# Patient Record
Sex: MALE | Race: WHITE | NOT HISPANIC OR LATINO | Employment: OTHER | URBAN - METROPOLITAN AREA
[De-identification: names, ages, dates, MRNs, and addresses within clinical notes are randomized per-mention and may not be internally consistent; named-entity substitution may affect disease eponyms.]

---

## 2020-12-16 ENCOUNTER — TELEMEDICINE (OUTPATIENT)
Dept: FAMILY MEDICINE CLINIC | Facility: CLINIC | Age: 75
End: 2020-12-16

## 2020-12-16 DIAGNOSIS — I25.708 CORONARY ARTERY DISEASE OF BYPASS GRAFT OF NATIVE HEART WITH STABLE ANGINA PECTORIS (HCC): ICD-10-CM

## 2020-12-16 DIAGNOSIS — E11.9 TYPE 2 DIABETES MELLITUS WITHOUT COMPLICATION, WITHOUT LONG-TERM CURRENT USE OF INSULIN (HCC): Primary | ICD-10-CM

## 2020-12-16 DIAGNOSIS — E78.2 MIXED HYPERLIPIDEMIA: ICD-10-CM

## 2020-12-16 DIAGNOSIS — Z23 NEED FOR VACCINATION: ICD-10-CM

## 2020-12-16 DIAGNOSIS — I10 ESSENTIAL HYPERTENSION: ICD-10-CM

## 2020-12-16 PROBLEM — Z95.1 S/P CABG X 3: Status: ACTIVE | Noted: 2020-12-16

## 2020-12-16 PROCEDURE — 99204 OFFICE O/P NEW MOD 45 MIN: CPT | Performed by: FAMILY MEDICINE

## 2020-12-16 RX ORDER — TRANDOLAPRIL TABLETS 4 MG/1
4 TABLET ORAL 2 TIMES DAILY
Qty: 180 TABLET | Refills: 1 | Status: SHIPPED | OUTPATIENT
Start: 2020-12-16 | End: 2021-06-07

## 2020-12-16 RX ORDER — INFLUENZA A VIRUS A/MICHIGAN/45/2015 X-275 (H1N1) ANTIGEN (FORMALDEHYDE INACTIVATED), INFLUENZA A VIRUS A/SINGAPORE/INFIMH-16-0019/2016 IVR-186 (H3N2) ANTIGEN (FORMALDEHYDE INACTIVATED), INFLUENZA B VIRUS B/PHUKET/3073/2013 ANTIGEN (FORMALDEHYDE INACTIVATED), AND INFLUENZA B VIRUS B/MARYLAND/15/2016 BX-69A ANTIGEN (FORMALDEHYDE INACTIVATED) 60; 60; 60; 60 UG/.7ML; UG/.7ML; UG/.7ML; UG/.7ML
1 INJECTION, SUSPENSION INTRAMUSCULAR ONCE
COMMUNITY
Start: 2020-12-01 | End: 2022-02-28

## 2020-12-16 RX ORDER — ROSUVASTATIN CALCIUM 20 MG/1
20 TABLET, COATED ORAL DAILY
Qty: 90 TABLET | Refills: 1 | Status: SHIPPED | OUTPATIENT
Start: 2020-12-16 | End: 2021-06-07

## 2020-12-16 RX ORDER — ZOSTER VACCINE RECOMBINANT, ADJUVANTED 50 MCG/0.5
0.5 KIT INTRAMUSCULAR ONCE
Qty: 0.5 ML | Refills: 0 | Status: SHIPPED | OUTPATIENT
Start: 2020-12-16 | End: 2020-12-16

## 2020-12-16 RX ORDER — METFORMIN HYDROCHLORIDE 500 MG/1
500 TABLET, EXTENDED RELEASE ORAL 2 TIMES DAILY WITH MEALS
Qty: 180 TABLET | Refills: 1 | Status: SHIPPED | OUTPATIENT
Start: 2020-12-16 | End: 2021-06-07

## 2020-12-16 NOTE — PROGRESS NOTES
VIDEO VISIT  Kerrie Tyler is 75 y.o. and is seen via video today for:     Chief Complaint   Patient presents with   • Diabetes   • Hypertension       HPI     Wanting to establish care today. Recently moved to [place] from [place] to live with his youngest son. He and his wife are from [place] originally but have been living in around the [place] for some time now.    Has established diagnoses of well-controlled type 2 diabetes, mixed hyperlipidemia, essential hypertension, and coronary artery disease status post a three-vessel CABG years ago. Overall is doing well. Reports good adherence and tolerance of his current medication regimen. Needs refills today of Metformin, metoprolol, rosuvastatin, and trandolapril.    Would also like to get a zoster vaccine in the near future. Wondering how best to go about doing this.    Is a retired ENT surgeon. Working hard to eat a balanced diet and exercise regularly. Enjoys taking care of his grandchild with his wife.    The following portions of the patient's history were reviewed and updated as appropriate: allergies, current medications, past family history, past medical history, past social history, past surgical history and problem list.    Review of Systems   Constitutional: Negative for activity change, chills, fatigue, fever and unexpected weight change.   HENT: Negative for sore throat.    Respiratory: Negative for cough, shortness of breath and wheezing.    Cardiovascular: Negative for chest pain, palpitations and leg swelling.   Gastrointestinal: Negative for abdominal distention, abdominal pain, constipation, diarrhea, nausea and vomiting.   Genitourinary: Negative for dysuria.   Musculoskeletal: Negative for arthralgias and myalgias.   Skin: Negative for rash.   Neurological: Negative for dizziness.   Psychiatric/Behavioral: Negative for dysphoric mood. The patient is not nervous/anxious.          Objective  There were no vitals filed for this visit.  There is no  height or weight on file to calculate BMI.    Physical Exam  Constitutional:       General: He is not in acute distress.     Appearance: Normal appearance. He is not ill-appearing.   HENT:      Head: Normocephalic.      Nose: Nose normal.   Eyes:      Extraocular Movements: Extraocular movements intact.      Conjunctiva/sclera: Conjunctivae normal.   Neck:      Musculoskeletal: Normal range of motion and neck supple.   Pulmonary:      Effort: Pulmonary effort is normal. No respiratory distress.   Skin:     Coloration: Skin is not jaundiced or pale.   Neurological:      Mental Status: He is alert and oriented to person, place, and time.   Psychiatric:         Mood and Affect: Mood normal.         Behavior: Behavior normal.           Current Outpatient Medications:   •  Fluzone High-Dose Quadrivalent 0.7 ML suspension prefilled syringe, Inject 1 dose into the appropriate muscle as directed by prescriber 1 (One) Time., Disp: , Rfl:   •  metFORMIN ER (GLUCOPHAGE-XR) 500 MG 24 hr tablet, Take 1 tablet by mouth 2 (Two) Times a Day With Meals for 180 days., Disp: 180 tablet, Rfl: 1  •  metoprolol tartrate (LOPRESSOR) 25 MG tablet, Take 1 tablet by mouth 2 (Two) Times a Day., Disp: 180 tablet, Rfl: 1  •  rosuvastatin (Crestor) 20 MG tablet, Take 1 tablet by mouth Daily., Disp: 90 tablet, Rfl: 1  •  trandolapril (MAVIK) 4 MG tablet, Take 1 tablet by mouth 2 (Two) Times a Day., Disp: 180 tablet, Rfl: 1  •  Zoster Vac Recomb Adjuvanted (Shingrix) 50 MCG/0.5ML reconstituted suspension, Inject 0.5 mL into the appropriate muscle as directed by prescriber 1 (One) Time for 1 dose., Disp: 0.5 mL, Rfl: 0  Current outpatient and discharge medications have been reconciled for the patient.  Reviewed by: Mayito Sepulveda MD      Procedures    Lab Results (most recent)     None                  Diagnoses and all orders for this visit:    1. Type 2 diabetes mellitus without complication, without long-term current use of insulin  (CMS/[facility]) (Primary)  -     metFORMIN ER (GLUCOPHAGE-XR) 500 MG 24 hr tablet; Take 1 tablet by mouth 2 (Two) Times a Day With Meals for 180 days.  Dispense: 180 tablet; Refill: 1    2. Mixed hyperlipidemia  -     rosuvastatin (Crestor) 20 MG tablet; Take 1 tablet by mouth Daily.  Dispense: 90 tablet; Refill: 1    3. Essential hypertension  -     metoprolol tartrate (LOPRESSOR) 25 MG tablet; Take 1 tablet by mouth 2 (Two) Times a Day.  Dispense: 180 tablet; Refill: 1  -     trandolapril (MAVIK) 4 MG tablet; Take 1 tablet by mouth 2 (Two) Times a Day.  Dispense: 180 tablet; Refill: 1    4. Coronary artery disease of bypass graft of native heart with stable angina pectoris (CMS/[facility])  -     metoprolol tartrate (LOPRESSOR) 25 MG tablet; Take 1 tablet by mouth 2 (Two) Times a Day.  Dispense: 180 tablet; Refill: 1    5. Need for vaccination  -     Zoster Vac Recomb Adjuvanted (Shingrix) 50 MCG/0.5ML reconstituted suspension; Inject 0.5 mL into the appropriate muscle as directed by prescriber 1 (One) Time for 1 dose.  Dispense: 0.5 mL; Refill: 0    Orders as above. Will send prescription for zoster vaccine to his pharmacy. Continue regimen as prescribed. Recommended follow-up as below for an in person visit. Encouraged communication via [employer] in the meantime.    Any information loaded into the AVS was placed there by NYA Sepulveda MD, and patient was counseled on the same.   Return in about 6 months (around 6/16/2021), or if symptoms worsen or fail to improve.      NYA Sepulveda MD

## 2021-01-25 ENCOUNTER — IMMUNIZATION (OUTPATIENT)
Dept: VACCINE CLINIC | Facility: HOSPITAL | Age: 76
End: 2021-01-25

## 2021-01-25 PROCEDURE — 0001A: CPT | Performed by: THORACIC SURGERY (CARDIOTHORACIC VASCULAR SURGERY)

## 2021-01-25 PROCEDURE — 91300 HC SARSCOV02 VAC 30MCG/0.3ML IM: CPT | Performed by: THORACIC SURGERY (CARDIOTHORACIC VASCULAR SURGERY)

## 2021-02-15 ENCOUNTER — IMMUNIZATION (OUTPATIENT)
Dept: VACCINE CLINIC | Facility: HOSPITAL | Age: 76
End: 2021-02-15

## 2021-02-15 PROCEDURE — 91300 HC SARSCOV02 VAC 30MCG/0.3ML IM: CPT | Performed by: THORACIC SURGERY (CARDIOTHORACIC VASCULAR SURGERY)

## 2021-02-15 PROCEDURE — 0002A: CPT | Performed by: THORACIC SURGERY (CARDIOTHORACIC VASCULAR SURGERY)

## 2021-06-05 DIAGNOSIS — E78.2 MIXED HYPERLIPIDEMIA: ICD-10-CM

## 2021-06-05 DIAGNOSIS — I10 ESSENTIAL HYPERTENSION: ICD-10-CM

## 2021-06-05 DIAGNOSIS — E11.9 TYPE 2 DIABETES MELLITUS WITHOUT COMPLICATION, WITHOUT LONG-TERM CURRENT USE OF INSULIN (HCC): ICD-10-CM

## 2021-06-05 DIAGNOSIS — I25.708 CORONARY ARTERY DISEASE OF BYPASS GRAFT OF NATIVE HEART WITH STABLE ANGINA PECTORIS (HCC): ICD-10-CM

## 2021-06-07 RX ORDER — TRANDOLAPRIL TABLETS 4 MG/1
TABLET ORAL
Qty: 180 TABLET | Refills: 1 | Status: SHIPPED | OUTPATIENT
Start: 2021-06-07 | End: 2021-11-30

## 2021-06-07 RX ORDER — METFORMIN HYDROCHLORIDE 500 MG/1
TABLET, EXTENDED RELEASE ORAL
Qty: 180 TABLET | Refills: 1 | Status: SHIPPED | OUTPATIENT
Start: 2021-06-07 | End: 2021-11-30

## 2021-06-07 RX ORDER — ROSUVASTATIN CALCIUM 20 MG/1
20 TABLET, COATED ORAL DAILY
Qty: 90 TABLET | Refills: 1 | Status: SHIPPED | OUTPATIENT
Start: 2021-06-07 | End: 2021-11-30

## 2021-09-14 ENCOUNTER — OFFICE VISIT (OUTPATIENT)
Dept: FAMILY MEDICINE CLINIC | Facility: CLINIC | Age: 76
End: 2021-09-14

## 2021-09-14 VITALS
DIASTOLIC BLOOD PRESSURE: 68 MMHG | OXYGEN SATURATION: 99 % | HEART RATE: 79 BPM | WEIGHT: 161 LBS | SYSTOLIC BLOOD PRESSURE: 130 MMHG | RESPIRATION RATE: 16 BRPM

## 2021-09-14 DIAGNOSIS — R39.11 BENIGN PROSTATIC HYPERPLASIA WITH URINARY HESITANCY: ICD-10-CM

## 2021-09-14 DIAGNOSIS — E11.9 TYPE 2 DIABETES MELLITUS WITHOUT COMPLICATION, WITHOUT LONG-TERM CURRENT USE OF INSULIN (HCC): ICD-10-CM

## 2021-09-14 DIAGNOSIS — E55.9 VITAMIN D DEFICIENCY: ICD-10-CM

## 2021-09-14 DIAGNOSIS — Z00.00 ROUTINE HEALTH MAINTENANCE: Primary | ICD-10-CM

## 2021-09-14 DIAGNOSIS — E11.319 DIABETIC RETINOPATHY OF LEFT EYE WITHOUT MACULAR EDEMA ASSOCIATED WITH TYPE 2 DIABETES MELLITUS, UNSPECIFIED RETINOPATHY SEVERITY (HCC): ICD-10-CM

## 2021-09-14 DIAGNOSIS — E78.2 MIXED HYPERLIPIDEMIA: ICD-10-CM

## 2021-09-14 DIAGNOSIS — L21.9 SEBORRHEIC DERMATITIS: ICD-10-CM

## 2021-09-14 DIAGNOSIS — H91.13 PRESBYCUSIS OF BOTH EARS: ICD-10-CM

## 2021-09-14 DIAGNOSIS — Z23 NEED FOR VACCINATION: ICD-10-CM

## 2021-09-14 DIAGNOSIS — Z11.59 ENCOUNTER FOR HEPATITIS C SCREENING TEST FOR LOW RISK PATIENT: ICD-10-CM

## 2021-09-14 DIAGNOSIS — N40.1 BENIGN PROSTATIC HYPERPLASIA WITH URINARY HESITANCY: ICD-10-CM

## 2021-09-14 DIAGNOSIS — I10 ESSENTIAL HYPERTENSION: ICD-10-CM

## 2021-09-14 PROCEDURE — G0439 PPPS, SUBSEQ VISIT: HCPCS | Performed by: FAMILY MEDICINE

## 2021-09-14 PROCEDURE — 99214 OFFICE O/P EST MOD 30 MIN: CPT | Performed by: FAMILY MEDICINE

## 2021-09-14 RX ORDER — MULTIVIT-MIN/IRON/FOLIC ACID/K 18-600-40
2000 CAPSULE ORAL DAILY
Qty: 90 CAPSULE | Refills: 3 | Status: SHIPPED | OUTPATIENT
Start: 2021-09-14

## 2021-09-14 RX ORDER — LANCETS
1 EACH MISCELLANEOUS DAILY
Qty: 90 EACH | Refills: 3 | Status: SHIPPED | OUTPATIENT
Start: 2021-09-14 | End: 2022-09-09

## 2021-09-14 RX ORDER — BLOOD-GLUCOSE METER
1 KIT MISCELLANEOUS DAILY
Qty: 1 EACH | Refills: 1 | Status: SHIPPED | OUTPATIENT
Start: 2021-09-14 | End: 2022-09-21 | Stop reason: SDUPTHER

## 2021-09-14 RX ORDER — TAMSULOSIN HYDROCHLORIDE 0.4 MG/1
1 CAPSULE ORAL DAILY
Qty: 90 CAPSULE | Refills: 1 | Status: SHIPPED | OUTPATIENT
Start: 2021-09-14 | End: 2022-02-28 | Stop reason: SDUPTHER

## 2021-09-14 RX ORDER — CHLORAL HYDRATE 500 MG
1000 CAPSULE ORAL
Qty: 90 CAPSULE | Refills: 3 | Status: SHIPPED | OUTPATIENT
Start: 2021-09-14

## 2021-09-14 NOTE — PROGRESS NOTES
Chief Complaint  Annual Exam    Subjective    History of Present Illness {CC  Problem List  Visit  Diagnosis   Encounters  Notes  Medications  Labs  Result Review Imaging  Media :23}     Kerrie Tyler presents to [facility] for Annual Exam.  History of Present Illness         Objective     Vital Signs:   /68   Pulse 79   Resp 16   Wt 73 kg (161 lb)   SpO2 99%   Physical Exam     Result Review  Data Reviewed:{ Labs  Result Review  Imaging  Med Tab  Media :23}   {The following data was reviewed by (Optional):50119}  {AMBULATORY LABS (Optional):84328}  {Data reviewed (Optional):00783}            Assessment and Plan {CC Problem List  Visit Diagnosis  ROS  Review (Popup)  Health Maintenance  Quality  BestPractice  Medications  SmartSets  SnapShot Encounters  Media :23}   There are no diagnoses linked to this encounter.    {Time Spent (Optional):62580}  Follow Up {Instructions Charge Capture  Follow-up Communications :23}     Patient was given instructions and counseling regarding his condition or for health maintenance advice. Please see specific information pulled into the AVS (placed there by myself) if appropriate.    No follow-ups on file.      NYA Sepulveda MD

## 2021-09-14 NOTE — PROGRESS NOTES
The ABCs of the Annual Wellness Visit  Subsequent Medicare Wellness Visit    Chief Complaint   Patient presents with   • Annual Exam      Subjective    History of Present Illness:  Kerrie Tyler is a 76 y.o. male who presents for a Subsequent Medicare Wellness Visit.    Here today for annual wellness visit as well as follow-up on a number of issues. Has not been seen for about a year. Doing well overall though requesting some refills and guidance.    Has chronic diabetes for which she is on Metformin. Is due for an annual eye check. Would like a referral to an ophthalmologist. Also needs new prescriptions for glucometer and supplies. Due for check of labs today as well.    Has worsening symptoms of BPH. Has urinary frequency, hesitancy, and weak stream. Tried alfuzosin in the past but did not tolerate it. Has never tried tamsulosin. Not interested in seeing urology at this time.    Hoping for refills of vitamin D supplementation and fish oil. Reports good adherence and tolerance overall.    Lastly is requesting a referral to ENT for evaluation of slow and progressive hearing loss.    The following portions of the patient's history were reviewed and   updated as appropriate: allergies, current medications, past family history, past medical history, past social history, past surgical history and problem list.    Compared to one year ago, the patient feels his physical   health is the same.    Compared to one year ago, the patient feels his mental   health is the same.    Recent Hospitalizations:  He was not admitted to the hospital during the last year.       Current Medical Providers:  Patient Care Team:  Mayito Sepulveda MD as PCP - General (Family Medicine)    Outpatient Medications Prior to Visit   Medication Sig Dispense Refill   • Fluzone High-Dose Quadrivalent 0.7 ML suspension prefilled syringe Inject 1 dose into the appropriate muscle as directed by prescriber 1 (One) Time.     • metFORMIN ER  (GLUCOPHAGE-XR) 500 MG 24 hr tablet TAKE 1 TABLET BY MOUTH TWICE DAILY WITH MEALS 180 tablet 1   • metoprolol tartrate (LOPRESSOR) 25 MG tablet TAKE 1 TABLET BY MOUTH TWICE DAILY 180 tablet 1   • rosuvastatin (CRESTOR) 20 MG tablet TAKE 1 TABLET BY MOUTH DAILY 90 tablet 1   • trandolapril (MAVIK) 4 MG tablet TAKE 1 TABLET BY MOUTH TWICE DAILY 180 tablet 1     No facility-administered medications prior to visit.       No opioid medication identified on active medication list. I have reviewed chart for other potential  high risk medication/s and harmful drug interactions in the elderly.          Aspirin is not on active medication list.  Aspirin use is not indicated based on review of current medical condition/s. Risk of harm outweighs potential benefits.  .    Patient Active Problem List   Diagnosis   • Type 2 diabetes mellitus without complication, without long-term current use of insulin (CMS/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)   • S/P CABG x 3   • Benign prostatic hyperplasia with urinary hesitancy   • Vitamin D deficiency   • Presbycusis of both ears   • Diabetic retinopathy associated with type 2 diabetes mellitus (CMS/[facility])     Advance Care Planning  Advance Directive is not on file.  ACP discussion was held with the patient during this visit. Patient does not have an advance directive, information provided.    Review of Systems   Constitutional: Negative for activity change, chills, fatigue and fever.   HENT: Positive for hearing loss. Negative for rhinorrhea and sore throat.    Eyes: Negative for pain and visual disturbance.   Respiratory: Negative for chest tightness, shortness of breath and wheezing.    Cardiovascular: Negative for chest pain, palpitations and leg swelling.   Gastrointestinal: Negative for abdominal pain, constipation, diarrhea, nausea and vomiting.   Genitourinary: Negative  for difficulty urinating.   Musculoskeletal: Negative for arthralgias and myalgias.   Skin: Negative for rash.   Neurological: Negative for seizures, weakness and numbness.   Psychiatric/Behavioral: Negative for behavioral problems, dysphoric mood and sleep disturbance. The patient is not nervous/anxious.         Objective    Vitals:    21 1130   BP: 130/68   Pulse: 79   Resp: 16   SpO2: 99%   Weight: 73 kg (161 lb)     BMI Readings from Last 1 Encounters:   No data found for BMI   BMI is below normal parameters. Recommendations include: none  There is no height or weight on file to calculate BMI.  BMI has not been calculated during today's encounter.     Does the patient have evidence of cognitive impairment? No    Physical Exam  Vitals and nursing note reviewed.   Constitutional:       General: He is not in acute distress.     Appearance: Normal appearance. He is not ill-appearing.   Cardiovascular:      Rate and Rhythm: Normal rate and regular rhythm.      Pulses: Normal pulses.      Heart sounds: Normal heart sounds. No murmur heard.     Pulmonary:      Effort: Pulmonary effort is normal. No respiratory distress.      Breath sounds: Normal breath sounds. No rales.   Neurological:      Mental Status: He is alert and oriented to person, place, and time. Mental status is at baseline.   Psychiatric:         Mood and Affect: Mood normal.         Behavior: Behavior normal.                 HEALTH RISK ASSESSMENT    Smoking Status:  Social History     Tobacco Use   Smoking Status Former Smoker   • Packs/day: 0.50   • Years: 12.00   • Pack years: 6.00   • Quit date:    • Years since quittin.7   Smokeless Tobacco Never Used     Alcohol Consumption:  Social History     Substance and Sexual Activity   Alcohol Use Never     Fall Risk Screen:    EVINADI Fall Risk Assessment has not been completed.    Depression Screening:  No flowsheet data found.    Health Habits and Functional and Cognitive Screening:  No  flowsheet data found.    Age-appropriate Screening Schedule:  Refer to the list below for future screening recommendations based on patient's age, sex and/or medical conditions. Orders for these recommended tests are listed in the plan section. The patient has been provided with a written plan.    Health Maintenance   Topic Date Due   • TDAP/TD VACCINES (1 - Tdap) Never done   • LIPID PANEL  Never done   • HEMOGLOBIN A1C  Never done   • DIABETIC EYE EXAM  Never done   • INFLUENZA VACCINE  10/01/2021   • ZOSTER VACCINE  Completed   • URINE MICROALBUMIN  Discontinued              Assessment/Plan   CMS Preventative Services Quick Reference  Risk Factors Identified During Encounter  advanced directive planning  The above risks/problems have been discussed with the patient.  Follow up actions/plans if indicated are seen below in the Assessment/Plan Section.  Pertinent information has been shared with the patient in the After Visit Summary.    Diagnoses and all orders for this visit:    1. Routine health maintenance (Primary)    2. Type 2 diabetes mellitus without complication, without long-term current use of insulin (CMS/[facility])  -     glucose monitor monitoring kit; 1 each Daily.  Dispense: 1 each; Refill: 1  -     Blood Gluc Meter Disp-Strips device; 1 Units Daily for 360 days.  Dispense: 90 each; Refill: 3  -     Lancets Thin misc; 1 Units Daily for 360 days.  Dispense: 90 each; Refill: 3  -     Comprehensive Metabolic Panel  -     Hemoglobin A1c    3. Diabetic retinopathy of left eye without macular edema associated with type 2 diabetes mellitus, unspecified retinopathy severity (CMS/[facility])  -     Hemoglobin A1c  -     Ambulatory Referral to Ophthalmology    4. Presbycusis of both ears  -     Ambulatory Referral to ENT (Otolaryngology)    5. Benign prostatic hyperplasia with urinary hesitancy  -     tamsulosin (FLOMAX) 0.4 MG capsule 24 hr capsule; Take 1 capsule by mouth Daily for 180 days.  Dispense: 90 capsule;  Refill: 1    6. Essential hypertension  -     Comprehensive Metabolic Panel    7. Mixed hyperlipidemia  -     Omega-3 Fatty Acids (fish oil) 1000 MG capsule capsule; Take 1 capsule by mouth Daily With Breakfast.  Dispense: 90 capsule; Refill: 3  -     Comprehensive Metabolic Panel  -     Lipid Panel    8. Seborrheic dermatitis    9. Vitamin D deficiency  -     Cholecalciferol (Vitamin D) 50 MCG (2000 UT) capsule; Take 1 capsule by mouth Daily.  Dispense: 90 capsule; Refill: 3    10. Encounter for hepatitis C screening test for low risk patient  -     Hepatitis C Antibody    11. Need for vaccination    Orders as above. Referrals as requested. Continue regimen as prescribed. I will contact him with lab results as available.    Recommended follow-up as below. Encouraged communication via [email or web address] in the meantime.    Follow Up:   Return in about 6 months (around 3/14/2022), or if symptoms worsen or fail to improve.     An After Visit Summary and PPPS were made available to the patient.               Prevention counseling performed as below: advanced directive planning.

## 2021-09-14 NOTE — PATIENT INSTRUCTIONS
Selenium sulfide shampoo      Advance Directive    Advance directives are legal documents that let you make choices ahead of time about your health care and medical treatment in case you become unable to communicate for yourself. Advance directives are a way for you to make known your wishes to family, friends, and health care providers. This can let others know about your end-of-life care if you become unable to communicate.  Discussing and writing advance directives should happen over time rather than all at once. Advance directives can be changed depending on your situation and what you want, even after you have signed the advance directives.  There are different types of advance directives, such as:  · Medical power of .  · Living will.  · Do not resuscitate (DNR) or do not attempt resuscitation (DNAR) order.  Health care proxy and medical power of   A health care proxy is also called a health care agent. This is a person who is appointed to make medical decisions for you in cases where you are unable to make the decisions yourself. Generally, people choose someone they know well and trust to represent their preferences. Make sure to ask this person for an agreement to act as your proxy. A proxy may have to exercise judgment in the event of a medical decision for which your wishes are not known.  A medical power of  is a legal document that names your health care proxy. Depending on the laws in your state, after the document is written, it may also need to be:  · Signed.  · Notarized.  · Dated.  · Copied.  · Witnessed.  · Incorporated into your medical record.  You may also want to appoint someone to manage your money in a situation in which you are unable to do so. This is called a durable power of  for finances. It is a separate legal document from the durable power of  for health care. You may choose the same person or someone different from your health care proxy to  act as your agent in money matters.  If you do not appoint a proxy, or if there is a concern that the proxy is not acting in your best interests, a court may appoint a guardian to act on your behalf.  Living will  A living will is a set of instructions that state your wishes about medical care when you cannot express them yourself. Health care providers should keep a copy of your living will in your medical record. You may want to give a copy to family members or friends. To alert caregivers in case of an emergency, you can place a card in your wallet to let them know that you have a living will and where they can find it. A living will is used if you become:  · Terminally ill.  · Disabled.  · Unable to communicate or make decisions.  Items to consider in your living will include:  · To use or not to use life-support equipment, such as dialysis machines and breathing machines (ventilators).  · A DNR or DNAR order. This tells health care providers not to use cardiopulmonary resuscitation (CPR) if breathing or heartbeat stops.  · To use or not to use tube feeding.  · To be given or not to be given food and fluids.  · Comfort (palliative) care when the goal becomes comfort rather than a cure.  · Donation of organs and tissues.  A living will does not give instructions for distributing your money and property if you should pass away.  DNR or DNAR  A DNR or DNAR order is a request not to have CPR in the event that your heart stops beating or you stop breathing. If a DNR or DNAR order has not been made and shared, a health care provider will try to help any patient whose heart has stopped or who has stopped breathing. If you plan to have surgery, talk with your health care provider about how your DNR or DNAR order will be followed if problems occur.  What if I do not have an advance directive?  If you do not have an advance directive, some states assign family decision makers to act on your behalf based on how closely you  are related to them. Each state has its own laws about advance directives. You may want to check with your health care provider, , or state representative about the laws in your state.  Summary  · Advance directives are the legal documents that allow you to make choices ahead of time about your health care and medical treatment in case you become unable to tell others about your care.  · The process of discussing and writing advance directives should happen over time. You can change the advance directives, even after you have signed them.  · Advance directives include DNR or DNAR orders, living taylor, and designating an agent as your medical power of .  This information is not intended to replace advice given to you by your health care provider. Make sure you discuss any questions you have with your health care provider.  Document Revised: 01/28/2021 Document Reviewed: 07/16/2020  [employer] Patient Education © 2021 [employer]

## 2021-09-15 LAB
ALBUMIN SERPL-MCNC: 4.7 G/DL (ref 3.7–4.7)
ALBUMIN/GLOB SERPL: 1.7 {RATIO} (ref 1.2–2.2)
ALP SERPL-CCNC: 67 IU/L (ref 44–121)
ALT SERPL-CCNC: 18 IU/L (ref 0–44)
AST SERPL-CCNC: 20 IU/L (ref 0–40)
BILIRUB SERPL-MCNC: 0.3 MG/DL (ref 0–1.2)
BUN SERPL-MCNC: 15 MG/DL (ref 8–27)
BUN/CREAT SERPL: 17 (ref 10–24)
CALCIUM SERPL-MCNC: 10.2 MG/DL (ref 8.6–10.2)
CHLORIDE SERPL-SCNC: 102 MMOL/L (ref 96–106)
CHOLEST SERPL-MCNC: 161 MG/DL (ref 100–199)
CO2 SERPL-SCNC: 22 MMOL/L (ref 20–29)
CREAT SERPL-MCNC: 0.88 MG/DL (ref 0.76–1.27)
GLOBULIN SER CALC-MCNC: 2.8 G/DL (ref 1.5–4.5)
GLUCOSE SERPL-MCNC: 104 MG/DL (ref 65–99)
HBA1C MFR BLD: 6.2 % (ref 4.8–5.6)
HCV AB S/CO SERPL IA: <0.1 S/CO RATIO (ref 0–0.9)
HDLC SERPL-MCNC: 49 MG/DL
LDLC SERPL CALC-MCNC: 95 MG/DL (ref 0–99)
POTASSIUM SERPL-SCNC: 4.6 MMOL/L (ref 3.5–5.2)
PROT SERPL-MCNC: 7.5 G/DL (ref 6–8.5)
SODIUM SERPL-SCNC: 140 MMOL/L (ref 134–144)
TRIGL SERPL-MCNC: 92 MG/DL (ref 0–149)
VLDLC SERPL CALC-MCNC: 17 MG/DL (ref 5–40)

## 2021-09-29 ENCOUNTER — IMMUNIZATION (OUTPATIENT)
Dept: VACCINE CLINIC | Facility: HOSPITAL | Age: 76
End: 2021-09-29

## 2021-09-29 PROCEDURE — 0003A: CPT | Performed by: INTERNAL MEDICINE

## 2021-09-29 PROCEDURE — 91300 HC SARSCOV02 VAC 30MCG/0.3ML IM: CPT | Performed by: INTERNAL MEDICINE

## 2021-11-30 DIAGNOSIS — E78.2 MIXED HYPERLIPIDEMIA: ICD-10-CM

## 2021-11-30 DIAGNOSIS — I10 ESSENTIAL HYPERTENSION: ICD-10-CM

## 2021-11-30 DIAGNOSIS — E11.9 TYPE 2 DIABETES MELLITUS WITHOUT COMPLICATION, WITHOUT LONG-TERM CURRENT USE OF INSULIN (HCC): ICD-10-CM

## 2021-11-30 DIAGNOSIS — I25.708 CORONARY ARTERY DISEASE OF BYPASS GRAFT OF NATIVE HEART WITH STABLE ANGINA PECTORIS (HCC): ICD-10-CM

## 2021-11-30 RX ORDER — METFORMIN HYDROCHLORIDE 500 MG/1
TABLET, EXTENDED RELEASE ORAL
Qty: 180 TABLET | Refills: 1 | Status: SHIPPED | OUTPATIENT
Start: 2021-11-30 | End: 2022-03-07

## 2021-11-30 RX ORDER — ROSUVASTATIN CALCIUM 20 MG/1
20 TABLET, COATED ORAL DAILY
Qty: 90 TABLET | Refills: 1 | Status: SHIPPED | OUTPATIENT
Start: 2021-11-30 | End: 2022-03-07

## 2021-11-30 RX ORDER — TRANDOLAPRIL TABLETS 4 MG/1
TABLET ORAL
Qty: 180 TABLET | Refills: 1 | Status: SHIPPED | OUTPATIENT
Start: 2021-11-30 | End: 2022-03-07

## 2022-02-28 ENCOUNTER — OFFICE VISIT (OUTPATIENT)
Dept: FAMILY MEDICINE CLINIC | Facility: CLINIC | Age: 77
End: 2022-02-28

## 2022-02-28 VITALS
OXYGEN SATURATION: 99 % | SYSTOLIC BLOOD PRESSURE: 140 MMHG | WEIGHT: 165 LBS | DIASTOLIC BLOOD PRESSURE: 80 MMHG | HEART RATE: 81 BPM | RESPIRATION RATE: 18 BRPM

## 2022-02-28 DIAGNOSIS — G89.29 CHRONIC MID BACK PAIN: Primary | ICD-10-CM

## 2022-02-28 DIAGNOSIS — R39.11 BENIGN PROSTATIC HYPERPLASIA WITH URINARY HESITANCY: ICD-10-CM

## 2022-02-28 DIAGNOSIS — E11.9 TYPE 2 DIABETES MELLITUS WITHOUT COMPLICATION, WITHOUT LONG-TERM CURRENT USE OF INSULIN: ICD-10-CM

## 2022-02-28 DIAGNOSIS — M79.674 TOE PAIN, RIGHT: ICD-10-CM

## 2022-02-28 DIAGNOSIS — M54.9 CHRONIC MID BACK PAIN: Primary | ICD-10-CM

## 2022-02-28 DIAGNOSIS — N40.1 BENIGN PROSTATIC HYPERPLASIA WITH URINARY HESITANCY: ICD-10-CM

## 2022-02-28 PROCEDURE — 99214 OFFICE O/P EST MOD 30 MIN: CPT | Performed by: FAMILY MEDICINE

## 2022-02-28 RX ORDER — TAMSULOSIN HYDROCHLORIDE 0.4 MG/1
1 CAPSULE ORAL DAILY
Qty: 90 CAPSULE | Refills: 3 | Status: SHIPPED | OUTPATIENT
Start: 2022-02-28 | End: 2023-02-16

## 2022-02-28 NOTE — PROGRESS NOTES
Chief Complaint  Pain (right foot)    Subjective    History of Present Illness {CC  Problem List  Visit  Diagnosis   Encounters  Notes  Medications  Labs  Result Review Imaging  Media :23}     Kerrie Tyler presents to [facility] for Pain (right foot).  History of Present Illness     Here with complaint of about two weeks of pain and swelling in the distal toes of his R foot. Had what appeared to be erythematous vesicles at the ends of his first through third toes on the right foot. Has since resolved completely but he has pictures on his phone to show me. Were quite tender. Has noticed some recent coldness in his feet as well, typically worse at night. Cannot recall any specific exposure to extreme cold though he does wonder if it was potentially frostbite.    Has continued chronic mid back pain. Has a history of some disc degeneration in the region. Has been interrupting his sleep somewhat. Is hoping for a referral to physical therapy for further evaluation and management.    Has longstanding diabetes without use of insulin. Reports good adherence and tolerance to Metformin. Would like to get an A1c checked today.    Needs a refill of tamsulosin. Provides symptomatic relief for his BPH.    Objective     Vital Signs:   /80   Pulse 81   Resp 18   Wt 74.8 kg (165 lb)   SpO2 99%   Physical Exam  Vitals and nursing note reviewed.   Constitutional:       General: He is not in acute distress.     Appearance: Normal appearance. He is not ill-appearing.   Cardiovascular:      Rate and Rhythm: Normal rate and regular rhythm.      Pulses: Normal pulses.      Heart sounds: Normal heart sounds. No murmur heard.      Pulmonary:      Effort: Pulmonary effort is normal. No respiratory distress.      Breath sounds: Normal breath sounds. No rales.   Musculoskeletal:      Thoracic back: Spasms and tenderness present. No bony tenderness. Normal range of motion.      Right foot:  Normal. Normal range of motion and normal capillary refill. No tenderness. Normal pulse.      Left foot: Normal.   Neurological:      Mental Status: He is alert and oriented to person, place, and time. Mental status is at baseline.   Psychiatric:         Mood and Affect: Mood normal.         Behavior: Behavior normal.          Result Review  Data Reviewed:{ Labs  Result Review  Imaging  Med Tab  Media :23}                   Assessment and Plan {CC Problem List  Visit Diagnosis  ROS  Review (Popup)  Health Maintenance  Quality  BestPractice  Medications  SmartSets  SnapShot Encounters  Media :23}   Diagnoses and all orders for this visit:    1. Chronic mid back pain (Primary)  -     Ambulatory Referral to Physical Therapy Evaluate and treat    2. Type 2 diabetes mellitus without complication, without long-term current use of insulin (HCC)  -     Hemoglobin A1c    3. Benign prostatic hyperplasia with urinary hesitancy  -     tamsulosin (FLOMAX) 0.4 MG capsule 24 hr capsule; Take 1 capsule by mouth Daily for 180 days.  Dispense: 90 capsule; Refill: 3    4. Toe pain, right    Orders as above. I will contact him with lab results as available.    I do wonder if his toe pain was related to chilblains. Story and picture certainly are consistent. Advised him against any further cold exposure. Recommended socks in bed. Will let me know if his symptoms return.    Recommended follow-up as below. Encouraged communication via Screenzhart the meantime.      Follow Up {Instructions Charge Capture  Follow-up Communications :23}     Patient was given instructions and counseling regarding his condition or for health maintenance advice. Please see specific information pulled into the AVS (placed there by myself) if appropriate.    Return in about 2 months (around 4/28/2022), or if symptoms worsen or fail to improve.      NYA Sepulveda MD

## 2022-03-01 LAB — HBA1C MFR BLD: 6 % (ref 4.8–5.6)

## 2022-03-04 ENCOUNTER — TELEPHONE (OUTPATIENT)
Dept: FAMILY MEDICINE CLINIC | Facility: CLINIC | Age: 77
End: 2022-03-04

## 2022-03-04 NOTE — TELEPHONE ENCOUNTER
THE PATIENT'S SON STATES THAT THE PATIENT WILL NEED TO BE REFERRED TO [facility]. THIS IS BECAUSE THEY ACCEPT MEDICAID. THE FAX NUMBER [phone].

## 2022-03-05 DIAGNOSIS — I10 ESSENTIAL HYPERTENSION: ICD-10-CM

## 2022-03-05 DIAGNOSIS — I25.708 CORONARY ARTERY DISEASE OF BYPASS GRAFT OF NATIVE HEART WITH STABLE ANGINA PECTORIS: ICD-10-CM

## 2022-03-05 DIAGNOSIS — E11.9 TYPE 2 DIABETES MELLITUS WITHOUT COMPLICATION, WITHOUT LONG-TERM CURRENT USE OF INSULIN: ICD-10-CM

## 2022-03-05 DIAGNOSIS — E78.2 MIXED HYPERLIPIDEMIA: ICD-10-CM

## 2022-03-07 RX ORDER — TRANDOLAPRIL TABLETS 4 MG/1
TABLET ORAL
Qty: 180 TABLET | Refills: 1 | Status: SHIPPED | OUTPATIENT
Start: 2022-03-07

## 2022-03-07 RX ORDER — METFORMIN HYDROCHLORIDE 500 MG/1
TABLET, EXTENDED RELEASE ORAL
Qty: 180 TABLET | Refills: 1 | Status: SHIPPED | OUTPATIENT
Start: 2022-03-07 | End: 2023-03-23 | Stop reason: SDUPTHER

## 2022-03-07 RX ORDER — ROSUVASTATIN CALCIUM 20 MG/1
20 TABLET, COATED ORAL DAILY
Qty: 90 TABLET | Refills: 1 | Status: SHIPPED | OUTPATIENT
Start: 2022-03-07 | End: 2022-08-25 | Stop reason: HOSPADM

## 2022-03-21 ENCOUNTER — HOSPITAL ENCOUNTER (OUTPATIENT)
Dept: PHYSICAL THERAPY | Facility: HOSPITAL | Age: 77
Setting detail: THERAPIES SERIES
Discharge: HOME | End: 2022-03-21

## 2022-03-21 DIAGNOSIS — M54.50 LOW BACK PAIN, UNSPECIFIED BACK PAIN LATERALITY, UNSPECIFIED CHRONICITY, UNSPECIFIED WHETHER SCIATICA PRESENT: Primary | ICD-10-CM

## 2022-03-21 DIAGNOSIS — M54.9 MID BACK PAIN: ICD-10-CM

## 2022-03-21 DIAGNOSIS — Z74.09 IMPAIRED MOBILITY: ICD-10-CM

## 2022-03-21 PROCEDURE — 97110 THERAPEUTIC EXERCISES: CPT | Performed by: PHYSICAL THERAPIST

## 2022-03-21 PROCEDURE — 97161 PT EVAL LOW COMPLEX 20 MIN: CPT | Performed by: PHYSICAL THERAPIST

## 2022-03-21 NOTE — THERAPY EVALUATION
Outpatient Physical Therapy Ortho Initial Evaluation  [facility]     Patient Name: Kerrie Tyler  : 1945  MRN: [id number]  Today's Date: 3/21/2022      Visit Date: 2022    Patient Active Problem List   Diagnosis   • Type 2 diabetes mellitus without complication, without long-term current use of insulin (HCC)   • Mixed hyperlipidemia   • Essential hypertension   • Coronary artery disease of bypass graft of native heart with stable angina pectoris (HCC)   • S/P CABG x 3   • Benign prostatic hyperplasia with urinary hesitancy   • Vitamin D deficiency   • Presbycusis of both ears   • Diabetic retinopathy associated with type 2 diabetes mellitus (HCC)        Past Medical History:   Diagnosis Date   • Kidney stone    • Myocardial infarction (HCC)         Past Surgical History:   Procedure Laterality Date   • CATARACT EXTRACTION, BILATERAL Bilateral    • CORONARY ARTERY BYPASS GRAFT      3 vessel   • EXTRACORPOREAL SHOCK WAVE LITHOTRIPSY (ESWL) Bilateral    • TONSILLECTOMY         Visit Dx:     ICD-10-CM ICD-9-CM   1. Low back pain, unspecified back pain laterality, unspecified chronicity, unspecified whether sciatica present  M54.50 724.2   2. Mid back pain  M54.9 724.5   3. Impaired mobility  Z74.09 799.89          Patient History     Row Name 22 1000             History    Chief Complaint Difficulty Walking;Difficulty with daily activities;Pain  -GJ      Type of Pain Back pain  -GJ      Date Current Problem(s) Began --  3 years  -GJ      Brief Description of Current Complaint Mr. Tyler is a 6 y/ o R handed male. He presents to the clinic with 3+ year hx of lower thoracic/upper lumbar pain. Not improving, maybe worsening somewhat. Aggravating activities include standing > 10-15 min, walking >/= ½ mile, and sit to/from stand transition.  He denies sleep disturbance, denies N/T BLE, denies changes in bowel/bladder, denies treatments or imaging for this condition.  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  Denies traumas/falls/knowledge of BOLA.  -GJ      Previous treatment for THIS PROBLEM --  nothing currently  -GJ      Patient/Caregiver Goals Relieve pain;Improve mobility;Know what to do to help the symptoms  -GJ      Hand Dominance right-handed  -GJ      Occupation/sports/leisure activities retired ENT  -GJ      What clinical tests have you had for this problem? --  nothing to date  -GJ              Pain     Pain Location Back  -GJ      What Performance Factors Make the Current Problem(s) WORSE? standiing, walking, sit to stand transition  -GJ              Fall Risk Assessment    Any falls in the past year: No  -GJ              Daily Activities    Primary Language --  Slovenian  -GJ      Teaching needs identified Home Exercise Program  -GJ      Patient is concerned about/has problems with Performing home management (household chores, shopping, care of dependents);Performing job responsibilities/community activities (work, school,;Performing sports, recreation, and play activities;Walking;Transfers (getting out of a chair, bed)  -GJ      Barriers to learning Communication  -GJ      Pt Participated in POC and Goals Yes  -GJ            User Key  (r) = Recorded By, (t) = Taken By, (c) = Cosigned By    Initials Name Provider Type    GJ Hever Dong, PT Physical Therapist                 PT Ortho     Row Name 03/21/22 1000       Posture/Observations    Alignment Options Forward head;Thoracic kyphosis;Rounded shoulders;Scoliosis  -GJ    Forward Head Mild;Moderate  -GJ    Thoracic Kyphosis Moderate  -GJ    Rounded Shoulders Moderate  -GJ    Scoliosis Mild  -GJ       Quarter Clearing    Quarter Clearing Upper Quarter Clearing;Lower Quarter Clearing  -GJ       Myotomal Screen- Upper Quarter Clearing    Shoulder flexion (C5) Bilateral:;4+ (Good +)  -GJ    Elbow flexion/wrist extension (C6) Bilateral:;5 (Normal)  -GJ    Elbow  extension/wrist flexion (C7) Bilateral:;5 (Normal)  -GJ    Finger flexion/ (C8) Bilateral:;5 (Normal)  -GJ    Finger abduction (T1) Bilateral:;5 (Normal)  -GJ       Cervical/Shoulder ROM Screen    Cervical flexion Normal  -GJ    Cervical extension Normal  -GJ    Cervical rotation Normal  -GJ       DTR- Lower Quarter Clearing    Patellar tendon (L2-4) Bilateral:;2- Normal response  -GJ    Achilles tendon (S1-2) Bilateral:;2- Normal response  -GJ       Neural Tension Signs- Lower Quarter Clearing    Slump Bilateral:;Negative  -GJ    SLR Bilateral:;Negative  -GJ    Prone knee flexion Bilateral:;Negative  -GJ       Pathological Reflexes- Lower Quarter Clearing    Clonus Bilateral:;Negative  -GJ       Myotomal Screen- Lower Quarter Clearing    Hip flexion (L2) Bilateral:;4 (Good)  -GJ    Knee extension (L3) Bilateral:;5 (Normal)  in available range on L  -GJ    Ankle DF (L4) Bilateral:;5 (Normal)  -GJ    Great toe extension (L5) Bilateral:;5 (Normal)  -GJ    Knee flexion (S2) Bilateral:;5 (Normal)  -GJ       Lumbar ROM Screen- Lower Quarter Clearing    Lumbar Flexion Normal  -GJ    Lumbar Extension Normal  -GJ    Lumbar Rotation Normal  -GJ       SI/Hip Screen- Lower Quarter Clearing    Roma's/Robert's test --  moderate limitations bilaterally (hip range)  -GJ    Posterior thigh sheer Bilateral:;Negative  -GJ       Special Tests/Palpation    Special Tests/Palpation Cervical/Thoracic;Lumbar/SI  -GJ       Cervical Palpation    Cervical Palpation- Location? Upper traps;Middle traps;Rhomboids;Lower traps  -GJ    Upper Traps Bilateral:;Guarded/taut  -GJ    Middle Traps Bilateral:;Guarded/taut  -GJ    Rhomboids Bilateral:;Guarded/taut  -GJ    Lower Traps Bilateral:;Guarded/taut  -GJ       Thoracic Accessory Motions    Thoracic Accessory Motions Tested? Yes  -GJ    Pa glide- Upper thoracic Hypomobile  -GJ    Pa glide- Middle thoracic Hypomobile  -GJ    Pa glide- Lower thoracic Hypomobile  -GJ       Lumbosacral Palpation     Lumbosacral Palpation? Yes  -GJ    Quadratus Lumborum Bilateral:;Guarded/taut  -GJ    Erector Spinae (Paraspinals) Bilateral:;Guarded/taut  -GJ       General ROM    GENERAL ROM COMMENTS mild limitations in L shoulder flexion/abd, mild limitations in L knee ext. ROM  -GJ       MMT (Manual Muscle Testing)    Rt Lower Ext Rt Hip ABduction  -GJ    Lt Lower Ext Lt Hip ABduction  -GJ       MMT Right Lower Ext    Rt Hip ABduction MMT, Gross Movement (4+/5) good plus  -GJ       MMT Left Lower Ext    Lt Hip ABduction MMT, Gross Movement (4+/5) good plus  -GJ       Flexibility    Flexibility Tested? Upper Extremity;Lower Extremity  -GJ       Upper Extremity Flexibility    Scalenes Bilateral:;Mildly limited;Moderately limited  -GJ    Upper Trapezius Bilateral:;Moderately limited  -GJ    Pect Minor Bilateral:;Mildly limited;Moderately limited  -GJ    Pect Major Bilateral:;Severely limited  -GJ    Latissimus Dorsi Bilateral:;Moderately limited  -GJ       Lower Extremity Flexibility    Hamstrings Bilateral:;Mildly limited;Moderately limited  -GJ    Hip Flexors Bilateral:;Mildly limited;Moderately limited  -GJ    Hip Internal Rotators Bilateral:;Moderately limited  -GJ    Quadratus Lumborum Bilateral:;Moderately limited  -GJ          User Key  (r) = Recorded By, (t) = Taken By, (c) = Cosigned By    Initials Name Provider Type    Hever Perez, PT Physical Therapist                            Therapy Education  Education Details: discussed dx, px, poc, discussed anatomy of the spine and physiology of healing, discussed realistic expectations and time frames for therapy  Given: HEP, Symptoms/condition management, Pain management, Posture/body mechanics, Mobility training  Program: New  How Provided: Verbal, Demonstration, Written  Provided to: Patient  Level of Understanding: Teach back education performed, Verbalized, Demonstrated      PT OP Goals     Row Name 03/21/22 1000          PT Short Term Goals    STG Date to  Achieve 04/22/22  -GJ     STG 1 pt. to be I with initial HEP to facilitate self management of their condition  -GJ     STG 1 Progress New  -GJ     STG 2 pt. to be educated in/verbalize understanding of the importance of posture/ergonomics in association with their condition to facilitate self management of their condition  -GJ     STG 2 Progress New  -GJ     STG 3 pt to demonstrate/report being able to stand >/=20 min without T spine pain to facillitae ease with performing household//community ativities  -GJ     STG 3 Progress New  -GJ            Long Term Goals    LTG Date to Achieve 06/17/22  -GJ     LTG 1 pt. to be I with advanced HEP to facilitate self management of their condition  -GJ     LTG 1 Progress New  -GJ     LTG 2 pt. to report an Oswestry </= 20%   to demonstrate decreased level of perceived disability  -GJ     LTG 2 Progress New  -GJ     LTG 3 pt to demonstrate/report being able to stand >/=40 min without T spine pain to facillitae ease with performing household//community ativities  -GJ     LTG 3 Progress New  -GJ     LTG 4 pt to report/demonstrate ability to walk >/= 2 miles without T/L spine pain to facilitate ease with community/fitness activitis  -GJ     LTG 4 Progress New  -GJ            Time Calculation    PT Goal Re-Cert Due Date 06/17/22  -           User Key  (r) = Recorded By, (t) = Taken By, (c) = Cosigned By    Initials Name Provider Type     Hever Dong, PT Physical Therapist                 PT Assessment/Plan     Row Name 03/21/22 1122          PT Assessment    Functional Limitations Limitation in home management;Limitations in community activities;Impaired gait;Performance in leisure activities;Performance in sport activities  -     Impairments Gait;Impaired flexibility;Impaired muscle endurance;Impaired muscle length;Impaired muscle power;Impaired postural alignment;Joint mobility;Muscle strength;Pain;Poor body mechanics;Posture;Range of motion  -     Assessment Comments  Mr. Tyler is a 6 y/ o R handed male. He presents to the clinic with 3+ year hx of lower thoracic/upper lumbar pain. Not improving, maybe worsening somewhat. Aggravating activities include standing > 10-15 min, walking >/= ½ mile, and sit to/from stand transition.  He denies sleep disturbance, denies N/T BLE, denies changes in bowel/bladder, denies treatments or imaging for this condition.  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  Denies traumas/falls/knowledge of BOLA. Mr. Tyler presents to the clinic, demonstrating FHP, rounded shoulder and increased T spine kyphosis posture. He demonstrates what appears to be mild scoliosis. He demonstrates hypomobile T spine tissues, decreased flexibility of hip girdle/lattisimus dorsi tissues.  He demonstrates increased tautness bilateral T spine tissue, and requires frequent cuing to avoid slouched sitting posture.  He reports an Oswestry score of 34% scored 0-100, 0% represents no perceived disability.  Mr. Tyler demonstrates evolving s/s consistent with degenerative changes of his spine/postural syndrome which limits his participation in household, community, leisure, fitness activities.    Aggravating/Personal factors affecting recovery include,  but are not limited to, chronicity of condition.  Mr. Tyler may benefit from skilled physical therapy to address the above impairments  -GJ     Please refer to paper survey for additional self-reported information Yes  -GJ     Rehab Potential Good  -GJ     Patient/caregiver participated in establishment of treatment plan and goals Yes  -GJ     Patient would benefit from skilled therapy intervention Yes  -GJ            PT Plan    PT Frequency 1x/week;2x/week  -GJ     Predicted Duration of Therapy Intervention (PT) 10 visits  -GJ     Planned CPT's? PT EVAL LOW COMPLEXITY: 41620;PT RE-EVAL: 76666;PT THER PROC EA 15 MIN: 76334;PT THER ACT EA 15 MIN: 06698;PT MANUAL  THERAPY EA 15 MIN: 90218;PT HOT OR COLD PACK TREAT MCARE;PT ELECTRICAL STIM UNATTEND: ;PT ULTRASOUND EA 15 MIN: 50034  -GJ     PT Plan Comments warm up on UBE reverese, scap retraction/shoulder ext with T band, chin tucks, lateral trunk stretch (at doorway), supine alt arms? over towel roll, ? seated T spine ext. PA mobs to T spine, ? STM to T spine paraspinal  -GJ           User Key  (r) = Recorded By, (t) = Taken By, (c) = Cosigned By    Initials Name Provider Type    Hever Perez, PT Physical Therapist                   OP Exercises     Row Name 03/21/22 1100 03/21/22 1000          Total Minutes    85970 - PT Therapeutic Exercise Minutes -- 15  -GJ            Exercise 1    Exercise Name 1 reverse shoulder rolls  -GJ --     Cueing 1 Verbal;Demo  -GJ --     Reps 1 15  -GJ --            Exercise 2    Exercise Name 2 scap retraction  -GJ --     Cueing 2 Verbal;Demo  -GJ --     Reps 2 15  -GJ --     Time 2 5s  -GJ --            Exercise 3    Exercise Name 3 LTR  -GJ --     Cueing 3 Verbal;Demo  -GJ --     Reps 3 10  -GJ --     Time 3 5s  -GJ --            Exercise 4    Exercise Name 4 piriformis stretch, B  -GJ --     Cueing 4 Verbal;Demo  -GJ --     Reps 4 3  -GJ --     Time 4 20 s  -GJ --            Exercise 5    Exercise Name 5 SL trunk rotation  -GJ --     Cueing 5 Verbal;Demo  -GJ --     Reps 5 5  -GJ --     Time 5 10s  -GJ --            Exercise 6    Exercise Name 6 doorway stretch  -GJ --     Cueing 6 Verbal;Demo  -GJ --     Reps 6 3  -GJ --     Time 6 20 s  -GJ --           User Key  (r) = Recorded By, (t) = Taken By, (c) = Cosigned By    Initials Name Provider Type    Hever Perez, PT Physical Therapist                              Outcome Measure Options: Modifed Owestry  Modified Oswestry  Modified Oswestry Score/Comments: 34%      Time Calculation:     Start Time: 1045  Stop Time: 1130  Time Calculation (min): 45 min  Timed Charges  86233 - PT Therapeutic Exercise Minutes: 15  Total  Minutes  Timed Charges Total Minutes: 15   Total Minutes: 15     Therapy Charges for Today     Code Description Service Date Service Provider Modifiers Qty    28339520129 HC PT THER PROC EA 15 MIN 3/21/2022 Hever Dong, PT GP 1    90270177275 HC PT EVAL LOW COMPLEXITY 2 3/21/2022 Hever Dong, PT GP 1          PT G-Codes  Outcome Measure Options: Modifed Owestry  Modified Oswestry Score/Comments: 34%         Hever Dong, PT  3/21/2022

## 2022-03-23 ENCOUNTER — HOSPITAL ENCOUNTER (OUTPATIENT)
Dept: PHYSICAL THERAPY | Facility: HOSPITAL | Age: 77
Setting detail: THERAPIES SERIES
Discharge: HOME | End: 2022-03-23

## 2022-03-23 DIAGNOSIS — M54.50 LOW BACK PAIN, UNSPECIFIED BACK PAIN LATERALITY, UNSPECIFIED CHRONICITY, UNSPECIFIED WHETHER SCIATICA PRESENT: Primary | ICD-10-CM

## 2022-03-23 DIAGNOSIS — Z74.09 IMPAIRED MOBILITY: ICD-10-CM

## 2022-03-23 DIAGNOSIS — M54.9 MID BACK PAIN: ICD-10-CM

## 2022-03-23 PROCEDURE — 97140 MANUAL THERAPY 1/> REGIONS: CPT | Performed by: PHYSICAL THERAPIST

## 2022-03-23 PROCEDURE — 97110 THERAPEUTIC EXERCISES: CPT | Performed by: PHYSICAL THERAPIST

## 2022-03-23 NOTE — THERAPY TREATMENT NOTE
Outpatient Physical Therapy Ortho Treatment Note  [facility]     Patient Name: Kerrie Tyler  : 1945  MRN: [id number]  Today's Date: 3/23/2022      Visit Date: 2022    Visit Dx:    ICD-10-CM ICD-9-CM   1. Low back pain, unspecified back pain laterality, unspecified chronicity, unspecified whether sciatica present  M54.50 724.2   2. Mid back pain  M54.9 724.5   3. Impaired mobility  Z74.09 799.89       Patient Active Problem List   Diagnosis   • Type 2 diabetes mellitus without complication, without long-term current use of insulin ([facility])   • Mixed hyperlipidemia   • Essential hypertension   • Coronary artery disease of bypass graft of native heart with stable angina pectoris ([facility])   • S/P CABG x 3   • Benign prostatic hyperplasia with urinary hesitancy   • Vitamin D deficiency   • Presbycusis of both ears   • Diabetic retinopathy associated with type 2 diabetes mellitus ([facility])        Past Medical History:   Diagnosis Date   • Kidney stone    • Myocardial infarction ([facility])         Past Surgical History:   Procedure Laterality Date   • CATARACT EXTRACTION, BILATERAL Bilateral    • CORONARY ARTERY BYPASS GRAFT      3 vessel   • EXTRACORPOREAL SHOCK WAVE LITHOTRIPSY (ESWL) Bilateral    • TONSILLECTOMY                          PT Assessment/Plan     Row Name 22 1259          PT Assessment    Assessment Comments Mr. Tyler returns for his first follow up PT session for his T spine pain.  He reports adherence to his HEP and some improvement his back pain.  We introduced several new strengtheing activities today, did not issue for home yet, but plan to next session.  Introduced STM to bilateral T spine paraspainl tissues and hip flexor tissues.  Pt signed consent forms for LYFT, secondary to possibly needing to use this service in the next several weeks.  He continues to demontrate moderate increase in T spine kyphosis and hypomobility throughout T spine. Mr. Tyler continues to be a good  candidate for skilled physical therapy  -GJ            PT Plan    PT Plan Comments asses respone to STM/streching of hip flexors, continue to work on scapular girdl strength/postural awareness. ? T spine ext over chair, ? alternating UE over foam roll, HA in supine, ER in supine, manual prn  -GJ           User Key  (r) = Recorded By, (t) = Taken By, (c) = Cosigned By    Initials Name Provider Type    Hever Perez, PT Physical Therapist                   OP Exercises     Row Name 03/23/22 0921 03/23/22 0900          Subjective Comments    Subjective Comments -- I'm feeling better, I've been working on the exercises  -GJ            Total Minutes    19729 - PT Therapeutic Exercise Minutes 14  -GJ --     60726 - PT Manual Therapy Minutes 28  -GJ --            Exercise 1    Exercise Name 1 -- reverse shoulder rolls  -GJ     Cueing 1 -- Verbal;Demo  -GJ     Reps 1 -- 15  -GJ     Time 1 -- 3#  -GJ            Exercise 2    Exercise Name 2 -- shoulder ext/scap retractoin  -GJ     Cueing 2 -- Verbal;Demo  -GJ     Reps 2 -- 20  -GJ     Time 2 -- RTB  -GJ            Exercise 6    Exercise Name 6 -- doorway stretch  -GJ     Cueing 6 -- Verbal;Demo  -GJ     Reps 6 -- 3  -GJ     Time 6 -- 20 s  -GJ            Exercise 7    Exercise Name 7 -- UBE, reverse  -GJ     Cueing 7 -- Verbal;Demo  -GJ     Reps 7 -- 4 min  -GJ            Exercise 8    Exercise Name 8 -- seated chin tuck  -GJ     Cueing 8 -- Verbal  -GJ     Reps 8 -- 10  -GJ     Time 8 -- 5s  -GJ           User Key  (r) = Recorded By, (t) = Taken By, (c) = Cosigned By    Initials Name Provider Type    Hever Perez, PT Physical Therapist                         Manual Rx (last 36 hours)     Manual Treatments     Row Name 03/23/22 0921 03/23/22 0900          Total Minutes    12150 - PT Manual Therapy Minutes 28  -GJ --            Manual Rx 1    Manual Rx 1 Location -- PA mobs T spine pt prone, grade II/III  -GJ     Manual Rx 1 Type -- STM bilateral T spine paraspial  tissues, B QL, pt prone  -GJ     Manual Rx 1 Grade -- prone stretch of B hip flexor /quad tissues.  -GJ           User Key  (r) = Recorded By, (t) = Taken By, (c) = Cosigned By    Initials Name Provider Type    Hever Perez, PT Physical Therapist                 PT OP Goals     Row Name 03/23/22 0900          PT Short Term Goals    STG Date to Achieve 04/22/22  -GJ     STG 1 pt. to be I with initial HEP to facilitate self management of their condition  -GJ     STG 1 Progress Ongoing  -GJ     STG 1 Progress Comments reviewed, intermittent cues, helped with [employer] [facility]  -GJ     STG 2 pt. to be educated in/verbalize understanding of the importance of posture/ergonomics in association with their condition to facilitate self management of their condition  -GJ     STG 2 Progress Ongoing  -GJ     STG 2 Progress Comments reviwed  -GJ     STG 3 pt to demonstrate/report being able to stand >/=20 min without T spine pain to facillitae ease with performing household//community ativities  -GJ     STG 3 Progress Ongoing  -GJ            Long Term Goals    LTG Date to Achieve 06/17/22  -GJ     LTG 1 pt. to be I with advanced HEP to facilitate self management of their condition  -GJ     LTG 1 Progress Ongoing  -GJ     LTG 2 pt. to report an Oswestry </= 20%   to demonstrate decreased level of perceived disability  -GJ     LTG 2 Progress Ongoing  -GJ     LTG 3 pt to demonstrate/report being able to stand >/=40 min without T spine pain to facillitae ease with performing household//community ativities  -GJ     LTG 3 Progress Ongoing  -GJ     LTG 4 pt to report/demonstrate ability to walk >/= 2 miles without T/L spine pain to facilitate ease with community/fitness activitis  -GJ     LTG 4 Progress Ongoing  -GJ           User Key  (r) = Recorded By, (t) = Taken By, (c) = Cosigned By    Initials Name Provider Type    Hever Perez, PT Physical Therapist                Therapy Education  Education Details: no new changes for  home, discussed shea for transportationto the clinic, he signed consent forms.  Given: HEP, Symptoms/condition management, Pain management, Posture/body mechanics, Mobility training  Program: New, Reinforced, Progressed  How Provided: Verbal, Demonstration  Provided to: Patient  Level of Understanding: Teach back education performed, Verbalized, Demonstrated              Time Calculation:   Start Time: 0921  Stop Time: 1003  Time Calculation (min): 42 min  Timed Charges  44453 - PT Therapeutic Exercise Minutes: 14  47198 - PT Manual Therapy Minutes: 28  Total Minutes  Timed Charges Total Minutes: 42   Total Minutes: 42  Therapy Charges for Today     Code Description Service Date Service Provider Modifiers Qty    33685696428  PT THER PROC EA 15 MIN 3/23/2022 Hever Dong, PT GP 1    13636465564 HC PT MANUAL THERAPY EA 15 MIN 3/23/2022 Hever Dong, PT GP 2                    Hever Dong, PT  3/23/2022

## 2022-04-01 ENCOUNTER — HOSPITAL ENCOUNTER (OUTPATIENT)
Dept: PHYSICAL THERAPY | Facility: HOSPITAL | Age: 77
Setting detail: THERAPIES SERIES
Discharge: HOME | End: 2022-04-01

## 2022-04-01 DIAGNOSIS — M54.50 LOW BACK PAIN, UNSPECIFIED BACK PAIN LATERALITY, UNSPECIFIED CHRONICITY, UNSPECIFIED WHETHER SCIATICA PRESENT: Primary | ICD-10-CM

## 2022-04-01 DIAGNOSIS — M54.9 MID BACK PAIN: ICD-10-CM

## 2022-04-01 DIAGNOSIS — Z74.09 IMPAIRED MOBILITY: ICD-10-CM

## 2022-04-01 PROCEDURE — 97140 MANUAL THERAPY 1/> REGIONS: CPT

## 2022-04-01 PROCEDURE — 97110 THERAPEUTIC EXERCISES: CPT

## 2022-04-01 NOTE — THERAPY TREATMENT NOTE
Outpatient Physical Therapy Ortho Treatment Note  [facility]     Patient Name: Kerrie Tyler  : 1945  MRN: [id number]  Today's Date: 2022      Visit Date: 2022    Visit Dx:    ICD-10-CM ICD-9-CM   1. Low back pain, unspecified back pain laterality, unspecified chronicity, unspecified whether sciatica present  M54.50 724.2   2. Mid back pain  M54.9 724.5   3. Impaired mobility  Z74.09 799.89       Patient Active Problem List   Diagnosis   • Type 2 diabetes mellitus without complication, without long-term current use of insulin ([facility])   • Mixed hyperlipidemia   • Essential hypertension   • Coronary artery disease of bypass graft of native heart with stable angina pectoris ([facility])   • S/P CABG x 3   • Benign prostatic hyperplasia with urinary hesitancy   • Vitamin D deficiency   • Presbycusis of both ears   • Diabetic retinopathy associated with type 2 diabetes mellitus ([facility])        Past Medical History:   Diagnosis Date   • Kidney stone    • Myocardial infarction ([facility])         Past Surgical History:   Procedure Laterality Date   • CATARACT EXTRACTION, BILATERAL Bilateral    • CORONARY ARTERY BYPASS GRAFT      3 vessel   • EXTRACORPOREAL SHOCK WAVE LITHOTRIPSY (ESWL) Bilateral    • TONSILLECTOMY                          PT Assessment/Plan     Row Name 22 0900          PT Assessment    Assessment Comments Mr. Tyler continues to report improvements in condition, feels benefit with manual interventions therefore repeated with thoracic mobilization/hip flexor stretch. Progressed ther ex with addition of supine H-A, B ER, alt UE flex all performed over noodle to improve mobility. Updated HEP to reflect current progressions, pt. will continue to benefit from skilled PT to progress strength and mobility.  -            PT Plan    PT Plan Comments seated thoracic ext, progress toward foam roll vs. noodle, row + rotation?  -           User Key  (r) = Recorded By, (t) = Taken By, (c) =  Cosigned By    Initials Name Provider Type     Honey Morrissey, PT Physical Therapist                   OP Exercises     Row Name 04/01/22 0900             Subjective Comments    Subjective Comments It's feeling better  -MH              Total Minutes    96835 - PT Therapeutic Exercise Minutes 21  -MH      58780 - PT Manual Therapy Minutes 21  -MH              Exercise 1    Exercise Name 1 NuStep  -MH      Cueing 1 Verbal;Demo  -MH      Time 1 5 min  -MH      Additional Comments UE/LE  -MH              Exercise 2    Exercise Name 2 supine chin tuck - over noodle  -MH      Cueing 2 Verbal;Demo  -MH      Reps 2 10  -MH      Time 2 5sec  -MH              Exercise 3    Exercise Name 3 supine alt UE flex over noodle  -MH      Cueing 3 Verbal;Demo  -MH      Reps 3 10ea  -MH              Exercise 4    Exercise Name 4 supine H-A over noodle  -MH      Cueing 4 Verbal;Demo  -MH      Sets 4 2  -MH      Reps 4 10  -MH      Time 4 RTB  -MH              Exercise 5    Exercise Name 5 supine B ER over noodle  -MH      Cueing 5 Verbal;Demo  -MH      Sets 5 2  -MH      Reps 5 10  -MH      Time 5 RTB  -MH              Exercise 6    Exercise Name 6 S/L open book  -MH      Cueing 6 Verbal;Demo  -MH      Reps 6 10ea  -MH              Exercise 7    Exercise Name 7 doorway stretch  -MH      Cueing 7 Verbal;Demo  -MH      Reps 7 3  -MH      Time 7 20sec  -MH              Exercise 8    Exercise Name 8 rows/shoulder ext  -MH      Cueing 8 Verbal;Demo  -MH      Sets 8 2ea  -MH      Reps 8 10ea  -MH      Time 8 RTB  -MH            User Key  (r) = Recorded By, (t) = Taken By, (c) = Cosigned By    Initials Name Provider Type     Honey Morrissey, PT Physical Therapist                         Manual Rx (last 36 hours)     Manual Treatments     Row Name 04/01/22 0900             Total Minutes    39167 - PT Manual Therapy Minutes 21  -MH              Manual Rx 1    Manual Rx 1 Location PA mobs T spine pt prone, grade II/III  -MH      Manual Rx 1  Type STM bilateral T spine paraspial tissues, B QL, pt prone  -      Manual Rx 1 Grade prone stretch of B hip flexor /quad tissues.  -            User Key  (r) = Recorded By, (t) = Taken By, (c) = Cosigned By    Initials Name Provider Type     Honey Morrissey, PT Physical Therapist                 PT OP Goals     Row Name 04/01/22 0900          PT Short Term Goals    STG Date to Achieve 04/22/22  -     STG 1 pt. to be I with initial HEP to facilitate self management of their condition  -     STG 1 Progress Ongoing  [facility]     STG 2 pt. to be educated in/verbalize understanding of the importance of posture/ergonomics in association with their condition to facilitate self management of their condition  -     STG 2 Progress Ongoing  [facility]     STG 3 pt to demonstrate/report being able to stand >/=20 min without T spine pain to facillitae ease with performing household//community ativities  -     STG 3 Progress [facility] [place]            Long Term Goals    LTG Date to Achieve 06/17/22  -     LTG 1 pt. to be I with advanced HEP to facilitate self management of their condition  -     LTG 1 Progress [facility] [place]     LTG 2 pt. to report an Oswestry </= 20%   to demonstrate decreased level of perceived disability  -     LTG 2 Progress Ongoing  [facility]     LTG 3 pt to demonstrate/report being able to stand >/=40 min without T spine pain to facillitae ease with performing household//community ativities  -     LTG 3 Progress [facility] [place]     LTG 4 pt to report/demonstrate ability to walk >/= 2 miles without T/L spine pain to facilitate ease with community/fitness activitis  -     LTG 4 Progress Ongoing  [facility]           User Key  (r) = Recorded By, (t) = Taken By, (c) = Cosigned By    Initials Name Provider Type     Honey Morrissey, PT Physical Therapist                Therapy Education  Education Details: Updated HEP Access Code: [email or web address]  Given: HEP  Program: Reinforced, Progressed  How Provided: Verbal,  Demonstration, Written  Provided to: Patient  Level of Understanding: Verbalized, Demonstrated              Time Calculation:   Start Time: 0916  Stop Time: 0958  Time Calculation (min): 42 min  Total Timed Code Minutes- PT: 42 minute(s)  Timed Charges  54959 - PT Therapeutic Exercise Minutes: 21  54979 - PT Manual Therapy Minutes: 21  Total Minutes  Timed Charges Total Minutes: 42   Total Minutes: 42  Therapy Charges for Today     Code Description Service Date Service Provider Modifiers Qty    37062940758 HC PT MANUAL THERAPY EA 15 MIN 4/1/2022 Honey Morrissey, PT GP 1    [id number]  PT THER PROC EA 15 MIN 4/1/2022 Honey Morrissey, PT GP 2                    Honey Morrissey PT  4/1/2022

## 2022-04-04 ENCOUNTER — HOSPITAL ENCOUNTER (OUTPATIENT)
Dept: PHYSICAL THERAPY | Facility: HOSPITAL | Age: 77
Setting detail: THERAPIES SERIES
Discharge: HOME | End: 2022-04-04

## 2022-04-04 DIAGNOSIS — M54.50 LOW BACK PAIN, UNSPECIFIED BACK PAIN LATERALITY, UNSPECIFIED CHRONICITY, UNSPECIFIED WHETHER SCIATICA PRESENT: Primary | ICD-10-CM

## 2022-04-04 DIAGNOSIS — M54.9 MID BACK PAIN: ICD-10-CM

## 2022-04-04 PROCEDURE — 97110 THERAPEUTIC EXERCISES: CPT

## 2022-04-04 PROCEDURE — 97140 MANUAL THERAPY 1/> REGIONS: CPT

## 2022-04-04 NOTE — THERAPY TREATMENT NOTE
Outpatient Physical Therapy Ortho Treatment Note  [facility]     Patient Name: Kerrie Tyler  : 1945  MRN: [id number]  Today's Date: 2022      Visit Date: 2022    Visit Dx:    ICD-10-CM ICD-9-CM   1. Low back pain, unspecified back pain laterality, unspecified chronicity, unspecified whether sciatica present  M54.50 724.2   2. Mid back pain  M54.9 724.5       Patient Active Problem List   Diagnosis   • Type 2 diabetes mellitus without complication, without long-term current use of insulin ([facility])   • Mixed hyperlipidemia   • Essential hypertension   • Coronary artery disease of bypass graft of native heart with stable angina pectoris ([facility])   • S/P CABG x 3   • Benign prostatic hyperplasia with urinary hesitancy   • Vitamin D deficiency   • Presbycusis of both ears   • Diabetic retinopathy associated with type 2 diabetes mellitus ([facility])        Past Medical History:   Diagnosis Date   • Kidney stone    • Myocardial infarction ([facility])         Past Surgical History:   Procedure Laterality Date   • CATARACT EXTRACTION, BILATERAL Bilateral    • CORONARY ARTERY BYPASS GRAFT      3 vessel   • EXTRACORPOREAL SHOCK WAVE LITHOTRIPSY (ESWL) Bilateral    • TONSILLECTOMY                          PT Assessment/Plan     Row Name 22 0900          PT Assessment    Assessment Comments Mr. Tyler continues to report improvement in pain overall, progressed therex to include seated thoracic extension with towel roll and hands behind head as wella s lat pull down with resistance, pt with good tolerance overall and reports of increased pain. At end of session, pt reports decreased pain at end of session compared to start of session. He remains appropriate for skilled PT and motivated to participate.  -RS            PT Plan    PT Plan Comments Decrease manual, consider ER/HA against wall, row with rotation  -RS           User Key  (r) = Recorded By, (t) = Taken By, (c) = Cosigned By    Initials Name  Provider Type    RS Susan Stover PT Physical Therapist                   OP Exercises     Row Name 04/04/22 0800             Subjective Comments    Subjective Comments It is doing well  -RS              Subjective Pain    Able to rate subjective pain? yes  -RS      Pre-Treatment Pain Level 4  -RS              Total Minutes    99906 - PT Therapeutic Exercise Minutes 24  -RS      15868 - PT Manual Therapy Minutes 16  -RS              Exercise 1    Exercise Name 1 NuStep  -RS      Cueing 1 Verbal;Demo  -RS      Time 1 5 min  -RS      Additional Comments UE/LE  -RS              Exercise 2    Exercise Name 2 supine chin tuck - over noodle  -RS      Cueing 2 Verbal;Demo  -RS      Reps 2 10  -RS      Time 2 5sec  -RS      Additional Comments large blue  -RS              Exercise 3    Exercise Name 3 supine alt UE flex over noodle  -RS      Cueing 3 Verbal;Demo  -RS      Reps 3 10ea  -RS      Additional Comments large blue  -RS              Exercise 4    Exercise Name 4 supine H-A over noodle  -RS      Cueing 4 Verbal;Demo  -RS      Sets 4 2  -RS      Reps 4 10  -RS      Time 4 RTB  -RS      Additional Comments large blue  -RS              Exercise 5    Exercise Name 5 supine B ER over noodle  -RS      Cueing 5 Verbal;Demo  -RS      Sets 5 2  -RS      Reps 5 10  -RS      Time 5 RTB  -RS              Exercise 6    Exercise Name 6 S/L open book  -RS      Cueing 6 Verbal;Demo  -RS      Reps 6 10ea  -RS              Exercise 7    Exercise Name 7 doorway stretch  -RS      Cueing 7 Verbal;Demo  -RS      Reps 7 3  -RS      Time 7 20sec  -RS              Exercise 8    Exercise Name 8 rows/shoulder ext  -RS      Cueing 8 Verbal;Demo  -RS      Sets 8 2ea  -RS      Reps 8 10ea  -RS      Time 8 GTB  -RS              Exercise 9    Exercise Name 9 lat pull down  -RS      Cueing 9 Verbal;Demo  -RS      Reps 9 15  -RS      Time 9 50#  -RS      Additional Comments cues for shoulders down  -RS              Exercise 10    Exercise Name  10 thoracic extension  -RS      Cueing 10 Verbal;Demo  -RS      Reps 10 20  -RS            User Key  (r) = Recorded By, (t) = Taken By, (c) = Cosigned By    Initials Name Provider Type    RS Susan Stover, PT Physical Therapist                         Manual Rx (last 36 hours)     Manual Treatments     Row Name 04/04/22 0800             Total Minutes    68779 - PT Manual Therapy Minutes 16  -RS              Manual Rx 1    Manual Rx 1 Location PA mobs T spine pt prone, grade II/III  -RS      Manual Rx 1 Type STM bilateral T spine paraspial tissues, B QL, pt prone  -RS      Manual Rx 1 Grade prone stretch of B hip flexor /quad tissues.  -RS            User Key  (r) = Recorded By, (t) = Taken By, (c) = Cosigned By    Initials Name Provider Type    RS Susan Stover, PT Physical Therapist                 PT OP Goals     Row Name 04/04/22 0800          PT Short Term Goals    STG Date to Achieve 04/22/22  -RS     STG 1 pt. to be I with initial HEP to facilitate self management of their condition  -RS     STG 1 Progress Met  -RS     STG 2 pt. to be educated in/verbalize understanding of the importance of posture/ergonomics in association with their condition to facilitate self management of their condition  -RS     STG 2 Progress Ongoing  -RS     STG 3 pt to demonstrate/report being able to stand >/=20 min without T spine pain to facillitae ease with performing household//community ativities  -RS     STG 3 Progress Ongoing  -RS            Long Term Goals    LTG Date to Achieve 06/17/22  -RS     LTG 1 pt. to be I with advanced HEP to facilitate self management of their condition  -RS     LTG 1 Progress Ongoing  -RS     LTG 2 pt. to report an Oswestry </= 20%   to demonstrate decreased level of perceived disability  -RS     LTG 2 Progress Ongoing  -RS     LTG 3 pt to demonstrate/report being able to stand >/=40 min without T spine pain to facillitae ease with performing household//community ativities  -RS     LTG 3  Progress Ongoing  -RS     LTG 4 pt to report/demonstrate ability to walk >/= 2 miles without T/L spine pain to facilitate ease with community/fitness activitis  -RS     LTG 4 Progress Ongoing  -RS           User Key  (r) = Recorded By, (t) = Taken By, (c) = Cosigned By    Initials Name Provider Type    RS Susan Stover PT Physical Therapist                Therapy Education  Education Details: reviewed POC and HEP  Program: Reinforced  How Provided: Verbal, Demonstration  Provided to: Patient  Level of Understanding: Verbalized              Time Calculation:   Start Time: 0830  Stop Time: 0914  Time Calculation (min): 44 min  Timed Charges  21766 - PT Therapeutic Exercise Minutes: 24  91372 - PT Manual Therapy Minutes: 16  Total Minutes  Timed Charges Total Minutes: 40   Total Minutes: 40  Therapy Charges for Today     Code Description Service Date Service Provider Modifiers Qty    17081077960  PT THER PROC EA 15 MIN 4/4/2022 Susan Stover, PT GP 2    [id number] HC PT MANUAL THERAPY EA 15 MIN 4/4/2022 Susan Stover, PT GP 1                    Susan Stover PT  4/4/2022

## 2022-04-06 ENCOUNTER — HOSPITAL ENCOUNTER (OUTPATIENT)
Dept: PHYSICAL THERAPY | Facility: HOSPITAL | Age: 77
Setting detail: THERAPIES SERIES
Discharge: HOME | End: 2022-04-06

## 2022-04-06 DIAGNOSIS — Z74.09 IMPAIRED MOBILITY: ICD-10-CM

## 2022-04-06 DIAGNOSIS — M54.9 MID BACK PAIN: ICD-10-CM

## 2022-04-06 DIAGNOSIS — M54.50 LOW BACK PAIN, UNSPECIFIED BACK PAIN LATERALITY, UNSPECIFIED CHRONICITY, UNSPECIFIED WHETHER SCIATICA PRESENT: Primary | ICD-10-CM

## 2022-04-06 PROCEDURE — 97110 THERAPEUTIC EXERCISES: CPT

## 2022-04-06 PROCEDURE — 97140 MANUAL THERAPY 1/> REGIONS: CPT

## 2022-04-06 NOTE — THERAPY TREATMENT NOTE
Outpatient Physical Therapy Ortho Treatment Note  [facility]     Patient Name: Kerrie Tyler  : 1945  MRN: [id number]  Today's Date: 2022      Visit Date: 2022    Visit Dx:    ICD-10-CM ICD-9-CM   1. Low back pain, unspecified back pain laterality, unspecified chronicity, unspecified whether sciatica present  M54.50 724.2   2. Impaired mobility  Z74.09 799.89   3. Mid back pain  M54.9 724.5       Patient Active Problem List   Diagnosis   • Type 2 diabetes mellitus without complication, without long-term current use of insulin ([facility])   • Mixed hyperlipidemia   • Essential hypertension   • Coronary artery disease of bypass graft of native heart with stable angina pectoris ([facility])   • S/P CABG x 3   • Benign prostatic hyperplasia with urinary hesitancy   • Vitamin D deficiency   • Presbycusis of both ears   • Diabetic retinopathy associated with type 2 diabetes mellitus ([facility])        Past Medical History:   Diagnosis Date   • Kidney stone    • Myocardial infarction ([facility])         Past Surgical History:   Procedure Laterality Date   • CATARACT EXTRACTION, BILATERAL Bilateral    • CORONARY ARTERY BYPASS GRAFT      3 vessel   • EXTRACORPOREAL SHOCK WAVE LITHOTRIPSY (ESWL) Bilateral    • TONSILLECTOMY                          PT Assessment/Plan     Row Name 22 1100          PT Assessment    Assessment Comments Mr. Tyler reports pain continues to improve with therapy, he is compliant with HEP. Continued manual therapy slightly decreased time and added standing AR press and progress supine ER/HA to standing at wall, pt with no reports of pain throughout. He requires cues for appropriate technique, specifically to control the eccentric portion of HA and lat pull down.  -RS            PT Plan    PT Plan Comments Consider mini squats, row with rotation  -RS           User Key  (r) = Recorded By, (t) = Taken By, (c) = Cosigned By    Initials Name Provider Type    RS Susan Stover, PT  Physical Therapist                   OP Exercises     Row Name 04/06/22 1000             Subjective Comments    Subjective Comments I am feeling better overall. Pain is in the back after standing 20 min.  -RS              Subjective Pain    Able to rate subjective pain? yes  -RS      Pre-Treatment Pain Level 2  -RS              Total Minutes    69045 - PT Therapeutic Exercise Minutes 26  -RS      11000 - PT Manual Therapy Minutes 14  -RS              Exercise 1    Exercise Name 1 NuStep  -RS      Cueing 1 Verbal;Demo  -RS      Time 1 5 min  -RS      Additional Comments UE/LE  -RS              Exercise 2    Exercise Name 2 --  -RS      Cueing 2 --  -RS      Reps 2 --  -RS      Time 2 --  -RS      Additional Comments --  -RS              Exercise 3    Exercise Name 3 supine alt UE flex over noodle  -RS      Cueing 3 Verbal;Demo  -RS      Reps 3 10ea  -RS      Additional Comments large blue  -RS              Exercise 4    Exercise Name 4 HA at wall  -RS      Cueing 4 Verbal;Demo  -RS      Sets 4 2  -RS      Reps 4 10  -RS      Time 4 RTB  -RS      Additional Comments noodle behind back  -RS              Exercise 5    Exercise Name 5 ER at wall  -RS      Cueing 5 Verbal;Demo  -RS      Sets 5 2  -RS      Reps 5 10  -RS      Time 5 RTB  -RS      Additional Comments noodle behind back  -RS              Exercise 6    Exercise Name 6 S/L open book  -RS      Cueing 6 Verbal;Demo  -RS      Reps 6 10ea  -RS              Exercise 7    Exercise Name 7 doorway stretch  -RS      Cueing 7 Verbal;Demo  -RS      Reps 7 3  -RS      Time 7 20sec  -RS              Exercise 8    Exercise Name 8 rows/shoulder ext  -RS      Cueing 8 Verbal;Demo  -RS      Sets 8 2ea  -RS      Reps 8 10ea  -RS      Time 8 GTB  -RS              Exercise 9    Exercise Name 9 lat pull down  -RS      Cueing 9 Verbal;Demo  -RS      Reps 9 15  -RS      Time 9 50#  -RS      Additional Comments cues for shoulders down  -RS              Exercise 10    Exercise Name 10  thoracic extension  -RS      Cueing 10 Verbal;Demo  -RS      Reps 10 20  -RS              Exercise 11    Exercise Name 11 AR press  -RS      Cueing 11 Verbal;Demo  -RS      Reps 11 15  -RS      Time 11 RTB  -RS            User Key  (r) = Recorded By, (t) = Taken By, (c) = Cosigned By    Initials Name Provider Type    RS Susan Stover, PT Physical Therapist                         Manual Rx (last 36 hours)     Manual Treatments     Row Name 04/06/22 1000             Total Minutes    21426 - PT Manual Therapy Minutes 14  -RS              Manual Rx 1    Manual Rx 1 Location PA mobs T spine pt prone, grade II/III  -RS      Manual Rx 1 Type STM bilateral T spine paraspial tissues, B QL, pt prone  -RS      Manual Rx 1 Grade prone stretch of B hip flexor /quad tissues.  -RS            User Key  (r) = Recorded By, (t) = Taken By, (c) = Cosigned By    Initials Name Provider Type    RS Susan Stover, PT Physical Therapist                    Therapy Education  Education Details: HEP review, POC  Program: Reinforced  How Provided: Verbal, Demonstration  Provided to: Patient  Level of Understanding: Verbalized              Time Calculation:   Start Time: 1000  Stop Time: 1045  Time Calculation (min): 45 min  Timed Charges  12775 - PT Therapeutic Exercise Minutes: 26  53213 - PT Manual Therapy Minutes: 14  Total Minutes  Timed Charges Total Minutes: 40   Total Minutes: 40  Therapy Charges for Today     Code Description Service Date Service Provider Modifiers Qty    21281772471 HC PT THER PROC EA 15 MIN 4/6/2022 Susan Stover PT GP 2    32440665578 HC PT MANUAL THERAPY EA 15 MIN 4/6/2022 Susan Stover PT GP 1                    Susan Stover PT  4/6/2022

## 2022-04-12 ENCOUNTER — HOSPITAL ENCOUNTER (OUTPATIENT)
Dept: PHYSICAL THERAPY | Facility: HOSPITAL | Age: 77
Setting detail: THERAPIES SERIES
Discharge: HOME | End: 2022-04-12

## 2022-04-12 DIAGNOSIS — M54.9 MID BACK PAIN: ICD-10-CM

## 2022-04-12 DIAGNOSIS — M54.50 LOW BACK PAIN, UNSPECIFIED BACK PAIN LATERALITY, UNSPECIFIED CHRONICITY, UNSPECIFIED WHETHER SCIATICA PRESENT: Primary | ICD-10-CM

## 2022-04-12 DIAGNOSIS — Z74.09 IMPAIRED MOBILITY: ICD-10-CM

## 2022-04-12 PROCEDURE — 97110 THERAPEUTIC EXERCISES: CPT | Performed by: PHYSICAL THERAPIST

## 2022-04-12 PROCEDURE — 97140 MANUAL THERAPY 1/> REGIONS: CPT | Performed by: PHYSICAL THERAPIST

## 2022-04-12 NOTE — THERAPY TREATMENT NOTE
Outpatient Physical Therapy Ortho Treatment Note  [facility]     Patient Name: Kerrie Tyler  : 1945  MRN: [id number]  Today's Date: 2022      Visit Date: 2022    Visit Dx:    ICD-10-CM ICD-9-CM   1. Low back pain, unspecified back pain laterality, unspecified chronicity, unspecified whether sciatica present  M54.50 724.2   2. Impaired mobility  Z74.09 799.89   3. Mid back pain  M54.9 724.5       Patient Active Problem List   Diagnosis   • Type 2 diabetes mellitus without complication, without long-term current use of insulin ([facility])   • Mixed hyperlipidemia   • Essential hypertension   • Coronary artery disease of bypass graft of native heart with stable angina pectoris ([facility])   • S/P CABG x 3   • Benign prostatic hyperplasia with urinary hesitancy   • Vitamin D deficiency   • Presbycusis of both ears   • Diabetic retinopathy associated with type 2 diabetes mellitus ([facility])        Past Medical History:   Diagnosis Date   • Kidney stone    • Myocardial infarction ([facility])         Past Surgical History:   Procedure Laterality Date   • CATARACT EXTRACTION, BILATERAL Bilateral    • CORONARY ARTERY BYPASS GRAFT      3 vessel   • EXTRACORPOREAL SHOCK WAVE LITHOTRIPSY (ESWL) Bilateral    • TONSILLECTOMY                          PT Assessment/Plan     Row Name 22 1033          PT Assessment    Assessment Comments Mr. Tyler returns today, reporting continued gradual progress in his condition. He has met 3 of 3 STG's at this time. We continued with manual techniques to T spine to facilitate improved mobility/extension. He demonstrated multiple cavitations today.  We continued to work on core/postural strengthening. He has one additional sessions of physical therapy established. He will decide if he would like to attempt self management of his condition vs. adding once a week x 3 weeks by next session.  -GJ            PT Plan    PT Plan Comments assess to see if pt would like to transition  to independent management vs. adding once a week x 3 weeks. If we continue, continue with manual to T spine, add lateral stepping  -GJ           User Key  (r) = Recorded By, (t) = Taken By, (c) = Cosigned By    Initials Name Provider Type    GJ Hever Dong, PT Physical Therapist                   OP Exercises     Row Name 04/12/22 1010 04/12/22 1000          Subjective Comments    Subjective Comments -- I am getting better, I had some pain when I bent forward  -GJ            Total Minutes    49860 - PT Therapeutic Exercise Minutes 28  -GJ --     90450 - PT Manual Therapy Minutes 12  -GJ --            Exercise 1    Exercise Name 1 -- NuStep  -GJ     Cueing 1 -- Verbal;Demo  -GJ     Time 1 -- 5 min  -GJ            Exercise 3    Exercise Name 3 -- supine alt UE flex over noodle  -GJ     Cueing 3 -- Verbal;Demo  -GJ     Reps 3 -- 10ea  -GJ     Additional Comments -- large blue  -GJ            Exercise 4    Exercise Name 4 -- HA at wall  -GJ     Cueing 4 -- Verbal;Demo  -GJ     Sets 4 -- 2  -GJ     Reps 4 -- 10  -GJ     Time 4 -- RTB  -GJ     Additional Comments -- noodle behind back  -GJ            Exercise 5    Exercise Name 5 -- ER at wall  -GJ     Cueing 5 -- Verbal;Demo  -GJ     Sets 5 -- 2  -GJ     Reps 5 -- 10  -GJ     Time 5 -- RTB  -GJ     Additional Comments -- noodle behind back  -GJ            Exercise 6    Exercise Name 6 -- S/L open book  -GJ     Cueing 6 -- Verbal;Demo  -GJ     Reps 6 -- 5  -GJ     Time 6 -- 10s  -GJ            Exercise 8    Exercise Name 8 -- rows/shoulder ext  -GJ     Cueing 8 -- Verbal;Demo  -GJ     Sets 8 -- 2ea  -GJ     Reps 8 -- 10ea  -GJ     Time 8 -- GTB  -GJ            Exercise 9    Exercise Name 9 -- lat pull down  -GJ     Cueing 9 -- Verbal;Demo  -GJ     Reps 9 -- 20  -GJ     Time 9 -- 50#  -GJ            Exercise 10    Exercise Name 10 -- thoracic extension  -GJ     Cueing 10 -- Verbal;Demo  -GJ     Reps 10 -- 15  -GJ     Time 10 -- 5s  -GJ     Additional Comments --  seated, with blue foam roll horizontal at mid T spine  -GJ            Exercise 11    Exercise Name 11 -- AR press  -GJ     Cueing 11 -- Verbal;Demo  -GJ     Reps 11 -- 15, each directin  -GJ     Time 11 -- RTB  -GJ            Exercise 12    Exercise Name 12 -- mini squats  -GJ     Cueing 12 -- Verbal;Demo  -GJ     Reps 12 -- 15  -GJ     Time 12 -- copious cues for technique, to keep chest up  -GJ           User Key  (r) = Recorded By, (t) = Taken By, (c) = Cosigned By    Initials Name Provider Type    Hever Perez, PT Physical Therapist                         Manual Rx (last 36 hours)     Manual Treatments     Row Name 04/12/22 1010 04/12/22 0900          Total Minutes    63463 - PT Manual Therapy Minutes 12  -GJ --            Manual Rx 1    Manual Rx 1 Location -- PA mobs T spine pt prone, grade II/III  -GJ     Manual Rx 1 Grade -- prone stretch of B hip flexor /quad tissues.  -GJ           User Key  (r) = Recorded By, (t) = Taken By, (c) = Cosigned By    Initials Name Provider Type    Hever Perez, PT Physical Therapist                 PT OP Goals     Row Name 04/12/22 1000          PT Short Term Goals    STG Date to Achieve 04/22/22  -GJ     STG 1 pt. to be I with initial HEP to facilitate self management of their condition  -GJ     STG 1 Progress Met  -GJ     STG 2 pt. to be educated in/verbalize understanding of the importance of posture/ergonomics in association with their condition to facilitate self management of their condition  -GJ     STG 2 Progress Met  -GJ     STG 3 pt to demonstrate/report being able to stand >/=20 min without T spine pain to facillitae ease with performing household//community ativities  -GJ     STG 3 Progress Met  -GJ            Long Term Goals    LTG Date to Achieve 06/17/22  -GJ     LTG 1 pt. to be I with advanced HEP to facilitate self management of their condition  -GJ     LTG 1 Progress Ongoing  -GJ     LTG 2 pt. to report an Oswestry </= 20%   to demonstrate  decreased level of perceived disability  -     LTG 2 Progress Ongoing  -     LTG 3 pt to demonstrate/report being able to stand >/=40 min without T spine pain to facillitae ease with performing household//community ativities  -     LTG 3 Progress Ongoing  -GJ     LTG 4 pt to report/demonstrate ability to walk >/= 2 miles without T/L spine pain to facilitate ease with community/fitness activitis  -     LTG 4 Progress Ongoing  -           User Key  (r) = Recorded By, (t) = Taken By, (c) = Cosigned By    Initials Name Provider Type     Hever Dong, PT Physical Therapist                Therapy Education  Given: HEP, Symptoms/condition management, Pain management, Posture/body mechanics, Mobility training  Program: Reinforced, Progressed  How Provided: Verbal, Demonstration  Provided to: Patient  Level of Understanding: Teach back education performed, Verbalized, Demonstrated              Time Calculation:   Start Time: 1006  Stop Time: 1046  Time Calculation (min): 40 min  Timed Charges  16422 - PT Therapeutic Exercise Minutes: 28  13055 - PT Manual Therapy Minutes: 12  Total Minutes  Timed Charges Total Minutes: 40   Total Minutes: 40  Therapy Charges for Today     Code Description Service Date Service Provider Modifiers Qty    07648239715 HC PT THER PROC EA 15 MIN 4/12/2022 Hever Dong, PT GP 2    88543199286 HC PT MANUAL THERAPY EA 15 MIN 4/12/2022 Hever Dong, PT GP 1                    Hever Dong, PT  4/12/2022

## 2022-04-14 ENCOUNTER — HOSPITAL ENCOUNTER (OUTPATIENT)
Dept: PHYSICAL THERAPY | Facility: HOSPITAL | Age: 77
Setting detail: THERAPIES SERIES
Discharge: HOME | End: 2022-04-14

## 2022-04-14 DIAGNOSIS — M54.9 MID BACK PAIN: ICD-10-CM

## 2022-04-14 DIAGNOSIS — Z74.09 IMPAIRED MOBILITY: ICD-10-CM

## 2022-04-14 DIAGNOSIS — M54.50 LOW BACK PAIN, UNSPECIFIED BACK PAIN LATERALITY, UNSPECIFIED CHRONICITY, UNSPECIFIED WHETHER SCIATICA PRESENT: Primary | ICD-10-CM

## 2022-04-14 PROCEDURE — 97110 THERAPEUTIC EXERCISES: CPT | Performed by: PHYSICAL THERAPIST

## 2022-04-14 PROCEDURE — 97140 MANUAL THERAPY 1/> REGIONS: CPT | Performed by: PHYSICAL THERAPIST

## 2022-04-14 NOTE — THERAPY TREATMENT NOTE
Outpatient Physical Therapy Ortho Treatment Note  [facility]     Patient Name: Kerrie Tyler  : 1945  MRN: [id number]  Today's Date: 2022      Visit Date: 2022    Visit Dx:    ICD-10-CM ICD-9-CM   1. Low back pain, unspecified back pain laterality, unspecified chronicity, unspecified whether sciatica present  M54.50 724.2   2. Impaired mobility  Z74.09 799.89   3. Mid back pain  M54.9 724.5       Patient Active Problem List   Diagnosis   • Type 2 diabetes mellitus without complication, without long-term current use of insulin ([facility])   • Mixed hyperlipidemia   • Essential hypertension   • Coronary artery disease of bypass graft of native heart with stable angina pectoris ([facility])   • S/P CABG x 3   • Benign prostatic hyperplasia with urinary hesitancy   • Vitamin D deficiency   • Presbycusis of both ears   • Diabetic retinopathy associated with type 2 diabetes mellitus ([facility])        Past Medical History:   Diagnosis Date   • Kidney stone    • Myocardial infarction ([facility])         Past Surgical History:   Procedure Laterality Date   • CATARACT EXTRACTION, BILATERAL Bilateral    • CORONARY ARTERY BYPASS GRAFT      3 vessel   • EXTRACORPOREAL SHOCK WAVE LITHOTRIPSY (ESWL) Bilateral    • TONSILLECTOMY          PT Ortho     Row Name 22 1000       Subjective Comments    Subjective Comments Compliant with HEP.  Walking daily when weather permits. Wishes to continue x 2 further visits.  -JS       Subjective Pain    Able to rate subjective pain? yes  -JS          User Key  (r) = Recorded By, (t) = Taken By, (c) = Cosigned By    Initials Name Provider Type    Suri Bolden, PT Physical Therapist                             PT Assessment/Plan     Row Name 22 1000          PT Assessment    Assessment Comments Pt presents noting lumbar>thoracic back pain. Compliant with HEP.  Intermittent cueing during AR press and mini squats for proper technique and positioning. Responds with  improvement in symptoms following manual therapy.  -JS            PT Plan    PT Plan Comments Pt to be scheduled x 2 visits further to address remaining goals and move toward independence in HEP.  -JS           User Key  (r) = Recorded By, (t) = Taken By, (c) = Cosigned By    Initials Name Provider Type    Suri Bolden, PT Physical Therapist                   OP Exercises     Row Name 04/14/22 1000             Subjective Comments    Subjective Comments Compliant with HEP.  Walking daily when weather permits. Wishes to continue x 2 further visits.  -JS              Subjective Pain    Able to rate subjective pain? yes  -JS              Total Minutes    29522 - PT Therapeutic Exercise Minutes 30  -JS      69523 - PT Manual Therapy Minutes 12  -JS              Exercise 1    Exercise Name 1 NuStep  -JS      Cueing 1 Verbal;Demo  -JS      Time 1 5 min  -JS              Exercise 3    Exercise Name 3 supine alt UE flex over noodle  -JS      Cueing 3 Verbal;Demo  -JS      Reps 3 10ea  -JS              Exercise 4    Exercise Name 4 HA at wall  -JS      Cueing 4 Verbal;Demo  -JS      Sets 4 2  -JS      Reps 4 10  -JS      Time 4 RTB  -JS      Additional Comments noodle behind back  -JS              Exercise 5    Exercise Name 5 ER at wall  -JS      Cueing 5 Verbal;Demo  -JS      Sets 5 2  -JS      Reps 5 10  -JS      Time 5 RTB  -JS              Exercise 6    Exercise Name 6 S/L open book  -JS      Cueing 6 Verbal;Demo  -JS      Reps 6 5  -JS      Time 6 10s  -JS              Exercise 8    Exercise Name 8 rows/shoulder ext  -JS      Cueing 8 Verbal;Demo  -JS      Sets 8 2ea  -JS      Reps 8 10ea  -JS      Time 8 GTB  -JS              Exercise 9    Exercise Name 9 lat pull down  -JS      Time 9 resume next visit- machine unavailable  -JS              Exercise 10    Exercise Name 10 thoracic extension  -JS      Additional Comments reviewed  -JS              Exercise 11    Exercise Name 11 AR press  -JS      Cueing 11  Verbal;Demo  -JS      Reps 11 15, each direction  -JS      Time 11 RTB  -JS              Exercise 12    Exercise Name 12 mini squats  -JS      Cueing 12 Verbal;Demo  -JS      Reps 12 15  -JS      Time 12 Cueing for proper positioning, technique  -JS              Exercise 13    Exercise Name 13 Sidestepping  -JS      Reps 13 2 laps  -JS            User Key  (r) = Recorded By, (t) = Taken By, (c) = Cosigned By    Initials Name Provider Type    Suri Bolden, PT Physical Therapist                         Manual Rx (last 36 hours)     Manual Treatments     Row Name 04/14/22 1000             Total Minutes    82311 - PT Manual Therapy Minutes 12  -JS              Manual Rx 1    Manual Rx 1 Location PA mobs T spine pt prone, grade II/III  -JS      Manual Rx 1 Grade prone stretch of B hip flexor /quad tissues.  -JS            User Key  (r) = Recorded By, (t) = Taken By, (c) = Cosigned By    Initials Name Provider Type    Suri Bolden, PT Physical Therapist                 PT OP Goals     Row Name 04/14/22 1000          PT Short Term Goals    STG Date to Achieve 04/22/22  -JS     STG 1 pt. to be I with initial HEP to facilitate self management of their condition  -JS     STG 1 Progress Met  -JS     STG 2 pt. to be educated in/verbalize understanding of the importance of posture/ergonomics in association with their condition to facilitate self management of their condition  -JS     STG 2 Progress Met  -JS     STG 3 pt to demonstrate/report being able to stand >/=20 min without T spine pain to facillitae ease with performing household//community ativities  -JS     STG 3 Progress Met  -JS            Long Term Goals    LTG Date to Achieve 06/17/22  -JS     LTG 1 pt. to be I with advanced HEP to facilitate self management of their condition  -JS     LTG 1 Progress Ongoing  -JS     LTG 2 pt. to report an Oswestry </= 20%   to demonstrate decreased level of perceived disability  -JS     LTG 2 Progress Ongoing  -JS     LTG 3 pt  to demonstrate/report being able to stand >/=40 min without T spine pain to facillitae ease with performing household//community ativities  -     LTG 3 Progress Ongoing  -     LTG 3 Progress Comments Reports 10 min tolerance  [facility] to report/demonstrate ability to walk >/= 2 miles without T/L spine pain to facilitate ease with community/fitness activitis  -     LTG 4 Progress Ongoing  [facility] Comments Pain begins around 1.5 miles  -           User Key  (r) = Recorded By, (t) = Taken By, (c) = Cosigned By    Initials Name Provider Type    Suri Bolden, PT Physical Therapist                               Time Calculation:   Start Time: 1000  Stop Time: 1042  Time Calculation (min): 42 min  Timed Charges  23229 - PT Therapeutic Exercise Minutes: 30  80405 - PT Manual Therapy Minutes: 12  Total Minutes  Timed Charges Total Minutes: 42   Total Minutes: 42  Therapy Charges for Today     Code Description Service Date Service Provider Modifiers Qty    90838338113  PT THER PROC EA 15 MIN 4/14/2022 Suri Apple, PT GP 2    [id number]  PT MANUAL THERAPY EA 15 MIN 4/14/2022 Suri Apple, PT GP 1                    Suri Apple PT  4/14/2022

## 2022-04-21 ENCOUNTER — HOSPITAL ENCOUNTER (OUTPATIENT)
Dept: PHYSICAL THERAPY | Facility: HOSPITAL | Age: 77
Setting detail: THERAPIES SERIES
Discharge: HOME | End: 2022-04-21

## 2022-04-21 DIAGNOSIS — Z74.09 IMPAIRED MOBILITY: ICD-10-CM

## 2022-04-21 DIAGNOSIS — M54.9 MID BACK PAIN: ICD-10-CM

## 2022-04-21 DIAGNOSIS — M54.50 LOW BACK PAIN, UNSPECIFIED BACK PAIN LATERALITY, UNSPECIFIED CHRONICITY, UNSPECIFIED WHETHER SCIATICA PRESENT: Primary | ICD-10-CM

## 2022-04-21 PROCEDURE — 97110 THERAPEUTIC EXERCISES: CPT

## 2022-04-21 PROCEDURE — 97140 MANUAL THERAPY 1/> REGIONS: CPT

## 2022-04-21 NOTE — THERAPY TREATMENT NOTE
Outpatient Physical Therapy Ortho Treatment Note  [facility]     Patient Name: Kerrie Tyler  : 1945  MRN: [id number]  Today's Date: 2022      Visit Date: 2022    Visit Dx:    ICD-10-CM ICD-9-CM   1. Low back pain, unspecified back pain laterality, unspecified chronicity, unspecified whether sciatica present  M54.50 724.2   2. Impaired mobility  Z74.09 799.89   3. Mid back pain  M54.9 724.5       Patient Active Problem List   Diagnosis   • Type 2 diabetes mellitus without complication, without long-term current use of insulin ([facility])   • Mixed hyperlipidemia   • Essential hypertension   • Coronary artery disease of bypass graft of native heart with stable angina pectoris ([facility])   • S/P CABG x 3   • Benign prostatic hyperplasia with urinary hesitancy   • Vitamin D deficiency   • Presbycusis of both ears   • Diabetic retinopathy associated with type 2 diabetes mellitus ([facility])        Past Medical History:   Diagnosis Date   • Kidney stone    • Myocardial infarction ([facility])         Past Surgical History:   Procedure Laterality Date   • CATARACT EXTRACTION, BILATERAL Bilateral    • CORONARY ARTERY BYPASS GRAFT      3 vessel   • EXTRACORPOREAL SHOCK WAVE LITHOTRIPSY (ESWL) Bilateral    • TONSILLECTOMY                          PT Assessment/Plan     Row Name 22 0900          PT Assessment    Assessment Comments Pt reports relatively low pain levels overall and is approaching IND condition management as he reports relatively low pain levels. COntinued manual therapy and progressed resistance during ER/HA/AR press with good tolerance with frequent cues for technique. He has difficulty performing mini squats with appropriate technique therefore did not add to HEP. He reports understanding of current program, changed ER/HA to standing.  -RS            PT Plan    PT Plan Comments Follow up regarding remaining functional limitations remaining prior to DC  -RS           User Key  (r) =  Recorded By, (t) = Taken By, (c) = Cosigned By    Initials Name Provider Type    RS Susan Stover, PT Physical Therapist                   OP Exercises     Row Name 04/21/22 0900             Subjective Comments    Subjective Comments The pain is low today, doing well overall  -RS              Subjective Pain    Able to rate subjective pain? yes  -RS              Total Minutes    98625 - PT Therapeutic Exercise Minutes 29  -RS      51735 - PT Manual Therapy Minutes 11  -RS              Exercise 1    Exercise Name 1 NuStep  -RS      Cueing 1 Verbal;Demo  -RS      Time 1 5 min  -RS              Exercise 3    Exercise Name 3 supine alt UE flex over noodle  -RS      Cueing 3 Verbal;Demo  -RS      Reps 3 10ea  -RS              Exercise 4    Exercise Name 4 HA at wall  -RS      Cueing 4 Verbal;Demo  -RS      Sets 4 2  -RS      Reps 4 10  -RS      Time 4 GTB  -RS      Additional Comments noodle behind back  -RS              Exercise 5    Exercise Name 5 ER at wall  -RS      Cueing 5 Verbal;Demo  -RS      Sets 5 2  -RS      Reps 5 10  -RS      Time 5 GTB  -RS      Additional Comments noodle behind back  -RS              Exercise 6    Exercise Name 6 S/L open book  -RS      Cueing 6 Verbal;Demo  -RS      Reps 6 5  -RS      Time 6 10s  -RS              Exercise 8    Exercise Name 8 rows/shoulder ext  -RS      Cueing 8 Verbal;Demo  -RS      Sets 8 2ea  -RS      Reps 8 10ea  -RS      Time 8 GTB  -RS              Exercise 9    Exercise Name 9 lat pull down  -RS      Time 9 --  -RS              Exercise 11    Exercise Name 11 AR press  -RS      Cueing 11 Verbal;Demo  -RS      Reps 11 15, each direction  -RS      Time 11 RTB  -RS              Exercise 12    Exercise Name 12 mini squats  -RS      Cueing 12 Verbal;Demo  -RS      Reps 12 15  -RS      Time 12 Cueing for proper positioning, technique  -RS              Exercise 13    Exercise Name 13 Sidestepping  -RS      Reps 13 2 laps  -RS            User Key  (r) = Recorded By,  (t) = Taken By, (c) = Cosigned By    Initials Name Provider Type    RS Susan Stover, PT Physical Therapist                         Manual Rx (last 36 hours)     Manual Treatments     Row Name 04/21/22 0900             Total Minutes    02500 - PT Manual Therapy Minutes 11  -RS              Manual Rx 1    Manual Rx 1 Location PA mobs T spine (plus T/L) pt prone, grade II/III  -RS      Manual Rx 1 Grade prone stretch of B hip flexor /quad tissues.  -RS            User Key  (r) = Recorded By, (t) = Taken By, (c) = Cosigned By    Initials Name Provider Type    RS Susan Stover, PT Physical Therapist                 PT OP Goals     Row Name 04/21/22 0900          PT Short Term Goals    STG Date to Achieve 04/22/22  -RS     STG 1 pt. to be I with initial HEP to facilitate self management of their condition  -RS     STG 1 Progress Met  -RS     STG 2 pt. to be educated in/verbalize understanding of the importance of posture/ergonomics in association with their condition to facilitate self management of their condition  -RS     STG 2 Progress Met  -RS     STG 3 pt to demonstrate/report being able to stand >/=20 min without T spine pain to facillitae ease with performing household//community ativities  -RS     STG 3 Progress Met  -RS            Long Term Goals    LTG Date to Achieve 06/17/22  -RS     LTG 1 pt. to be I with advanced HEP to facilitate self management of their condition  -RS     LTG 1 Progress Ongoing  -RS     LTG 2 pt. to report an Oswestry </= 20%   to demonstrate decreased level of perceived disability  -RS     LTG 2 Progress Ongoing  -RS     LTG 3 pt to demonstrate/report being able to stand >/=40 min without T spine pain to facillitae ease with performing household//community ativities  -RS     LTG 3 Progress Ongoing  -RS     LTG 4 pt to report/demonstrate ability to walk >/= 2 miles without T/L spine pain to facilitate ease with community/fitness activitis  -RS     LTG 4 Progress Ongoing  -RS            User Key  (r) = Recorded By, (t) = Taken By, (c) = Cosigned By    Initials Name Provider Type    RS Susan Stover, PT Physical Therapist                Therapy Education  Given: HEP  Program: Reinforced, Progressed  How Provided: Verbal, Written, Demonstration  Provided to: Patient  Level of Understanding: Verbalized, Demonstrated              Time Calculation:   Start Time: 0917  Stop Time: 0958  Time Calculation (min): 41 min  Timed Charges  17707 - PT Therapeutic Exercise Minutes: 29  47706 - PT Manual Therapy Minutes: 11  Total Minutes  Timed Charges Total Minutes: 40   Total Minutes: 40  Therapy Charges for Today     Code Description Service Date Service Provider Modifiers Qty    58097214564  PT THER PROC EA 15 MIN 4/21/2022 Susan Stover, PT GP 2    [id number]  PT MANUAL THERAPY EA 15 MIN 4/21/2022 Susan Stover, PT GP 1                    Susan Stover PT  4/21/2022

## 2022-04-26 ENCOUNTER — HOSPITAL ENCOUNTER (OUTPATIENT)
Dept: PHYSICAL THERAPY | Facility: HOSPITAL | Age: 77
Setting detail: THERAPIES SERIES
Discharge: HOME | End: 2022-04-26

## 2022-04-26 DIAGNOSIS — Z74.09 IMPAIRED MOBILITY: ICD-10-CM

## 2022-04-26 DIAGNOSIS — M54.50 LOW BACK PAIN, UNSPECIFIED BACK PAIN LATERALITY, UNSPECIFIED CHRONICITY, UNSPECIFIED WHETHER SCIATICA PRESENT: Primary | ICD-10-CM

## 2022-04-26 DIAGNOSIS — M54.9 MID BACK PAIN: ICD-10-CM

## 2022-04-26 PROCEDURE — 97110 THERAPEUTIC EXERCISES: CPT

## 2022-04-26 PROCEDURE — 97140 MANUAL THERAPY 1/> REGIONS: CPT

## 2022-04-26 NOTE — THERAPY PROGRESS REPORT/RE-CERT
Outpatient Physical Therapy Ortho Progress Note  [facility]     Patient Name: Kerrie Tyler  : 1945  MRN: [id number]  Today's Date: 2022      Visit Date: 2022    Visit Dx:    ICD-10-CM ICD-9-CM   1. Low back pain, unspecified back pain laterality, unspecified chronicity, unspecified whether sciatica present  M54.50 724.2   2. Impaired mobility  Z74.09 799.89   3. Mid back pain  M54.9 724.5       Patient Active Problem List   Diagnosis   • Type 2 diabetes mellitus without complication, without long-term current use of insulin ([facility])   • Mixed hyperlipidemia   • Essential hypertension   • Coronary artery disease of bypass graft of native heart with stable angina pectoris ([facility])   • S/P CABG x 3   • Benign prostatic hyperplasia with urinary hesitancy   • Vitamin D deficiency   • Presbycusis of both ears   • Diabetic retinopathy associated with type 2 diabetes mellitus ([facility])        Past Medical History:   Diagnosis Date   • Kidney stone    • Myocardial infarction ([facility])         Past Surgical History:   Procedure Laterality Date   • CATARACT EXTRACTION, BILATERAL Bilateral    • CORONARY ARTERY BYPASS GRAFT      3 vessel   • EXTRACORPOREAL SHOCK WAVE LITHOTRIPSY (ESWL) Bilateral    • TONSILLECTOMY                          PT Assessment/Plan     Row Name 22 1000          PT Assessment    Assessment Comments Mr. Tyler has been seen by PT for 9 sessions focused on upper/low back pain and treatment has included manual therapy, therex, theract, pt education and HEP. He has met 3/3 STG and  met 3/4 LTG. He reports improvement in tolerance for prolonged walking to 3 miles and standing for 30 min without increased pain. He tolerates progression of side steps with weight and open book at wall well with no reports of increased discomfort. He reports good understanding of current HEP ad is agreeable with plan to DC at next session.  -RS     Please refer to paper survey for additional  self-reported information Yes  -RS     Patient/caregiver participated in establishment of treatment plan and goals Yes  -RS     Patient would benefit from skilled therapy intervention Yes  -RS            PT Plan    PT Plan Comments DC to HEP next session  -RS           User Key  (r) = Recorded By, (t) = Taken By, (c) = Cosigned By    Initials Name Provider Type    RS Susan Stover, PT Physical Therapist                   OP Exercises     Row Name 04/26/22 0900             Subjective Comments    Subjective Comments No pain today, able to walk 3 mile  -RS              Subjective Pain    Able to rate subjective pain? yes  -RS              Total Minutes    41879 - PT Therapeutic Exercise Minutes 30  -RS      36600 - PT Manual Therapy Minutes 12  -RS              Exercise 1    Exercise Name 1 NuStep  -RS      Cueing 1 Verbal;Demo  -RS      Time 1 5 min  -RS              Exercise 3    Exercise Name 3 open book facing wall  -RS      Cueing 3 Verbal;Demo  -RS      Reps 3 10ea  -RS              Exercise 4    Exercise Name 4 HA at wall  -RS      Cueing 4 Verbal;Demo  -RS      Sets 4 2  -RS      Reps 4 10  -RS      Time 4 GTB  -RS      Additional Comments noodle behind back  -RS              Exercise 5    Exercise Name 5 ER at wall  -RS      Cueing 5 Verbal;Demo  -RS      Sets 5 2  -RS      Reps 5 10  -RS      Time 5 GTB  -RS      Additional Comments noodle behind back  -RS              Exercise 6    Exercise Name 6 --  -RS      Cueing 6 --  -RS      Reps 6 --  -RS      Time 6 --  -RS              Exercise 8    Exercise Name 8 rows/shoulder ext  -RS      Cueing 8 Verbal;Demo  -RS      Sets 8 2ea  -RS      Reps 8 10ea  -RS      Time 8 GTB  -RS              Exercise 9    Exercise Name 9 lat pull down  -RS      Sets 9 2  -RS      Reps 9 10  -RS      Time 9 50#  -RS              Exercise 11    Exercise Name 11 AR press  -RS      Cueing 11 Verbal;Demo  -RS      Reps 11 15, each direction  -RS      Time 11 GTB  -RS               Exercise 12    Exercise Name 12 --  -RS      Cueing 12 --  -RS      Reps 12 --  -RS      Time 12 --  -RS              Exercise 13    Exercise Name 13 Sidestepping  -RS      Reps 13 3 laps  -RS      Time 13 GTB L2 med ball chest press  -RS            User Key  (r) = Recorded By, (t) = Taken By, (c) = Cosigned By    Initials Name Provider Type    RS Susan Stover, PT Physical Therapist                         Manual Rx (last 36 hours)     Manual Treatments     Row Name 04/26/22 0900             Total Minutes    98022 - PT Manual Therapy Minutes 12  -RS              Manual Rx 1    Manual Rx 1 Location PA mobs T spine (plus T/L) pt prone, grade II/III  -RS      Manual Rx 1 Grade prone stretch of B hip flexor /quad tissues.  -RS            User Key  (r) = Recorded By, (t) = Taken By, (c) = Cosigned By    Initials Name Provider Type    RS Susan Stover, PT Physical Therapist                 PT OP Goals     Row Name 04/26/22 0900          PT Short Term Goals    STG Date to Achieve 04/22/22  -RS     STG 1 pt. to be I with initial HEP to facilitate self management of their condition  -RS     STG 1 Progress Met  -RS     STG 2 pt. to be educated in/verbalize understanding of the importance of posture/ergonomics in association with their condition to facilitate self management of their condition  -RS     STG 2 Progress Met  -RS     STG 3 pt to demonstrate/report being able to stand >/=20 min without T spine pain to facillitae ease with performing household//community ativities  -RS     STG 3 Progress Met  -RS            Long Term Goals    LTG Date to Achieve 06/17/22  -RS     LTG 1 pt. to be I with advanced HEP to facilitate self management of their condition  -RS     LTG 1 Progress Met  -RS     LTG 2 pt. to report an Oswestry </= 20%   to demonstrate decreased level of perceived disability  -RS     LTG 2 Progress Met  -RS     LTG 2 Progress Comments 10%  -RS     LTG 3 pt to demonstrate/report being able to stand >/=40  min without T spine pain to facillitae ease with performing household//community ativities  -RS     LTG 3 Progress Progressing  -RS     LTG 3 Progress Comments 30 min  -RS     LTG 4 pt to report/demonstrate ability to walk >/= 2 miles without T/L spine pain to facilitate ease with community/fitness activitis  -RS     LTG 4 Progress Met  -RS           User Key  (r) = Recorded By, (t) = Taken By, (c) = Cosigned By    Initials Name Provider Type    RS Susan Stover PT Physical Therapist                     Outcome Measure Options: Modified Oswestry  Modified Oswestry  Modified Oswestry Score/Comments: 10%      Time Calculation:   Start Time: 0917  Stop Time: 1000  Time Calculation (min): 43 min  Timed Charges  99699 - PT Therapeutic Exercise Minutes: 30  23406 - PT Manual Therapy Minutes: 12  Total Minutes  Timed Charges Total Minutes: 42   Total Minutes: 42  Therapy Charges for Today     Code Description Service Date Service Provider Modifiers Qty    12469965498 HC PT THER PROC EA 15 MIN 4/26/2022 Susan Stover, PT GP 2    92818230151 HC PT MANUAL THERAPY EA 15 MIN 4/26/2022 Susan Stover, PT GP 1          PT G-Codes  Outcome Measure Options: Modified Oswestry  Modified Oswestry Score/Comments: 10%         Susan Stover PT  4/26/2022

## 2022-04-28 ENCOUNTER — HOSPITAL ENCOUNTER (OUTPATIENT)
Dept: PHYSICAL THERAPY | Facility: HOSPITAL | Age: 77
Setting detail: THERAPIES SERIES
Discharge: HOME | End: 2022-04-28

## 2022-04-28 DIAGNOSIS — M54.50 LOW BACK PAIN, UNSPECIFIED BACK PAIN LATERALITY, UNSPECIFIED CHRONICITY, UNSPECIFIED WHETHER SCIATICA PRESENT: Primary | ICD-10-CM

## 2022-04-28 DIAGNOSIS — M54.9 MID BACK PAIN: ICD-10-CM

## 2022-04-28 DIAGNOSIS — Z74.09 IMPAIRED MOBILITY: ICD-10-CM

## 2022-04-28 PROCEDURE — 97110 THERAPEUTIC EXERCISES: CPT

## 2022-04-28 PROCEDURE — 97140 MANUAL THERAPY 1/> REGIONS: CPT

## 2022-04-28 NOTE — THERAPY DISCHARGE NOTE
Outpatient Physical Therapy Ortho Treatment Note/Discharge Summary  [facility]     Patient Name: Kerrie Tyler  : 1945  MRN: [id number]  Today's Date: 2022      Visit Date: 2022    Visit Dx:    ICD-10-CM ICD-9-CM   1. Low back pain, unspecified back pain laterality, unspecified chronicity, unspecified whether sciatica present  M54.50 724.2   2. Impaired mobility  Z74.09 799.89   3. Mid back pain  M54.9 724.5       Patient Active Problem List   Diagnosis   • Type 2 diabetes mellitus without complication, without long-term current use of insulin ([facility])   • Mixed hyperlipidemia   • Essential hypertension   • Coronary artery disease of bypass graft of native heart with stable angina pectoris ([facility])   • S/P CABG x 3   • Benign prostatic hyperplasia with urinary hesitancy   • Vitamin D deficiency   • Presbycusis of both ears   • Diabetic retinopathy associated with type 2 diabetes mellitus ([facility])        Past Medical History:   Diagnosis Date   • Kidney stone    • Myocardial infarction ([facility])         Past Surgical History:   Procedure Laterality Date   • CATARACT EXTRACTION, BILATERAL Bilateral    • CORONARY ARTERY BYPASS GRAFT      3 vessel   • EXTRACORPOREAL SHOCK WAVE LITHOTRIPSY (ESWL) Bilateral    • TONSILLECTOMY                          PT Assessment/Plan     Row Name 22 1000          PT Assessment    Assessment Comments Mr. Tyler has been seen by PT for 10 sessions focused on upper/low back pain and treatment has included manual therapy, therex, theract, pt education and HEP. He has met 3/3 STG and  met or partially met 4/4 LTG. He reports improvement in tolerance for prolonged walking to 3 miles and standing for 30 min without increased pain. He reports good understanding of HEP and provided updated copy in clinic. He is back to 100% of PLOF per pt report and appropriate for ad agreeable to DC this date.  -RS            PT Plan    PT Plan Comments DC to HEP  -RS            User Key  (r) = Recorded By, (t) = Taken By, (c) = Cosigned By    Initials Name Provider Type    RS Susan Stover PT Physical Therapist                     OP Exercises     Row Name 04/28/22 0900             Subjective Comments    Subjective Comments Pt reports he is abck to PLOF  -RS              Subjective Pain    Able to rate subjective pain? yes  -RS      Pre-Treatment Pain Level 0  -RS              Total Minutes    54422 - PT Therapeutic Exercise Minutes 26  -RS      63913 - PT Manual Therapy Minutes 14  -RS              Exercise 1    Exercise Name 1 NuStep  -RS      Cueing 1 Verbal;Demo  -RS      Time 1 5 min  -RS              Exercise 2    Exercise Name 2 review of goals and POC  -RS              Exercise 3    Exercise Name 3 open book facing wall  -RS      Cueing 3 Verbal;Demo  -RS      Reps 3 10ea  -RS              Exercise 4    Exercise Name 4 HA at wall  -RS      Cueing 4 Verbal;Demo  -RS      Sets 4 2  -RS      Reps 4 10  -RS      Time 4 GTB  -RS      Additional Comments noodle behind back  -RS              Exercise 5    Exercise Name 5 ER at wall  -RS      Cueing 5 Verbal;Demo  -RS      Sets 5 2  -RS      Reps 5 10  -RS      Time 5 GTB  -RS      Additional Comments noodle behind back  -RS              Exercise 8    Exercise Name 8 rows/shoulder ext  -RS      Cueing 8 Verbal;Demo  -RS      Sets 8 2ea  -RS      Reps 8 10ea  -RS      Time 8 GTB  -RS              Exercise 9    Exercise Name 9 --  -RS      Sets 9 --  -RS      Reps 9 --  -RS      Time 9 --  -RS              Exercise 11    Exercise Name 11 AR press  -RS      Cueing 11 Verbal;Demo  -RS      Reps 11 15, each direction  -RS      Time 11 GTB  -RS              Exercise 13    Exercise Name 13 Sidestepping  -RS      Reps 13 3 laps  -RS      Time 13 GTB L2 med ball chest press  -RS            User Key  (r) = Recorded By, (t) = Taken By, (c) = Cosigned By    Initials Name Provider Type    RS Susan Stover PT Physical Therapist                            Manual Rx (last 36 hours)     Manual Treatments     Row Name 04/28/22 0900             Total Minutes    29679 - PT Manual Therapy Minutes 14  -RS              Manual Rx 1    Manual Rx 1 Location PA mobs T spine (plus T/L) pt prone, grade II/III  -RS      Manual Rx 1 Grade prone stretch of B hip flexor /quad tissues.  -RS            User Key  (r) = Recorded By, (t) = Taken By, (c) = Cosigned By    Initials Name Provider Type    RS Susan Stover, PT Physical Therapist                 PT OP Goals     Row Name 04/28/22 0900          PT Short Term Goals    STG Date to Achieve 04/22/22  -RS     STG 1 pt. to be I with initial HEP to facilitate self management of their condition  -RS     STG 1 Progress Met  -RS     STG 2 pt. to be educated in/verbalize understanding of the importance of posture/ergonomics in association with their condition to facilitate self management of their condition  -RS     STG 2 Progress Met  -RS     STG 3 pt to demonstrate/report being able to stand >/=20 min without T spine pain to facillitae ease with performing household//community ativities  -RS     STG 3 Progress Met  -RS            Long Term Goals    LTG Date to Achieve 06/17/22  -RS     LTG 1 pt. to be I with advanced HEP to facilitate self management of their condition  -RS     LTG 1 Progress Met  -RS     LTG 2 pt. to report an Oswestry </= 20%   to demonstrate decreased level of perceived disability  -RS     LTG 2 Progress Met  -RS     LTG 3 pt to demonstrate/report being able to stand >/=40 min without T spine pain to facillitae ease with performing household//community ativities  -RS     LTG 3 Progress Partially Met  -RS     LTG 4 pt to report/demonstrate ability to walk >/= 2 miles without T/L spine pain to facilitate ease with community/fitness activitis  -RS     LTG 4 Progress Met  -RS           User Key  (r) = Recorded By, (t) = Taken By, (c) = Cosigned By    Initials Name Provider Type    Susan Dickerson  PT Physical Therapist                Therapy Education  Education Details: DC plan, updated HEP  Program: Reinforced, Modified  How Provided: Verbal, Demonstration, Written  Provided to: Patient  Level of Understanding: Verbalized, Demonstrated              Time Calculation:   Start Time: 0915  Stop Time: 0956  Time Calculation (min): 41 min  Timed Charges  07059 - PT Therapeutic Exercise Minutes: 26  71456 - PT Manual Therapy Minutes: 14  Total Minutes  Timed Charges Total Minutes: 40   Total Minutes: 40  Therapy Charges for Today     Code Description Service Date Service Provider Modifiers Qty    48626095087 HC PT THER PROC EA 15 MIN 4/28/2022 Susan Stover, PT GP 2    17173403703 HC PT MANUAL THERAPY EA 15 MIN 4/28/2022 Susan Stover, PT GP 1                OP PT Discharge Summary  Date of Discharge: 04/28/22  Reason for Discharge: All goals achieved  Outcomes Achieved: Patient able to partially acheive established goals  Discharge Destination: Home with home program  Discharge Instructions/Additional Comments: Mr. Tyler has been seen by PT for 10 sessions focused on upper/low back pain and treatment has included manual therapy, therex, theract, pt education and HEP. He has met 3/3 STG and  met or partially met 4/4 LTG. He reports improvement in tolerance for prolonged walking to 3 miles and standing for 30 min without increased pain. He reports good understanding of HEP and provided updated copy in clinic. He is back to 100% of PLOF per pt report and appropriate for ad agreeable to DC this date.      Susan Stover, PT  4/28/2022

## 2022-06-01 ENCOUNTER — TELEPHONE (OUTPATIENT)
Dept: FAMILY MEDICINE CLINIC | Facility: CLINIC | Age: 77
End: 2022-06-01

## 2022-06-01 RX ORDER — NITROGLYCERIN 0.4 MG/1
0.4 TABLET SUBLINGUAL
Qty: 25 TABLET | Refills: 12 | Status: SHIPPED | OUTPATIENT
Start: 2022-06-01

## 2022-06-01 NOTE — TELEPHONE ENCOUNTER
Caller: EDDIE HINTON    Relationship: Emergency Contact    Best call back number: [phone]    Requested Prescriptions: NITRO GLYCERIN .4 MG   Requested Prescriptions      No prescriptions requested or ordered in this encounter        Pharmacy where request should be sent: [id number] [address] AT [facility] [address] - [phone]  - [phone] FX     Additional details provided by patient: SON STATES DR ARMENDARIZ PRESCRIBES .4 MG NITRO GLYCERIN TABLETS FOR PATIENT. HUB DID NOT SEE THIS MEDICATION LISTED. PATIENT ONLY HAS 2 PILLS LEFT     Does the patient have less than a 3 day supply:  [x] Yes  [] No    Ac Prieto Rep   06/01/22 14:25 EDT

## 2022-07-29 ENCOUNTER — IMMUNIZATION (OUTPATIENT)
Dept: VACCINE CLINIC | Facility: HOSPITAL | Age: 77
End: 2022-07-29

## 2022-07-29 DIAGNOSIS — Z23 NEED FOR VACCINATION: Primary | ICD-10-CM

## 2022-07-29 PROCEDURE — 91305 HC SARSCOV2 VAC 30 MCG TRS-SUCR PFIZER: CPT | Performed by: INTERNAL MEDICINE

## 2022-07-29 PROCEDURE — 0054A HC ADM SARSCV2 30MCG TRS-SUCR BOOSTER: CPT | Performed by: INTERNAL MEDICINE

## 2022-08-23 ENCOUNTER — HOSPITAL ENCOUNTER (INPATIENT)
Facility: HOSPITAL | Age: 77
LOS: 1 days | Discharge: HOME | End: 2022-08-25
Attending: EMERGENCY MEDICINE | Admitting: INTERNAL MEDICINE

## 2022-08-23 ENCOUNTER — APPOINTMENT (OUTPATIENT)
Dept: GENERAL RADIOLOGY | Facility: HOSPITAL | Age: 77
End: 2022-08-23

## 2022-08-23 DIAGNOSIS — I10 HYPERTENSION, UNSPECIFIED TYPE: ICD-10-CM

## 2022-08-23 DIAGNOSIS — Z86.79 HISTORY OF CORONARY ARTERY DISEASE: ICD-10-CM

## 2022-08-23 DIAGNOSIS — R07.9 ACUTE CHEST PAIN: Primary | ICD-10-CM

## 2022-08-23 DIAGNOSIS — I25.708 CORONARY ARTERY DISEASE OF BYPASS GRAFT OF NATIVE HEART WITH STABLE ANGINA PECTORIS: ICD-10-CM

## 2022-08-23 DIAGNOSIS — R73.9 HYPERGLYCEMIA: ICD-10-CM

## 2022-08-23 DIAGNOSIS — I10 ESSENTIAL HYPERTENSION: ICD-10-CM

## 2022-08-23 DIAGNOSIS — R91.1 PULMONARY NODULE: ICD-10-CM

## 2022-08-23 LAB
ALBUMIN SERPL-MCNC: 4.5 G/DL (ref 3.5–5.2)
ALBUMIN/GLOB SERPL: 1.5 G/DL
ALP SERPL-CCNC: 64 U/L (ref 39–117)
ALT SERPL W P-5'-P-CCNC: 17 U/L (ref 1–41)
ANION GAP SERPL CALCULATED.3IONS-SCNC: 11.7 MMOL/L (ref 5–15)
APTT PPP: 32.1 SECONDS (ref 22.7–35.4)
AST SERPL-CCNC: 21 U/L (ref 1–40)
BASOPHILS # BLD AUTO: 0.04 10*3/MM3 (ref 0–0.2)
BASOPHILS NFR BLD AUTO: 0.5 % (ref 0–1.5)
BILIRUB SERPL-MCNC: 0.5 MG/DL (ref 0–1.2)
BUN SERPL-MCNC: 13 MG/DL (ref 8–23)
BUN/CREAT SERPL: 11 (ref 7–25)
CALCIUM SPEC-SCNC: 9.6 MG/DL (ref 8.6–10.5)
CHLORIDE SERPL-SCNC: 101 MMOL/L (ref 98–107)
CO2 SERPL-SCNC: 25.3 MMOL/L (ref 22–29)
CREAT SERPL-MCNC: 1.18 MG/DL (ref 0.76–1.27)
DEPRECATED RDW RBC AUTO: 42.6 FL (ref 37–54)
EGFRCR SERPLBLD CKD-EPI 2021: 63.2 ML/MIN/1.73
EOSINOPHIL # BLD AUTO: 0.09 10*3/MM3 (ref 0–0.4)
EOSINOPHIL NFR BLD AUTO: 1.1 % (ref 0.3–6.2)
ERYTHROCYTE [DISTWIDTH] IN BLOOD BY AUTOMATED COUNT: 13.6 % (ref 12.3–15.4)
GLOBULIN UR ELPH-MCNC: 3 GM/DL
GLUCOSE SERPL-MCNC: 114 MG/DL (ref 65–99)
HCT VFR BLD AUTO: 42.3 % (ref 37.5–51)
HGB BLD-MCNC: 14.1 G/DL (ref 13–17.7)
HOLD SPECIMEN: NORMAL
HOLD SPECIMEN: NORMAL
IMM GRANULOCYTES # BLD AUTO: 0.04 10*3/MM3 (ref 0–0.05)
IMM GRANULOCYTES NFR BLD AUTO: 0.5 % (ref 0–0.5)
INR PPP: 1.12 (ref 0.9–1.1)
LYMPHOCYTES # BLD AUTO: 2.08 10*3/MM3 (ref 0.7–3.1)
LYMPHOCYTES NFR BLD AUTO: 24.9 % (ref 19.6–45.3)
MAGNESIUM SERPL-MCNC: 1.9 MG/DL (ref 1.6–2.4)
MCH RBC QN AUTO: 29 PG (ref 26.6–33)
MCHC RBC AUTO-ENTMCNC: 33.3 G/DL (ref 31.5–35.7)
MCV RBC AUTO: 87 FL (ref 79–97)
MONOCYTES # BLD AUTO: 0.4 10*3/MM3 (ref 0.1–0.9)
MONOCYTES NFR BLD AUTO: 4.8 % (ref 5–12)
NEUTROPHILS NFR BLD AUTO: 5.71 10*3/MM3 (ref 1.7–7)
NEUTROPHILS NFR BLD AUTO: 68.2 % (ref 42.7–76)
NRBC BLD AUTO-RTO: 0 /100 WBC (ref 0–0.2)
NT-PROBNP SERPL-MCNC: 207 PG/ML (ref 0–1800)
PLATELET # BLD AUTO: 168 10*3/MM3 (ref 140–450)
PMV BLD AUTO: 10.2 FL (ref 6–12)
POTASSIUM SERPL-SCNC: 4.4 MMOL/L (ref 3.5–5.2)
PROT SERPL-MCNC: 7.5 G/DL (ref 6–8.5)
PROTHROMBIN TIME: 14.3 SECONDS (ref 11.7–14.2)
QT INTERVAL: 380 MS
RBC # BLD AUTO: 4.86 10*6/MM3 (ref 4.14–5.8)
SARS-COV-2 RNA RESP QL NAA+PROBE: NOT DETECTED
SODIUM SERPL-SCNC: 138 MMOL/L (ref 136–145)
TROPONIN T SERPL-MCNC: <0.01 NG/ML (ref 0–0.03)
TROPONIN T SERPL-MCNC: <0.01 NG/ML (ref 0–0.03)
WBC NRBC COR # BLD: 8.36 10*3/MM3 (ref 3.4–10.8)
WHOLE BLOOD HOLD COAG: NORMAL
WHOLE BLOOD HOLD SPECIMEN: NORMAL

## 2022-08-23 PROCEDURE — 93010 ELECTROCARDIOGRAM REPORT: CPT | Performed by: INTERNAL MEDICINE

## 2022-08-23 PROCEDURE — 83735 ASSAY OF MAGNESIUM: CPT | Performed by: EMERGENCY MEDICINE

## 2022-08-23 PROCEDURE — 71046 X-RAY EXAM CHEST 2 VIEWS: CPT

## 2022-08-23 PROCEDURE — U0005 INFEC AGEN DETEC AMPLI PROBE: HCPCS | Performed by: EMERGENCY MEDICINE

## 2022-08-23 PROCEDURE — 84484 ASSAY OF TROPONIN QUANT: CPT

## 2022-08-23 PROCEDURE — 85025 COMPLETE CBC W/AUTO DIFF WBC: CPT

## 2022-08-23 PROCEDURE — 85730 THROMBOPLASTIN TIME PARTIAL: CPT | Performed by: EMERGENCY MEDICINE

## 2022-08-23 PROCEDURE — U0003 INFECTIOUS AGENT DETECTION BY NUCLEIC ACID (DNA OR RNA); SEVERE ACUTE RESPIRATORY SYNDROME CORONAVIRUS 2 (SARS-COV-2) (CORONAVIRUS DISEASE [COVID-19]), AMPLIFIED PROBE TECHNIQUE, MAKING USE OF HIGH THROUGHPUT TECHNOLOGIES AS DESCRIBED BY CMS-2020-01-R: HCPCS | Performed by: EMERGENCY MEDICINE

## 2022-08-23 PROCEDURE — G0378 HOSPITAL OBSERVATION PER HR: HCPCS

## 2022-08-23 PROCEDURE — 83880 ASSAY OF NATRIURETIC PEPTIDE: CPT | Performed by: EMERGENCY MEDICINE

## 2022-08-23 PROCEDURE — 36415 COLL VENOUS BLD VENIPUNCTURE: CPT

## 2022-08-23 PROCEDURE — 93005 ELECTROCARDIOGRAM TRACING: CPT | Performed by: EMERGENCY MEDICINE

## 2022-08-23 PROCEDURE — 80053 COMPREHEN METABOLIC PANEL: CPT

## 2022-08-23 PROCEDURE — 84484 ASSAY OF TROPONIN QUANT: CPT | Performed by: EMERGENCY MEDICINE

## 2022-08-23 PROCEDURE — 93005 ELECTROCARDIOGRAM TRACING: CPT

## 2022-08-23 PROCEDURE — 85610 PROTHROMBIN TIME: CPT | Performed by: EMERGENCY MEDICINE

## 2022-08-23 PROCEDURE — 99284 EMERGENCY DEPT VISIT MOD MDM: CPT

## 2022-08-23 RX ORDER — NICOTINE POLACRILEX 4 MG
15 LOZENGE BUCCAL
Status: DISCONTINUED | OUTPATIENT
Start: 2022-08-23 | End: 2022-08-25 | Stop reason: HOSPADM

## 2022-08-23 RX ORDER — DEXTROSE MONOHYDRATE 25 G/50ML
25 INJECTION, SOLUTION INTRAVENOUS
Status: DISCONTINUED | OUTPATIENT
Start: 2022-08-23 | End: 2022-08-25 | Stop reason: HOSPADM

## 2022-08-23 RX ORDER — NITROGLYCERIN 0.4 MG/1
0.4 TABLET SUBLINGUAL
Status: DISCONTINUED | OUTPATIENT
Start: 2022-08-23 | End: 2022-08-25 | Stop reason: HOSPADM

## 2022-08-23 RX ORDER — ROSUVASTATIN CALCIUM 20 MG/1
20 TABLET, COATED ORAL DAILY
Status: DISCONTINUED | OUTPATIENT
Start: 2022-08-24 | End: 2022-08-23

## 2022-08-23 RX ORDER — INSULIN LISPRO 100 [IU]/ML
0-9 INJECTION, SOLUTION INTRAVENOUS; SUBCUTANEOUS
Status: DISCONTINUED | OUTPATIENT
Start: 2022-08-24 | End: 2022-08-25 | Stop reason: HOSPADM

## 2022-08-23 RX ORDER — TAMSULOSIN HYDROCHLORIDE 0.4 MG/1
0.4 CAPSULE ORAL DAILY
Status: DISCONTINUED | OUTPATIENT
Start: 2022-08-23 | End: 2022-08-25 | Stop reason: HOSPADM

## 2022-08-23 RX ORDER — TAMSULOSIN HYDROCHLORIDE 0.4 MG/1
0.4 CAPSULE ORAL DAILY
Status: DISCONTINUED | OUTPATIENT
Start: 2022-08-24 | End: 2022-08-23

## 2022-08-23 RX ORDER — SODIUM CHLORIDE 0.9 % (FLUSH) 0.9 %
10 SYRINGE (ML) INJECTION AS NEEDED
Status: DISCONTINUED | OUTPATIENT
Start: 2022-08-23 | End: 2022-08-25 | Stop reason: HOSPADM

## 2022-08-23 RX ORDER — LISINOPRIL 40 MG/1
40 TABLET ORAL
Refills: 1 | Status: DISCONTINUED | OUTPATIENT
Start: 2022-08-24 | End: 2022-08-25 | Stop reason: HOSPADM

## 2022-08-23 RX ORDER — ASPIRIN 81 MG/1
162 TABLET, CHEWABLE ORAL ONCE
Status: COMPLETED | OUTPATIENT
Start: 2022-08-23 | End: 2022-08-23

## 2022-08-23 RX ORDER — ROSUVASTATIN CALCIUM 20 MG/1
20 TABLET, COATED ORAL DAILY
Status: DISCONTINUED | OUTPATIENT
Start: 2022-08-23 | End: 2022-08-24 | Stop reason: SDUPTHER

## 2022-08-23 RX ADMIN — ROSUVASTATIN CALCIUM 20 MG: 20 TABLET, FILM COATED ORAL at 23:01

## 2022-08-23 RX ADMIN — TAMSULOSIN HYDROCHLORIDE 0.4 MG: 0.4 CAPSULE ORAL at 23:00

## 2022-08-23 RX ADMIN — ASPIRIN 162 MG: 81 TABLET, CHEWABLE ORAL at 20:09

## 2022-08-23 NOTE — ED PROVIDER NOTES
EMERGENCY DEPARTMENT ENCOUNTER  I wore full protective equipment throughout this patient encounter including a N95 mask, eye shield, gown and gloves. Hand hygiene was performed before donning protective equipment and after removal when leaving the room.    Room Number:  12/12  Date of encounter:  8/23/2022  PCP: Mayito Sepulveda MD    HPI:  Context: Kerrie Tyler is a 77 y.o. male who presents to the ED c/o chief complaint of chest pain.  Patient complains of intermittent chest pain that began last night.  Chest pain was left-sided, diffuse, described as pressure, radiate to the neck as well as to the left arm.  Patient reports associated shortness of breath, no nausea or vomiting, no diaphoresis.  Patient is unable to say if pain was exertional.  Patient reports that pain was slightly relieved with nitroglycerin.  Patient reports that last episode of chest pain was while he was on his way into the emergency department, ago and relieved with nitroglycerin.  Patient reports he is currently asymptomatic.  Patient reports that prior to onset of chest pain he was at baseline health, denies any cough or upper respiratory symptoms, no abdominal pain, no indigestion, eating and drinking normally.  Patient reports a remote history of prior MI, occurred in 1994.  Patient reports that he was previously followed by cardiology but is not currently followed by cardiology.  Patient reports similar symptoms with previous MI.  Patient reports that he took 81 mg aspirin earlier today.    MEDICAL HISTORY REVIEW  Reviewed in EPIC    PAST MEDICAL HISTORY  Active Ambulatory Problems     Diagnosis Date Noted   • Type 2 diabetes mellitus without complication, without long-term current use of insulin ([facility]) 12/16/2020   • Mixed hyperlipidemia 12/16/2020   • Essential hypertension 12/16/2020   • Coronary artery disease of bypass graft of native heart with stable angina pectoris ([facility]) 12/16/2020   • S/P CABG x 3 12/16/2020   • Benign  prostatic hyperplasia with urinary hesitancy 2021   • Vitamin D deficiency 2021   • Presbycusis of both ears 2021   • Diabetic retinopathy associated with type 2 diabetes mellitus (HCC) 2021     Resolved Ambulatory Problems     Diagnosis Date Noted   • No Resolved Ambulatory Problems     Past Medical History:   Diagnosis Date   • Kidney stone    • Myocardial infarction (HCC)        PAST SURGICAL HISTORY  Past Surgical History:   Procedure Laterality Date   • CATARACT EXTRACTION, BILATERAL Bilateral    • CORONARY ARTERY BYPASS GRAFT      3 vessel   • EXTRACORPOREAL SHOCK WAVE LITHOTRIPSY (ESWL) Bilateral    • TONSILLECTOMY         FAMILY HISTORY  Family History   Problem Relation Age of Onset   • Diabetes Father    • Heart disease Father    • Hypertension Father    • Hyperlipidemia Father    • Diabetes Brother    • Heart disease Brother    • Hypertension Brother    • Hyperlipidemia Brother    • Heart disease Daughter    • Hyperlipidemia Daughter    • Hypertension Daughter    • Colon cancer Neg Hx    • Prostate cancer Neg Hx        SOCIAL HISTORY  Social History     Socioeconomic History   • Marital status:    Tobacco Use   • Smoking status: Former Smoker     Packs/day: 0.50     Years: 12.00     Pack years: 6.00     Quit date:      Years since quittin.6   • Smokeless tobacco: Never Used   Substance and Sexual Activity   • Alcohol use: Never   • Drug use: Never   • Sexual activity: Yes     Partners: Female       ALLERGIES  Sulfa antibiotics    The patient's allergies have been reviewed    REVIEW OF SYSTEMS  All systems reviewed and negative except for those discussed in HPI.     PHYSICAL EXAM  I have reviewed the triage vital signs and nursing notes.  ED Triage Vitals [22 1815]   Temp Heart Rate Resp BP SpO2   98.2 °F (36.8 °C) 80 16 -- 97 %      Temp src Heart Rate Source Patient Position BP Location FiO2 (%)   Tympanic Monitor -- -- --       General: No acute  distress.  HENT: NCAT, PERRL, Nares patent.  Eyes: no scleral icterus.  Neck: trachea midline, no ROM limitations.  CV: regular rhythm, regular rate.  Respiratory: normal effort, CTAB.  Abdomen: soft, nondistended, NTTP, no rebound tenderness, no guarding or rigidity.  Musculoskeletal: no deformity.  Neuro: alert, moves all extremities, follows commands.  Skin: warm, dry.    LAB RESULTS  Recent Results (from the past 24 hour(s))   ECG 12 Lead    Collection Time: 08/23/22  6:30 PM   Result Value Ref Range    QT Interval 380 ms   Comprehensive Metabolic Panel    Collection Time: 08/23/22  6:50 PM    Specimen: Blood   Result Value Ref Range    Glucose 114 (H) 65 - 99 mg/dL    BUN 13 8 - 23 mg/dL    Creatinine 1.18 0.76 - 1.27 mg/dL    Sodium 138 136 - 145 mmol/L    Potassium 4.4 3.5 - 5.2 mmol/L    Chloride 101 98 - 107 mmol/L    CO2 25.3 22.0 - 29.0 mmol/L    Calcium 9.6 8.6 - 10.5 mg/dL    Total Protein 7.5 6.0 - 8.5 g/dL    Albumin 4.50 3.50 - 5.20 g/dL    ALT (SGPT) 17 1 - 41 U/L    AST (SGOT) 21 1 - 40 U/L    Alkaline Phosphatase 64 39 - 117 U/L    Total Bilirubin 0.5 0.0 - 1.2 mg/dL    Globulin 3.0 gm/dL    A/G Ratio 1.5 g/dL    BUN/Creatinine Ratio 11.0 7.0 - 25.0    Anion Gap 11.7 5.0 - 15.0 mmol/L    eGFR 63.2 >60.0 mL/min/1.73   Troponin    Collection Time: 08/23/22  6:50 PM    Specimen: Blood   Result Value Ref Range    Troponin T <0.010 0.000 - 0.030 ng/mL   Green Top (Gel)    Collection Time: 08/23/22  6:50 PM   Result Value Ref Range    Extra Tube Hold for add-ons.    Lavender Top    Collection Time: 08/23/22  6:50 PM   Result Value Ref Range    Extra Tube hold for add-on    Gold Top - SST    Collection Time: 08/23/22  6:50 PM   Result Value Ref Range    Extra Tube Hold for add-ons.    Light Blue Top    Collection Time: 08/23/22  6:50 PM   Result Value Ref Range    Extra Tube Hold for add-ons.    CBC Auto Differential    Collection Time: 08/23/22  6:50 PM    Specimen: Blood   Result Value Ref Range    WBC  8.36 3.40 - 10.80 10*3/mm3    RBC 4.86 4.14 - 5.80 10*6/mm3    Hemoglobin 14.1 13.0 - 17.7 g/dL    Hematocrit 42.3 37.5 - 51.0 %    MCV 87.0 79.0 - 97.0 fL    MCH 29.0 26.6 - 33.0 pg    MCHC 33.3 31.5 - 35.7 g/dL    RDW 13.6 12.3 - 15.4 %    RDW-SD 42.6 37.0 - 54.0 fl    MPV 10.2 6.0 - 12.0 fL    Platelets 168 140 - 450 10*3/mm3    Neutrophil % 68.2 42.7 - 76.0 %    Lymphocyte % 24.9 19.6 - 45.3 %    Monocyte % 4.8 (L) 5.0 - 12.0 %    Eosinophil % 1.1 0.3 - 6.2 %    Basophil % 0.5 0.0 - 1.5 %    Immature Grans % 0.5 0.0 - 0.5 %    Neutrophils, Absolute 5.71 1.70 - 7.00 10*3/mm3    Lymphocytes, Absolute 2.08 0.70 - 3.10 10*3/mm3    Monocytes, Absolute 0.40 0.10 - 0.90 10*3/mm3    Eosinophils, Absolute 0.09 0.00 - 0.40 10*3/mm3    Basophils, Absolute 0.04 0.00 - 0.20 10*3/mm3    Immature Grans, Absolute 0.04 0.00 - 0.05 10*3/mm3    nRBC 0.0 0.0 - 0.2 /100 WBC   Protime-INR    Collection Time: 08/23/22  6:50 PM    Specimen: Blood   Result Value Ref Range    Protime 14.3 (H) 11.7 - 14.2 Seconds    INR 1.12 (H) 0.90 - 1.10   aPTT    Collection Time: 08/23/22  6:50 PM    Specimen: Blood   Result Value Ref Range    PTT 32.1 22.7 - 35.4 seconds       I ordered the above labs and reviewed the results.    RADIOLOGY  XR Chest 2 View    Result Date: 8/23/2022  XR CHEST 2 VW-  HISTORY:  Chest pain, history of CABG x3.  COMPARISON:  None  FINDINGS:  Two views of the chest were obtained.  Support Devices:  None. Cardiac Silhouette/Mediastinum/Livia:  The cardiac silhouette is normal in size. There is at least one coronary artery stent. There is calcific aortic atherosclerosis. Mediastinal and hilar contours are within normal limits. Lungs/Pleural Spaces:  There is no focal consolidation or effusion. There is a somewhat nodular opacity projecting over the lower right lung base on the frontal view, which may reflect a pulmonary nodule and is incompletely assessed. Recommend comparison with prior imaging, if available, otherwise  recommend follow-up CT chest. Chest Wall/Diaphragm/Upper Abdomen:  Median sternotomy wires are intact. There is calcific atherosclerosis in the upper abdomen.  This report was finalized on 8/23/2022 7:12 PM by Dr. Mansi Connolly M.D.        I ordered the above noted radiological studies. I reviewed the images and results. I agree with the radiologist interpretation.    PROCEDURES  Procedures    MEDICATIONS GIVEN IN ER  Medications   sodium chloride 0.9 % flush 10 mL (has no administration in time range)   aspirin chewable tablet 162 mg (has no administration in time range)   nitroglycerin (NITROSTAT) SL tablet 0.4 mg (has no administration in time range)       PROGRESS, DATA ANALYSIS, CONSULTS, AND MEDICAL DECISION MAKING  A complete history and physical exam have been performed.  All available laboratory and imaging results have been reviewed by myself prior to disposition.    MDM  After the initial H&P, I discussed pertinent information from history and physical exam with patient/family.  Discussed differential diagnosis.  Discussed plan for ED evaluation/workup/treatment.  All questions answered.  Patient/family is agreeable with plan.  ED Course as of 08/23/22 2006   Tue Aug 23, 2022   1945 My differential diagnosis for chest pain includes but is not limited to:  Muscle strain, costochondritis, myositis, pleurisy, rib fracture, intercostal neuritis, herpes zoster, tumor, myocardial infarction, coronary syndrome, unstable angina, angina, aortic dissection, mitral valve prolapse, pericarditis, palpitations, pulmonary embolus, pneumonia, pneumothorax, lung cancer, GERD, esophagitis, esophageal spasm     [JG]   1946 EKG independently viewed and contemporaneously interpreted by ED physician. Time: 1830.  Rate 72.  Interpretation: Normal sinus rhythm, left axis deviation, normal QRS, no ST elevation or depression, T wave inversion in leads I and aVL as well as leads V2 through V3. [JG]   1946 No prior EKG for  comparison. [JG]   2001 Patient denies any chest pain or anginal equivalents at present.  Patient reports last symptoms were on his way into the emergency department but resolved with nitroglycerin.  Patient is status post 81 mg aspirin this morning, given additional aspirin, nitroglycerin ordered as needed. [JG]   2002 HEART SCORE:    History #1  (Highly suspicious 2, Moderately suspicious 1, Slightly or non-suspicious 0)    ECG #1  (Significant ST depression 2,  Nonspecific repol disturbance 1, Normal 0)    Age #2  (> or = 65 2, 46-65 1,  < or = 45 0)    Risk factors #2  (hypercholesterolemia, HTN, DM, smoking, pos fam hx, obesity)  (> or = to 3 RF 2, 1 or 2 1, No risk factors 0)    Troponin #0  (> or = 3x normal limit 2, 1-3x normal limit 1, < or = Normal limit 0)    HEART Score Key:  Scores 0-3: 0.9-1.7% risk of adverse cardiac event. In the HEART Score study, these patients were discharged (0.99% in the retrospective study, 1.7% in the prospective study)  Scores 4-6: 12-16.6% risk of adverse cardiac event. In the HEART Score study, these patients were admitted to the hospital. (11.6% retrospective, 16.6% prospective)  Scores =7: 50-65% risk of adverse cardiac event. In the HEART Score study, these patients were candidates for early invasive measures. 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 [JG]   2004 Phone call with [employer] for Assignment Editor.  Discussed the patient, relevant history, exam, diagnostics, ED findings/progress, and concerns. They agree to admit the patient to telemetry observation under Dr Mancilla. Care assumed by the admitting physician at this time.     [JG]      ED Course User Index  [JG] Eduardo Briceño MD       AS OF 20:06 EDT VITALS:    BP - 167/69  HR - 80  TEMP - 98.2 °F (36.8 °C) (Tympanic)  O2 SATS - 97%    DIAGNOSIS  Final diagnoses:   Acute chest pain   Hypertension, unspecified type    Hyperglycemia   History of coronary artery disease   Pulmonary nodule         DISPOSITION  ADMISSION    Discussed treatment plan and reason for admission with pt/family and admitting physician.  Pt/family voiced understanding of the plan for admission for further testing/treatment as needed.          Eduardo Briceño MD  08/23/22 2006

## 2022-08-24 ENCOUNTER — APPOINTMENT (OUTPATIENT)
Dept: CARDIOLOGY | Facility: HOSPITAL | Age: 77
End: 2022-08-24

## 2022-08-24 LAB
ACT BLD: 254 SECONDS (ref 82–152)
ACT BLD: 300 SECONDS (ref 82–152)
ANION GAP SERPL CALCULATED.3IONS-SCNC: 9.1 MMOL/L (ref 5–15)
BH CV ECHO MEAS - ACS: 1.69 CM
BH CV ECHO MEAS - AO MAX PG: 5.3 MMHG
BH CV ECHO MEAS - AO MEAN PG: 2.49 MMHG
BH CV ECHO MEAS - AO ROOT DIAM: 3.4 CM
BH CV ECHO MEAS - AO V2 MAX: 115 CM/SEC
BH CV ECHO MEAS - AO V2 VTI: 24.2 CM
BH CV ECHO MEAS - AVA(I,D): 3 CM2
BH CV ECHO MEAS - EDV(CUBED): 86.2 ML
BH CV ECHO MEAS - EDV(MOD-SP2): 70 ML
BH CV ECHO MEAS - EDV(MOD-SP4): 133 ML
BH CV ECHO MEAS - EF(MOD-BP): 56 %
BH CV ECHO MEAS - EF(MOD-SP2): 54.3 %
BH CV ECHO MEAS - EF(MOD-SP4): 54.9 %
BH CV ECHO MEAS - ESV(CUBED): 52.1 ML
BH CV ECHO MEAS - ESV(MOD-SP2): 32 ML
BH CV ECHO MEAS - ESV(MOD-SP4): 60 ML
BH CV ECHO MEAS - FS: 15.5 %
BH CV ECHO MEAS - IVS/LVPW: 1.01 CM
BH CV ECHO MEAS - IVSD: 1 CM
BH CV ECHO MEAS - LV MASS(C)D: 147.4 GRAMS
BH CV ECHO MEAS - LV MAX PG: 2.7 MMHG
BH CV ECHO MEAS - LV MEAN PG: 1.5 MMHG
BH CV ECHO MEAS - LV V1 MAX: 81.9 CM/SEC
BH CV ECHO MEAS - LV V1 VTI: 25 CM
BH CV ECHO MEAS - LVIDD: 4.4 CM
BH CV ECHO MEAS - LVIDS: 3.7 CM
BH CV ECHO MEAS - LVOT AREA: 2.9 CM2
BH CV ECHO MEAS - LVOT DIAM: 1.92 CM
BH CV ECHO MEAS - LVPWD: 0.99 CM
BH CV ECHO MEAS - MV A DUR: 0.12 SEC
BH CV ECHO MEAS - MV A MAX VEL: 90.3 CM/SEC
BH CV ECHO MEAS - MV DEC SLOPE: 201.1 CM/SEC2
BH CV ECHO MEAS - MV DEC TIME: 0.25 MSEC
BH CV ECHO MEAS - MV E MAX VEL: 62.2 CM/SEC
BH CV ECHO MEAS - MV E/A: 0.69
BH CV ECHO MEAS - MV MEAN PG: 1.48 MMHG
BH CV ECHO MEAS - MV P1/2T: 98.7 MSEC
BH CV ECHO MEAS - MV V2 VTI: 28.3 CM
BH CV ECHO MEAS - MVA(P1/2T): 2.23 CM2
BH CV ECHO MEAS - MVA(VTI): 2.6 CM2
BH CV ECHO MEAS - PA V2 MAX: 136.4 CM/SEC
BH CV ECHO MEAS - PULM A REVS DUR: 0.13 SEC
BH CV ECHO MEAS - PULM A REVS VEL: 76 CM/SEC
BH CV ECHO MEAS - PULM DIAS VEL: 42 CM/SEC
BH CV ECHO MEAS - PULM S/D: 1.93
BH CV ECHO MEAS - PULM SYS VEL: 80.9 CM/SEC
BH CV ECHO MEAS - RAP SYSTOLE: 3 MMHG
BH CV ECHO MEAS - RV MAX PG: 1.33 MMHG
BH CV ECHO MEAS - RV V1 MAX: 57.8 CM/SEC
BH CV ECHO MEAS - RV V1 VTI: 14.6 CM
BH CV ECHO MEAS - RVSP: 21.9 MMHG
BH CV ECHO MEAS - SV(LVOT): 72.5 ML
BH CV ECHO MEAS - SV(MOD-SP2): 38 ML
BH CV ECHO MEAS - SV(MOD-SP4): 73 ML
BH CV ECHO MEAS - TR MAX PG: 18.9 MMHG
BH CV ECHO MEAS - TR MAX VEL: 217.2 CM/SEC
BUN SERPL-MCNC: 11 MG/DL (ref 8–23)
BUN/CREAT SERPL: 14.5 (ref 7–25)
CALCIUM SPEC-SCNC: 9.2 MG/DL (ref 8.6–10.5)
CHLORIDE SERPL-SCNC: 104 MMOL/L (ref 98–107)
CHOLEST SERPL-MCNC: 127 MG/DL (ref 0–200)
CO2 SERPL-SCNC: 24.9 MMOL/L (ref 22–29)
CREAT SERPL-MCNC: 0.76 MG/DL (ref 0.76–1.27)
DEPRECATED RDW RBC AUTO: 43.4 FL (ref 37–54)
EGFRCR SERPLBLD CKD-EPI 2021: 92 ML/MIN/1.73
ERYTHROCYTE [DISTWIDTH] IN BLOOD BY AUTOMATED COUNT: 13.3 % (ref 12.3–15.4)
GLUCOSE BLDC GLUCOMTR-MCNC: 141 MG/DL (ref 70–130)
GLUCOSE BLDC GLUCOMTR-MCNC: 83 MG/DL (ref 70–130)
GLUCOSE BLDC GLUCOMTR-MCNC: 97 MG/DL (ref 70–130)
GLUCOSE SERPL-MCNC: 87 MG/DL (ref 65–99)
HCT VFR BLD AUTO: 39.2 % (ref 37.5–51)
HDLC SERPL-MCNC: 42 MG/DL (ref 40–60)
HGB BLD-MCNC: 12.8 G/DL (ref 13–17.7)
LDLC SERPL CALC-MCNC: 69 MG/DL (ref 0–100)
LDLC/HDLC SERPL: 1.63 {RATIO}
LEFT ATRIUM VOLUME INDEX: 23.6 ML/M2
LV EF 2D ECHO EST: 56 %
MAXIMAL PREDICTED HEART RATE: 142 BPM
MCH RBC QN AUTO: 29 PG (ref 26.6–33)
MCHC RBC AUTO-ENTMCNC: 32.7 G/DL (ref 31.5–35.7)
MCV RBC AUTO: 88.7 FL (ref 79–97)
PLATELET # BLD AUTO: 150 10*3/MM3 (ref 140–450)
PMV BLD AUTO: 10.2 FL (ref 6–12)
POTASSIUM SERPL-SCNC: 4 MMOL/L (ref 3.5–5.2)
QT INTERVAL: 397 MS
RBC # BLD AUTO: 4.42 10*6/MM3 (ref 4.14–5.8)
SODIUM SERPL-SCNC: 138 MMOL/L (ref 136–145)
STRESS TARGET HR: 121 BPM
TRIGL SERPL-MCNC: 82 MG/DL (ref 0–150)
VLDLC SERPL-MCNC: 16 MG/DL (ref 5–40)
WBC NRBC COR # BLD: 5.41 10*3/MM3 (ref 3.4–10.8)

## 2022-08-24 PROCEDURE — C1769 GUIDE WIRE: HCPCS | Performed by: INTERNAL MEDICINE

## 2022-08-24 PROCEDURE — 99222 1ST HOSP IP/OBS MODERATE 55: CPT | Performed by: INTERNAL MEDICINE

## 2022-08-24 PROCEDURE — 85347 COAGULATION TIME ACTIVATED: CPT

## 2022-08-24 PROCEDURE — C1874 STENT, COATED/COV W/DEL SYS: HCPCS | Performed by: INTERNAL MEDICINE

## 2022-08-24 PROCEDURE — 85027 COMPLETE CBC AUTOMATED: CPT | Performed by: NURSE PRACTITIONER

## 2022-08-24 PROCEDURE — C9604 PERC D-E COR REVASC T CABG S: HCPCS | Performed by: INTERNAL MEDICINE

## 2022-08-24 PROCEDURE — C1887 CATHETER, GUIDING: HCPCS | Performed by: INTERNAL MEDICINE

## 2022-08-24 PROCEDURE — 93306 TTE W/DOPPLER COMPLETE: CPT

## 2022-08-24 PROCEDURE — 80061 LIPID PANEL: CPT | Performed by: NURSE PRACTITIONER

## 2022-08-24 PROCEDURE — 25010000002 HEPARIN (PORCINE) PER 1000 UNITS: Performed by: INTERNAL MEDICINE

## 2022-08-24 PROCEDURE — 027036Z DILATION OF CORONARY ARTERY, ONE ARTERY WITH THREE DRUG-ELUTING INTRALUMINAL DEVICES, PERCUTANEOUS APPROACH: ICD-10-PCS | Performed by: INTERNAL MEDICINE

## 2022-08-24 PROCEDURE — 93571 IV DOP VEL&/PRESS C FLO 1ST: CPT | Performed by: INTERNAL MEDICINE

## 2022-08-24 PROCEDURE — B2111ZZ FLUOROSCOPY OF MULTIPLE CORONARY ARTERIES USING LOW OSMOLAR CONTRAST: ICD-10-PCS | Performed by: INTERNAL MEDICINE

## 2022-08-24 PROCEDURE — 4A023N7 MEASUREMENT OF CARDIAC SAMPLING AND PRESSURE, LEFT HEART, PERCUTANEOUS APPROACH: ICD-10-PCS | Performed by: INTERNAL MEDICINE

## 2022-08-24 PROCEDURE — 0 IOPAMIDOL PER 1 ML: Performed by: INTERNAL MEDICINE

## 2022-08-24 PROCEDURE — 92937 PRQ TRLUML REVSC CAB GRF 1: CPT | Performed by: INTERNAL MEDICINE

## 2022-08-24 PROCEDURE — B2171ZZ FLUOROSCOPY OF RIGHT INTERNAL MAMMARY BYPASS GRAFT USING LOW OSMOLAR CONTRAST: ICD-10-PCS | Performed by: INTERNAL MEDICINE

## 2022-08-24 PROCEDURE — 93306 TTE W/DOPPLER COMPLETE: CPT | Performed by: INTERNAL MEDICINE

## 2022-08-24 PROCEDURE — 25010000002 FENTANYL CITRATE (PF) 50 MCG/ML SOLUTION: Performed by: INTERNAL MEDICINE

## 2022-08-24 PROCEDURE — 25010000002 PERFLUTREN (DEFINITY) 8.476 MG IN SODIUM CHLORIDE (PF) 0.9 % 10 ML INJECTION: Performed by: NURSE PRACTITIONER

## 2022-08-24 PROCEDURE — 93005 ELECTROCARDIOGRAM TRACING: CPT | Performed by: INTERNAL MEDICINE

## 2022-08-24 PROCEDURE — 82962 GLUCOSE BLOOD TEST: CPT

## 2022-08-24 PROCEDURE — B2151ZZ FLUOROSCOPY OF LEFT HEART USING LOW OSMOLAR CONTRAST: ICD-10-PCS | Performed by: INTERNAL MEDICINE

## 2022-08-24 PROCEDURE — 25010000002 MIDAZOLAM PER 1 MG: Performed by: INTERNAL MEDICINE

## 2022-08-24 PROCEDURE — B2181ZZ FLUOROSCOPY OF LEFT INTERNAL MAMMARY BYPASS GRAFT USING LOW OSMOLAR CONTRAST: ICD-10-PCS | Performed by: INTERNAL MEDICINE

## 2022-08-24 PROCEDURE — C1760 CLOSURE DEV, VASC: HCPCS | Performed by: INTERNAL MEDICINE

## 2022-08-24 PROCEDURE — 93459 L HRT ART/GRFT ANGIO: CPT | Performed by: INTERNAL MEDICINE

## 2022-08-24 PROCEDURE — 80048 BASIC METABOLIC PNL TOTAL CA: CPT | Performed by: NURSE PRACTITIONER

## 2022-08-24 PROCEDURE — C1894 INTRO/SHEATH, NON-LASER: HCPCS | Performed by: INTERNAL MEDICINE

## 2022-08-24 DEVICE — XIENCE SKYPOINT™ EVEROLIMUS ELUTING CORONARY STENT SYSTEM 2.75 MM X 12 MM / RAPID-EXCHANGE
Type: IMPLANTABLE DEVICE | Site: HEART | Status: FUNCTIONAL
Brand: XIENCE SKYPOINT™

## 2022-08-24 DEVICE — XIENCE SKYPOINT™ EVEROLIMUS ELUTING CORONARY STENT SYSTEM 3.00 MM X 15 MM / RAPID-EXCHANGE
Type: IMPLANTABLE DEVICE | Site: HEART | Status: FUNCTIONAL
Brand: XIENCE SKYPOINT™

## 2022-08-24 DEVICE — XIENCE SKYPOINT™ EVEROLIMUS ELUTING CORONARY STENT SYSTEM 3.00 MM X 08 MM / RAPID-EXCHANGE
Type: IMPLANTABLE DEVICE | Site: HEART | Status: FUNCTIONAL
Brand: XIENCE SKYPOINT™

## 2022-08-24 RX ORDER — CLOPIDOGREL BISULFATE 75 MG/1
TABLET ORAL AS NEEDED
Status: DISCONTINUED | OUTPATIENT
Start: 2022-08-24 | End: 2022-08-24 | Stop reason: HOSPADM

## 2022-08-24 RX ORDER — FENTANYL CITRATE 50 UG/ML
INJECTION, SOLUTION INTRAMUSCULAR; INTRAVENOUS AS NEEDED
Status: DISCONTINUED | OUTPATIENT
Start: 2022-08-24 | End: 2022-08-24 | Stop reason: HOSPADM

## 2022-08-24 RX ORDER — ATORVASTATIN CALCIUM 20 MG/1
40 TABLET, FILM COATED ORAL NIGHTLY
Status: DISCONTINUED | OUTPATIENT
Start: 2022-08-24 | End: 2022-08-25 | Stop reason: HOSPADM

## 2022-08-24 RX ORDER — HYDROCODONE BITARTRATE AND ACETAMINOPHEN 5; 325 MG/1; MG/1
1 TABLET ORAL EVERY 4 HOURS PRN
Status: DISCONTINUED | OUTPATIENT
Start: 2022-08-24 | End: 2022-08-25 | Stop reason: HOSPADM

## 2022-08-24 RX ORDER — CLOPIDOGREL BISULFATE 75 MG/1
75 TABLET ORAL DAILY
Status: DISCONTINUED | OUTPATIENT
Start: 2022-08-25 | End: 2022-08-25 | Stop reason: HOSPADM

## 2022-08-24 RX ORDER — HEPARIN SODIUM 1000 [USP'U]/ML
INJECTION, SOLUTION INTRAVENOUS; SUBCUTANEOUS AS NEEDED
Status: DISCONTINUED | OUTPATIENT
Start: 2022-08-24 | End: 2022-08-24 | Stop reason: HOSPADM

## 2022-08-24 RX ORDER — LIDOCAINE HYDROCHLORIDE 20 MG/ML
INJECTION, SOLUTION INFILTRATION; PERINEURAL AS NEEDED
Status: DISCONTINUED | OUTPATIENT
Start: 2022-08-24 | End: 2022-08-24 | Stop reason: HOSPADM

## 2022-08-24 RX ORDER — SODIUM CHLORIDE 9 MG/ML
125 INJECTION, SOLUTION INTRAVENOUS CONTINUOUS
Status: ACTIVE | OUTPATIENT
Start: 2022-08-24 | End: 2022-08-25

## 2022-08-24 RX ORDER — ONDANSETRON 4 MG/1
4 TABLET, FILM COATED ORAL EVERY 6 HOURS PRN
Status: DISCONTINUED | OUTPATIENT
Start: 2022-08-24 | End: 2022-08-25 | Stop reason: HOSPADM

## 2022-08-24 RX ORDER — ONDANSETRON 2 MG/ML
4 INJECTION INTRAMUSCULAR; INTRAVENOUS EVERY 6 HOURS PRN
Status: DISCONTINUED | OUTPATIENT
Start: 2022-08-24 | End: 2022-08-25 | Stop reason: HOSPADM

## 2022-08-24 RX ORDER — ASPIRIN 325 MG
TABLET ORAL AS NEEDED
Status: DISCONTINUED | OUTPATIENT
Start: 2022-08-24 | End: 2022-08-24 | Stop reason: HOSPADM

## 2022-08-24 RX ORDER — SODIUM CHLORIDE 9 MG/ML
75 INJECTION, SOLUTION INTRAVENOUS CONTINUOUS
Status: DISCONTINUED | OUTPATIENT
Start: 2022-08-24 | End: 2022-08-25 | Stop reason: HOSPADM

## 2022-08-24 RX ORDER — MIDAZOLAM HYDROCHLORIDE 1 MG/ML
INJECTION INTRAMUSCULAR; INTRAVENOUS AS NEEDED
Status: DISCONTINUED | OUTPATIENT
Start: 2022-08-24 | End: 2022-08-24 | Stop reason: HOSPADM

## 2022-08-24 RX ORDER — ASPIRIN 81 MG/1
81 TABLET ORAL DAILY
Status: DISCONTINUED | OUTPATIENT
Start: 2022-08-25 | End: 2022-08-25 | Stop reason: HOSPADM

## 2022-08-24 RX ORDER — ACETAMINOPHEN 325 MG/1
650 TABLET ORAL EVERY 4 HOURS PRN
Status: DISCONTINUED | OUTPATIENT
Start: 2022-08-24 | End: 2022-08-25 | Stop reason: HOSPADM

## 2022-08-24 RX ADMIN — LISINOPRIL 40 MG: 20 TABLET ORAL at 11:24

## 2022-08-24 RX ADMIN — SODIUM CHLORIDE 125 ML/HR: 9 INJECTION, SOLUTION INTRAVENOUS at 20:41

## 2022-08-24 RX ADMIN — SODIUM CHLORIDE 75 ML/HR: 9 INJECTION, SOLUTION INTRAVENOUS at 11:28

## 2022-08-24 RX ADMIN — PERFLUTREN 1.5 ML: 6.52 INJECTION, SUSPENSION INTRAVENOUS at 08:59

## 2022-08-24 RX ADMIN — ATORVASTATIN CALCIUM 40 MG: 20 TABLET, FILM COATED ORAL at 20:41

## 2022-08-24 NOTE — PROGRESS NOTES
Pt presents to the ED c/o  intermittent sharp chest pains in the left side of his chest lasting for 30 to 60 minutes starting this morning around 3 AM.  Patient does have a history of coronary disease with CABG back in 1994.  Currently no symptoms and pain-free.     On exam,   General: No acute distress, nontoxic, nondiaphoretic  HEENT: Mucous membranes moist, atraumatic, EOMI  Neck: Full ROM  Pulm: Symmetric chest rise, nonlabored, lungs CTAB  Cardiovascular: Regular rate and rhythm, intact distal pulses  GI: Soft, nontender, nondistended, no rebound, no guarding, bowel sounds present  MSK: Full ROM, no deformity  Skin: Warm, dry  Neuro: Awake, alert, oriented x 4, GCS 15, moving all extremities, no focal deficits  Psych: Calm, cooperative      N95, protective eye goggles, and gloves used during this encounter. Patient in surgical mask.      Plan: Plan for cardiology evaluation the morning, echocardiogram ordered.  Patient in no acute distress without active pain at this time.  Does have a history of coronary disease.  Symptoms are a bit atypical.  All questions and concerns addressed.       Attestation:  The MARCUS and I have discussed this patient's history, physical exam, and treatment plan.  I have reviewed the documentation and personally had a face to face interaction with the patient. I affirm the documentation and agree with the treatment and plan.  The attached note describes my personal findings.

## 2022-08-24 NOTE — PROGRESS NOTES
[facility]     Progress Note    Name: Kerrie Tyler ADMIT: 2022   : 1945  PCP: Mayito Sepulveda MD    MRN: [id number] LOS: 0 days   AGE/SEX: 77 y.o. male  ROOM: [facility]     Subjective   Subjective     Subjective   Patient reports chest pain began yesterday and resolved after 3 nitro. He denies recurrent pain. No shortness of breath or nausea.     Review of Systems   All other systems reviewed and are negative.         Objective   Objective     Objective   Vital Signs  Temp:  [97.9 °F (36.6 °C)-98.3 °F (36.8 °C)] 98.3 °F (36.8 °C)  Heart Rate:  [53-80] 70  Resp:  [16-18] 16  BP: ()/(52-70) 108/58  SpO2:  [97 %-99 %] 97 %  on   ;   Device (Oxygen Therapy): room air  Body mass index is 25.82 kg/m².  Physical Exam  Vitals and nursing note reviewed.   Constitutional:       General: He is not in acute distress.     Appearance: Normal appearance. He is normal weight.   HENT:      Head: Normocephalic and atraumatic.      Nose: Nose normal.      Mouth/Throat:      Mouth: Mucous membranes are moist.   Eyes:      Extraocular Movements: Extraocular movements intact.   Cardiovascular:      Rate and Rhythm: Normal rate and regular rhythm.      Pulses: Normal pulses.      Heart sounds: Normal heart sounds.   Pulmonary:      Effort: Pulmonary effort is normal. No respiratory distress.      Breath sounds: Normal breath sounds.   Abdominal:      General: Abdomen is flat. Bowel sounds are normal.      Palpations: Abdomen is soft.   Musculoskeletal:         General: No swelling. Normal range of motion.      Cervical back: Normal range of motion. No rigidity.   Skin:     General: Skin is warm and dry.   Neurological:      General: No focal deficit present.      Mental Status: He is alert and oriented to person, place, and time. Mental status is at baseline.   Psychiatric:         Mood and Affect: Mood normal.         Behavior: Behavior normal.         Thought Content: Thought content normal.          Judgment: Judgment normal.         Results Review     I reviewed the patient's new clinical results.  Results from last 7 days   Lab Units 08/24/22  0520 08/23/22  1850   WBC 10*3/mm3 5.41 8.36   HEMOGLOBIN g/dL 12.8* 14.1   PLATELETS 10*3/mm3 150 168     Results from last 7 days   Lab Units 08/24/22  0520 08/23/22  1850   SODIUM mmol/L 138 138   POTASSIUM mmol/L 4.0 4.4   CHLORIDE mmol/L 104 101   CO2 mmol/L 24.9 25.3   BUN mg/dL 11 13   CREATININE mg/dL 0.76 1.18   GLUCOSE mg/dL 87 114*   Estimated Creatinine Clearance: 82.3 mL/min (by C-G formula based on SCr of 0.76 mg/dL).  Results from last 7 days   Lab Units 08/23/22  1850   ALBUMIN g/dL 4.50   BILIRUBIN mg/dL 0.5   ALK PHOS U/L 64   AST (SGOT) U/L 21   ALT (SGPT) U/L 17     Results from last 7 days   Lab Units 08/24/22  0520 08/23/22  1850   CALCIUM mg/dL 9.2 9.6   ALBUMIN g/dL  --  4.50   MAGNESIUM mg/dL  --  1.9       COVID19   Date Value Ref Range Status   08/23/2022 Not Detected Not Detected - Ref. Range Final     No results found for: HGBA1C, POCGLU    XR Chest 2 View  XR CHEST 2 VW-     HISTORY:  Chest pain, history of CABG x3.     COMPARISON:  None     FINDINGS:    Two views of the chest were obtained.     Support Devices:  None.  Cardiac Silhouette/Mediastinum/Livia:  The cardiac silhouette is normal  in size. There is at least one coronary artery stent. There is calcific  aortic atherosclerosis. Mediastinal and hilar contours are within normal  limits.  Lungs/Pleural Spaces:  There is no focal consolidation or effusion.  There is a somewhat nodular opacity projecting over the lower right lung  base on the frontal view, which may reflect a pulmonary nodule and is  incompletely assessed. Recommend comparison with prior imaging, if  available, otherwise recommend follow-up CT chest.  Chest Wall/Diaphragm/Upper Abdomen:  Median sternotomy wires are intact.  There is calcific atherosclerosis in the upper abdomen.     This report was finalized on 8/23/2022  7:12 PM by Dr. Mansi Connolly M.D.       Scheduled Medications  insulin lispro, 0-9 Units, Subcutaneous, TID AC  lisinopril, 40 mg, Oral, Q24H  rosuvastatin, 20 mg, Oral, Daily  tamsulosin, 0.4 mg, Oral, Daily    Infusions   Diet  NPO Diet NPO Type: Sips with Meds         Assessment/Plan   Assessment / Plan       Active Hospital Problems    Diagnosis  POA   • Chest pain [R07.9]  Yes      Resolved Hospital Problems   No resolved problems to display.       77 y.o. male admitted with chest pain.     Chest pain/CAD  -Initial troponin negative, trend  -EKG shows sinus rhythm with a rate of 72, T wave inversion in leads V1- V3  -Cardiology consult  -Telemetry monitoring  -Patient has received aspirin and nitroglycerin while in the ED  -Echo in a.m.     DM2  -Accu-Chek AC at bedtime  -Moderate insulin sliding scale     Hypertension hyperlipidemia  -Chronic conditions, continue medications  -Lipid panel pending  -Vital signs per nursing      · SCDs for DVT prophylaxis.  · Full code.  · Discussed with patient.  · Anticipate discharge today.    This progress note will additionally serve as discharge summary.     BEN Marrero  [facility]  08/24/22  09:28 EDT

## 2022-08-24 NOTE — PAYOR COMM NOTE
"Lindsay Hinton (77 y.o. Male)     ATTN: INITIAL CLINICALS TO REVIEW FOR INPATIENT AUTHORIZATION: REF# [id number]    PLEASE REPLY TO UR DEPT: [phone],  [phone]    PATIENT WAS IN OBSERVATION THEN CHANGED TO INPATIENT    [facility]              Date of Birth   1945    Social Security Number       Address   [address] [address]    Home Phone   [phone]    MRN   [id number]       Scientology   None    Marital Status                               Admission Date   8/23/22    Admission Type   Emergency    Admitting Provider   James Mancilla MD    Attending Provider   Tony Littlejohn MD    Department, Room/Bed   [facility] CATH LAB, Pool/CATH       Discharge Date       Discharge Disposition       Discharge Destination                               Attending Provider: Tony Littlejohn MD    Allergies: Sulfa Antibiotics    Isolation: None   Infection: None   Code Status: CPR   Advance Care Planning Activity    Ht: 167.6 cm (66\")   Wt: 72.6 kg (160 lb)    Admission Cmt: None   Principal Problem: None                Active Insurance as of 8/23/2022     Primary Coverage     Payor Plan Insurance Group Employer/Plan Group    MEDICARE MEDICARE B ONLY      Payor Plan Address Payor Plan Phone Number Payor Plan Fax Number Effective Dates    [address] [address] [phone]  9/1/2020 - None Entered    [facility] [id number]       Subscriber Name Subscriber Birth Date Member ID       LINDSAY HINTON 1945 [id number]           Secondary Coverage     Payor Plan Insurance Group Employer/Plan Group    [place] [id number]     Payor Plan Address Payor Plan Phone Number Payor Plan Fax Number Effective Dates    [employer] [address] [address] [phone]  1/1/2021 - None Entered    [facility] [id number]       Subscriber Name Subscriber Birth Date Member ID       LINDSAY HINTON 1945 [id number]                 Emergency Contacts     "  (Rel.) Home Phone Work Phone Mobile Phone    EDDIE TYLER (Son) [phone] -- [phone]               History & Physical      Micki Singh APRN at 22     Attestation signed by James Mancilla MD at 222    MD Attestation Note    I supervised care provided by the midlevel provider.    The MARCUS and I have discussed this patient's history, physical exam, and treatment plan.   I provided a substantive portion of the care of this patient.  I have reviewed the documentation and personally had a face to face interaction with the patient  I affirm the documentation and agree with the treatment and plan.   My personal findings are documented in a separate note.                     .   [facility]   HISTORY AND PHYSICAL    Patient Name: Kerrie Tyler  : 1945  MRN: [id number]  Primary Care Physician:  Myaito Sepulveda MD  Date of admission: 2022    Subjective   Subjective     Chief Complaint: Chest pain    HPI:    Kerrie Tyler is a 77 y.o. male with past medical history including but not limited to hypertension, hyperlipidemia, MI, CAD s/p CABG, BPH and diabetes presents [facility] with chest pain.  Patient report sudden onset of pain around 3:30 AM this morning that woke him from sleep.  Reports pain lasted for about 30 minutes and resolved after taking 1 sublingual nitroglycerin.  Patient describes her symptoms as stabbing and radiating to left neck and left shoulder.  Pain is nonexertional.  Patient reports having another episode at 10 AM and 3 PM and he had taken nitroglycerin that resolved his pain.  Report associated shortness of breath and nausea but denies diaphoresis, vomiting, or back pain.  Currently patient asymptomatic.  Patient reports that he followed with cardiologist in [place] but states that he has not seen one since he has moved here 2 years ago.  Denies abdominal pain, nausea, vomiting, or diarrhea.  Denies cough, chills, fever, or   lower extremities edema.  Denies dysuria, materia, increasing or frequency/urgency.    Review of Systems   All systems were reviewed and negative except for: All systems were reviewed and negative except for the mention of in HPI    Personal History     Past Medical History:   Diagnosis Date   • Kidney stone    • Myocardial infarction (HCC)        Past Surgical History:   Procedure Laterality Date   • CATARACT EXTRACTION, BILATERAL Bilateral 1995   • CORONARY ARTERY BYPASS GRAFT  1994    3 vessel   • EXTRACORPOREAL SHOCK WAVE LITHOTRIPSY (ESWL) Bilateral 2004   • TONSILLECTOMY         Family History: family history includes Diabetes in his brother and father; Heart disease in his brother, daughter, and father; Hyperlipidemia in his brother, daughter, and father; Hypertension in his brother, daughter, and father. Otherwise pertinent FHx was reviewed and not pertinent to current issue.    Social History:  reports that he quit smoking about 28 years ago. He has a 6.00 pack-year smoking history. He has never used smokeless tobacco. He reports that he does not drink alcohol and does not use drugs.    Home Medications:  Blood Gluc Meter Disp-Strips, Lancets Thin, Vitamin D, fish oil, glucose monitor, metFORMIN ER, metoprolol tartrate, nitroglycerin, rosuvastatin, tamsulosin, and trandolapril    Allergies:  Allergies   Allergen Reactions   • Sulfa Antibiotics Rash       Objective   Objective     Vitals:   Temp:  [98.2 °F (36.8 °C)] 98.2 °F (36.8 °C)  Heart Rate:  [68-80] 74  Resp:  [16] 16  BP: (138-167)/(69-70) 167/69  Physical Exam    Constitutional: Awake, alert, in no acute distress   Eyes: PERRLA, sclerae anicteric, no conjunctival injection   HENT: NCAT, mucous membranes moist   Neck: Supple, no thyromegaly, no lymphadenopathy, trachea midline   Respiratory: Clear to auscultation bilaterally, nonlabored respirations, no wheezing or stridor   Cardiovascular: RRR, no murmurs, rubs, or gallops, palpable pedal pulses  bilaterally   Gastrointestinal: Positive bowel sounds, soft, nontender, nondistended   Musculoskeletal: No bilateral ankle edema, no clubbing or cyanosis to extremities   Psychiatric: Appropriate affect, cooperative   Neurologic: Oriented x 3, strength symmetric in all extremities, Cranial Nerves grossly intact to confrontation, speech clear   Skin: No rashes     Result Review    Result Review:  I have personally reviewed the results from the time of this admission to 8/23/2022 20:18 EDT and agree with these findings:  []  Laboratory list / accordion  []  Microbiology  []  Radiology  []  EKG/Telemetry   []  Cardiology/Vascular   []  Pathology  []  Old records  []  Other:  Most notable findings include:  Troponin<0.010, , potassium 4.4, creatinine 1.81, WBC 8.3, hemoglobin 14.1, platelets 160      Assessment & Plan   Assessment / Plan     Brief Patient Summary:  Kerrie Tyler is a 77 y.o. male who was seen and evaluated the ED for chest pain.  Patient's been admitted to observation for further evaluation and cardiology consult.    Active Hospital Problems:  Active Hospital Problems    Diagnosis    • Chest pain      Plan:   Chest pain  CAD  -Initial troponin negative, trend  -EKG shows sinus rhythm with a rate of 72, T wave inversion in leads V1- V3  -Cardiology consult  -Telemetry monitoring  -Patient has received aspirin and nitroglycerin while in the ED  -Echo in a.m.    DM2  -Accu-Chek AC at bedtime  -Moderate insulin sliding scale    Hypertension hyperlipidemia  -Chronic conditions, continue medications  -Lipid panel pending  -Vital signs per nursing      DVT prophylaxis:  Mechanical DVT prophylaxis orders are present.    CODE STATUS:    Level Of Support Discussed With: Patient  Code Status (Patient has no pulse and is not breathing): CPR (Attempt to Resuscitate)  Medical Interventions (Patient has pulse or is breathing): Full Support    Admission Status:  I believe this patient meets observation  status.    I wore an face mask, eye protection, and gloves during this patient encounter. Patient also wearing a surgical mask. Hand hygeine performed before and after seeing the patient.    Electronically signed by BEN Schuster, 08/23/22, 8:18 PM EDT.             Electronically signed by James Mancilla MD at 08/23/22 2232          Emergency Department Notes      Eduardo Briceño MD at 08/23/22 1945           EMERGENCY DEPARTMENT ENCOUNTER  I wore full protective equipment throughout this patient encounter including a N95 mask, eye shield, gown and gloves. Hand hygiene was performed before donning protective equipment and after removal when leaving the room.    Room Number:  12/12  Date of encounter:  8/23/2022  PCP: Mayito Sepulveda MD    HPI:  Context: Kerrie Tyler is a 77 y.o. male who presents to the ED c/o chief complaint of chest pain.  Patient complains of intermittent chest pain that began last night.  Chest pain was left-sided, diffuse, described as pressure, radiate to the neck as well as to the left arm.  Patient reports associated shortness of breath, no nausea or vomiting, no diaphoresis.  Patient is unable to say if pain was exertional.  Patient reports that pain was slightly relieved with nitroglycerin.  Patient reports that last episode of chest pain was while he was on his way into the emergency department, ago and relieved with nitroglycerin.  Patient reports he is currently asymptomatic.  Patient reports that prior to onset of chest pain he was at baseline health, denies any cough or upper respiratory symptoms, no abdominal pain, no indigestion, eating and drinking normally.  Patient reports a remote history of prior MI, occurred in 1994.  Patient reports that he was previously followed by cardiology but is not currently followed by cardiology.  Patient reports similar symptoms with previous MI.  Patient reports that he took 81 mg aspirin earlier today.    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 ([facility]) 2020   • S/P CABG x 3 2020   • Benign prostatic hyperplasia with urinary hesitancy 2021   • Vitamin D deficiency 2021   • Presbycusis of both ears 2021   • Diabetic retinopathy associated with type 2 diabetes mellitus ([facility]) 2021     Resolved Ambulatory Problems     Diagnosis Date Noted   • No Resolved Ambulatory Problems     Past Medical History:   Diagnosis Date   • Kidney stone    • Myocardial infarction ([facility])        PAST SURGICAL HISTORY  Past Surgical History:   Procedure Laterality Date   • CATARACT EXTRACTION, BILATERAL Bilateral    • CORONARY ARTERY BYPASS GRAFT      3 vessel   • EXTRACORPOREAL SHOCK WAVE LITHOTRIPSY (ESWL) Bilateral    • TONSILLECTOMY         FAMILY HISTORY  Family History   Problem Relation Age of Onset   • Diabetes Father    • Heart disease Father    • Hypertension Father    • Hyperlipidemia Father    • Diabetes Brother    • Heart disease Brother    • Hypertension Brother    • Hyperlipidemia Brother    • Heart disease Daughter    • Hyperlipidemia Daughter    • Hypertension Daughter    • Colon cancer Neg Hx    • Prostate cancer Neg Hx        SOCIAL HISTORY  Social History     Socioeconomic History   • Marital status:    Tobacco Use   • Smoking status: Former Smoker     Packs/day: 0.50     Years: 12.00     Pack years: 6.00     Quit date:      Years since quittin.6   • Smokeless tobacco: Never Used   Substance and Sexual Activity   • Alcohol use: Never   • Drug use: Never   • Sexual activity: Yes     Partners: Female       ALLERGIES  Sulfa antibiotics    The  patient's allergies have been reviewed    REVIEW OF SYSTEMS  All systems reviewed and negative except for those discussed in HPI.     PHYSICAL EXAM  I have reviewed the triage vital signs and nursing notes.  ED Triage Vitals [08/23/22 1815]   Temp Heart Rate Resp BP SpO2   98.2 °F (36.8 °C) 80 16 -- 97 %      Temp src Heart Rate Source Patient Position BP Location FiO2 (%)   Tympanic Monitor -- -- --       General: No acute distress.  HENT: NCAT, PERRL, Nares patent.  Eyes: no scleral icterus.  Neck: trachea midline, no ROM limitations.  CV: regular rhythm, regular rate.  Respiratory: normal effort, CTAB.  Abdomen: soft, nondistended, NTTP, no rebound tenderness, no guarding or rigidity.  Musculoskeletal: no deformity.  Neuro: alert, moves all extremities, follows commands.  Skin: warm, dry.    LAB RESULTS  I ordered the above labs and reviewed the results.    RADIOLOGY  XR Chest 2 View    Result Date: 8/23/2022  XR CHEST 2 VW-  HISTORY:  Chest pain, history of CABG x3.  COMPARISON:  None  FINDINGS:  Two views of the chest were obtained.  Support Devices:  None. Cardiac Silhouette/Mediastinum/Livia:  The cardiac silhouette is normal in size. There is at least one coronary artery stent. There is calcific aortic atherosclerosis. Mediastinal and hilar contours are within normal limits. Lungs/Pleural Spaces:  There is no focal consolidation or effusion. There is a somewhat nodular opacity projecting over the lower right lung base on the frontal view, which may reflect a pulmonary nodule and is incompletely assessed. Recommend comparison with prior imaging, if available, otherwise recommend follow-up CT chest. Chest Wall/Diaphragm/Upper Abdomen:  Median sternotomy wires are intact. There is calcific atherosclerosis in the upper abdomen.  This report was finalized on 8/23/2022 7:12 PM by Dr. Mansi Connolly M.D.        I ordered the above noted radiological studies. I reviewed the images and results. I agree with the  radiologist interpretation.    PROCEDURES  Procedures    MEDICATIONS GIVEN IN ER  Medications   sodium chloride 0.9 % flush 10 mL (has no administration in time range)   aspirin chewable tablet 162 mg (has no administration in time range)   nitroglycerin (NITROSTAT) SL tablet 0.4 mg (has no administration in time range)       PROGRESS, DATA ANALYSIS, CONSULTS, AND MEDICAL DECISION MAKING  A complete history and physical exam have been performed.  All available laboratory and imaging results have been reviewed by myself prior to disposition.    MDM  After the initial H&P, I discussed pertinent information from history and physical exam with patient/family.  Discussed differential diagnosis.  Discussed plan for ED evaluation/workup/treatment.  All questions answered.  Patient/family is agreeable with plan.  ED Course as of 08/23/22 2006   Tue Aug 23, 2022   1945 My differential diagnosis for chest pain includes but is not limited to:  Muscle strain, costochondritis, myositis, pleurisy, rib fracture, intercostal neuritis, herpes zoster, tumor, myocardial infarction, coronary syndrome, unstable angina, angina, aortic dissection, mitral valve prolapse, pericarditis, palpitations, pulmonary embolus, pneumonia, pneumothorax, lung cancer, GERD, esophagitis, esophageal spasm     [JG]   1946 EKG independently viewed and contemporaneously interpreted by ED physician. Time: 1830.  Rate 72.  Interpretation: Normal sinus rhythm, left axis deviation, normal QRS, no ST elevation or depression, T wave inversion in leads I and aVL as well as leads V2 through V3. [JG]   1946 No prior EKG for comparison. [JG]   2001 Patient denies any chest pain or anginal equivalents at present.  Patient reports last symptoms were on his way into the emergency department but resolved with nitroglycerin.  Patient is status post 81 mg aspirin this morning, given additional aspirin, nitroglycerin ordered as needed. [JG]   2002 HEART SCORE:    History  #1  (Highly suspicious 2, Moderately suspicious 1, Slightly or non-suspicious 0)    ECG #1  (Significant ST depression 2,  Nonspecific repol disturbance 1, Normal 0)    Age #2  (> or = 65 2, 46-65 1,  < or = 45 0)    Risk factors #2  (hypercholesterolemia, HTN, DM, smoking, pos fam hx, obesity)  (> or = to 3 RF 2, 1 or 2 1, No risk factors 0)    Troponin #0  (> or = 3x normal limit 2, 1-3x normal limit 1, < or = Normal limit 0)    HEART Score Key:  Scores 0-3: 0.9-1.7% risk of adverse cardiac event. In the HEART Score study, these patients were discharged (0.99% in the retrospective study, 1.7% in the prospective study)  Scores 4-6: 12-16.6% risk of adverse cardiac event. In the HEART Score study, these patients were admitted to the hospital. (11.6% retrospective, 16.6% prospective)  Scores =7: 50-65% risk of adverse cardiac event. In the HEART Score study, these patients were candidates for early invasive measures. 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 [JG]   2004 Phone call with [employer].  Discussed the patient, relevant history, exam, diagnostics, ED findings/progress, and concerns. They agree to admit the patient to telemetry observation under Dr Mancilla. Care assumed by the admitting physician at this time.     [JG]      ED Course User Index  [JG] Eduardo Briceño MD       AS OF 20:06 EDT VITALS:    BP - 167/69  HR - 80  TEMP - 98.2 °F (36.8 °C) (Tympanic)  O2 SATS - 97%    DIAGNOSIS  Final diagnoses:   Acute chest pain   Hypertension, unspecified type   Hyperglycemia   History of coronary artery disease   Pulmonary nodule         DISPOSITION  ADMISSION    Discussed treatment plan and reason for admission with pt/family and admitting physician.  Pt/family voiced understanding of the plan for admission for further testing/treatment as needed.          Eduardo Briceño MD  08/23/22  2006      Electronically signed by Eduardo Briceño MD at 08/23/22 2006         {Outbreak/Travel/Exposure Documentation......;  Question Available Choices Patient Response   COVID-19 Outbreak Screen:  Do you currently have a new onset of the following symptoms?        Fever/Chills, Cough, Shortness of air, Loss of taste or smell, No, Unknown  No (08/23/22 1814)   COVID-19 Outbreak Screen: In the last 14 days, have you had contact with anyone who is ill, has show any of the symptoms listed above and/or has been diagnosis with the 2019 Novel Coronavirus? This includes any immediate household members but excludes any patients with whom you have been in contact within your normal work duties wearing proper PPE, if you are a healthcare worker.  Yes, No, Unknown              No (08/23/22 1814)   COVID-19 Outbreak Screen: Who was notified? Free text (not recorded)   Ebola Screening Outbreak Screen: Have you traveled to the [place] the [place] or [place] within the past 21 days?  Yes, No, Unknown (not recorded)   Ebola Screening Outbreak Screen: Do you have ANY of the following symptoms: Fever/Chills, Vomiting, Diarrhea, Fatigue, Headache, Muscle pain, Unexplained bleeding, Abdominal (stomach) pain, No, Unknown (not recorded)   Ebola Screening Outbreak Screen: Name of Person notified Free text (not recorded)   Travel Screen: Have you traveled in the last month? If so, to what country have you traveled? If [place] what state? Yes, No, Unknown  List of all countries  List of all States No (08/23/22 1814)  (not recorded)  (not recorded)   Infection Risk: Do you currently have the following symptoms?  (If cough is selected, the Tuberculosis Screen is performed.) Cough, Fever, Rash, No No (08/23/22 1814)   Tuberculosis Screen: Do you have any of the following Tuberculosis Risks?  · Have you lived or spent time with anyone who had or may have TB?  · Have you lived in or visited any of the following areas for more than  one month: [place], [place], [place], [place] or [place], the [place] or [place]?  · Do you have HIV/AIDS?  · Have you lived in or worked in a nursing home, homeless shelter, correctional facility, or substance abuse treatment facility?   · No    If Yes do you have any of the following symptoms? Yes responses display to the right    If Yes, symptoms listed are:  Cough greater than or equal to 3 weeks, Loss of appetite, Unexplained weight loss, Night sweats, Bloody sputum or hemoptysis, Hoarseness, Fever, Fatigue, Chest pain, No (not recorded)  (not recorded)   Exposure Screen: Have you been exposed to any of these contagious diseases in the last month? Measles, Chickenpox, Meningitis, Pertussis, Whooping Cough, No No (08/23/22 1814)       Vital Signs (last 2 days)     Date/Time Temp Temp src Pulse Resp BP Patient Position SpO2    08/24/22 1615 -- -- 75 18 116/67 -- 95    08/24/22 1600 -- -- 79 18 134/76 -- 96    08/24/22 1545 -- -- 85 18 144/74 -- 95    08/24/22 1530 -- -- 66 18 132/65 -- 96    08/24/22 1515 -- -- 67 18 125/63 -- 96    08/24/22 1510 -- -- 76 18 128/73 Lying 95    08/24/22 15:03:12 -- -- -- 16 -- -- --    08/24/22 14:58:29 -- -- -- 16 -- -- --    08/24/22 14:53:21 -- -- -- 17 -- -- --    08/24/22 14:48:58 -- -- -- 20 -- -- --    08/24/22 14:44:04 -- -- -- 17 -- -- --    08/24/22 14:39:32 -- -- -- 19 -- -- --    08/24/22 14:34:44 -- -- -- 19 -- -- --    08/24/22 14:30:11 -- -- -- 18 -- -- --    08/24/22 14:25:25 -- -- -- 16 -- -- --    08/24/22 14:20:38 -- -- -- 18 -- -- --    08/24/22 14:14:51 -- -- -- 15 -- -- --    08/24/22 14:10:06 -- -- -- 16 -- -- --    08/24/22 14:05:30 -- -- -- 16 -- -- --    08/24/22 13:59:54 -- -- -- 20 -- -- --    08/24/22 13:59:43 -- -- -- 17 -- -- --    08/24/22 1354 -- -- -- 15 -- -- --    08/24/22 13:49:24 -- -- -- 15 -- -- --    08/24/22 13:45:06 -- -- -- 16 -- -- --    08/24/22 13:39:58 -- -- -- 20 -- -- --    08/24/22 13:35:13 -- -- -- 18 -- -- --     08/24/22 13:30:38 -- -- -- 18 -- -- --    08/24/22 13:25:45 -- -- -- 15 -- -- --    08/24/22 13:21:07 -- -- -- 16 -- -- --    08/24/22 13:16:11 -- -- -- 15 -- -- --    08/24/22 13:11:39 -- -- -- 16 -- -- --    08/24/22 1307 -- -- -- 14 -- -- --    08/24/22 1219 98.3 (36.8) Oral 62 16 120/62 Lying 96    08/24/22 0858 -- -- 70 -- 108/58 -- --    08/24/22 0807 98.3 (36.8) Oral 62 16 108/58 Lying 97    08/24/22 0418 98.1 (36.7) Oral 58 18 104/54 Lying 97    08/24/22 0018 97.9 (36.6) Oral 59 18 97/52 Lying 99    08/23/22 2103 98.1 (36.7) Oral 53 18 131/58 Lying 98    08/23/22 2021 -- -- 57 -- 137/63 -- 97    08/23/22 1952 -- -- -- -- 167/69 -- --    08/23/22 1951 -- -- 74 -- 167/69 -- 98    08/23/22 1949 -- -- 68 -- 138/70 -- 97    08/23/22 1815 98.2 (36.8) Tympanic 80 16 -- -- 97        Oxygen Therapy (last 2 days)     Date/Time SpO2 Device (Oxygen Therapy) Flow (L/min) Oxygen Concentration (%) ETCO2 (mmHg)    08/24/22 1615 95 room air -- -- --    08/24/22 1600 96 room air -- -- --    08/24/22 1545 95 room air -- -- --    08/24/22 1530 96 room air -- -- --    08/24/22 1515 96 room air -- -- --    08/24/22 1510 95 room air -- -- --    08/24/22 1219 96 room air -- -- --    08/24/22 0807 97 room air -- -- --    08/24/22 0418 97 room air -- -- --    08/24/22 0018 99 room air -- -- --    08/23/22 2126 -- room air -- -- --    08/23/22 2103 98 room air -- -- --    08/23/22 2021 97 -- -- -- --    08/23/22 1951 98 -- -- -- --    08/23/22 1949 97 -- -- -- --    08/23/22 1815 97 room air -- -- --        Intake & Output (last 2 days)       08/22 0701 08/23 0700 08/23 0701 08/24 0700 08/24 0701 08/25 0700    P.O.   120    I.V. (mL/kg)   500 (6.9)    Total Intake(mL/kg)   620 (8.5)    Net   +620               Lines, Drains & Airways     Active LDAs     Name Placement date Placement time Site Days    Peripheral IV 08/23/22 2014 Right Forearm 08/23/22 2014  Forearm  less than 1                Current Facility-Administered  Medications   Medication Dose Route Frequency Provider Last Rate Last Admin   • [MAR Hold] dextrose (D50W) (25 g/50 mL) IV injection 25 g  25 g Intravenous Q15 Min PRN Brandidaprisca, Micki, APRN       • [MAR Hold] dextrose (GLUTOSE) oral gel 15 g  15 g Oral Q15 Min PRN Qadah, Micki, APRN       • [MAR Hold] glucagon (human recombinant) (GLUCAGEN DIAGNOSTIC) injection 1 mg  1 mg Intramuscular Q15 Min PRN Qadah, Micki, APRN       • [MAR Hold] insulin lispro (ADMELOG) injection 0-9 Units  0-9 Units Subcutaneous TID AC Qadah, Micki, APRN       • lisinopril (PRINIVIL,ZESTRIL) tablet 40 mg  40 mg Oral Q24H Qadah, Micki, APRN   40 mg at 08/24/22 1124   • [MAR Hold] nitroglycerin (NITROSTAT) SL tablet 0.4 mg  0.4 mg Sublingual Q5 Min PRN Eduardo Briceño MD       • [MAR Hold] rosuvastatin (CRESTOR) tablet 20 mg  20 mg Oral Daily Qadah, Micki, APRN   20 mg at 08/23/22 2301   • [MAR Hold] sodium chloride 0.9 % flush 10 mL  10 mL Intravenous PRN Eduardo Briceño MD       • sodium chloride 0.9 % infusion  75 mL/hr Intravenous Continuous Tony Littlejohn MD 75 mL/hr at 08/24/22 1530 75 mL/hr at 08/24/22 1530   • [MAR Hold] tamsulosin (FLOMAX) 24 hr capsule 0.4 mg  0.4 mg Oral Daily Qadah, Micki, APRN   0.4 mg at 08/23/22 2300     Lab Results (last 48 hours)     Procedure Component Value Units Date/Time    POC Glucose Once [[id number]]  (Normal) Collected: 08/24/22 1528    Specimen: Blood Updated: 08/24/22 1529     Glucose 83 mg/dL      Comment: Meter: [id number] : [id number] Kassy APPLE RN       POC Activated Clotting Time [[id number]]  (Abnormal) Collected: 08/24/22 1441    Specimen: Blood Updated: 08/24/22 1506     Activated Clotting Time  300 Seconds      Comment: Serial Number: [id number]:  [id number]       POC Activated Clotting Time [[id number]]  (Abnormal) Collected: 08/24/22 1428    Specimen: Blood Updated: 08/24/22 1506     Activated Clotting Time  254 Seconds      Comment:  Serial Number: [id number]:  [id number]       POC Glucose Once [[id number]]  (Normal) Collected: 08/24/22 1159    Specimen: Blood Updated: 08/24/22 1201     Glucose 97 mg/dL      Comment: Meter: [id number] : [id number] Lavon RICHARDS       Lipid Panel [[id number]] Collected: 08/24/22 0520    Specimen: Blood Updated: 08/24/22 0606     Total Cholesterol 127 mg/dL      Triglycerides 82 mg/dL      HDL Cholesterol 42 mg/dL      LDL Cholesterol  69 mg/dL      VLDL Cholesterol 16 mg/dL      LDL/HDL Ratio 1.63    Narrative:      Cholesterol Reference Ranges  (U.S. Department of Health and Human Services ATP III Classifications)    Desirable          <200 mg/dL  Borderline High    200-239 mg/dL  High Risk          >240 mg/dL      Triglyceride Reference Ranges  (U.S. Department of Health and Human Services ATP III Classifications)    Normal           <150 mg/dL  Borderline High  150-199 mg/dL  High             200-499 mg/dL  Very High        >500 mg/dL    HDL Reference Ranges  (U.S. Department of Health and Human Services ATP III Classifications)    Low     <40 mg/dl (major risk factor for CHD)  High    >60 mg/dl ('negative' risk factor for CHD)        LDL Reference Ranges  (U.S. Department of Health and Human Services ATP III Classifications)    Optimal          <100 mg/dL  Near Optimal     100-129 mg/dL  Borderline High  130-159 mg/dL  High             160-189 mg/dL  Very High        >189 mg/dL    Basic Metabolic Panel [[id number]]  (Normal) Collected: 08/24/22 0520    Specimen: Blood Updated: 08/24/22 0606     Glucose 87 mg/dL      BUN 11 mg/dL      Creatinine 0.76 mg/dL      Sodium 138 mmol/L      Potassium 4.0 mmol/L      Chloride 104 mmol/L      CO2 24.9 mmol/L      Calcium 9.2 mg/dL      BUN/Creatinine Ratio 14.5     Anion Gap 9.1 mmol/L      eGFR 92.0 mL/min/1.73      Comment: National Kidney Foundation and American Society of Nephrology (ASN) Task Force recommended calculation based on the Chronic Kidney Disease  Epidemiology Collaboration (CKD-EPI) equation refit without adjustment for race.       Narrative:      GFR Normal >60  Chronic Kidney Disease <60  Kidney Failure <15      CBC (No Diff) [[id number]]  (Abnormal) Collected: 08/24/22 0520    Specimen: Blood Updated: 08/24/22 0549     WBC 5.41 10*3/mm3      RBC 4.42 10*6/mm3      Hemoglobin 12.8 g/dL      Hematocrit 39.2 %      MCV 88.7 fL      MCH 29.0 pg      MCHC 32.7 g/dL      RDW 13.3 %      RDW-SD 43.4 fl      MPV 10.2 fL      Platelets 150 10*3/mm3     Troponin [[id number]]  (Normal) Collected: 08/23/22 2050    Specimen: Blood Updated: 08/23/22 2117     Troponin T <0.010 ng/mL     Narrative:      Troponin T Reference Range:  <= 0.03 ng/mL-   Negative for AMI  >0.03 ng/mL-     Abnormal for myocardial necrosis.  Clinicians would have to utilize clinical acumen, EKG, Troponin and serial changes to determine if it is an Acute Myocardial Infarction or myocardial injury due to an underlying chronic condition.       Results may be falsely decreased if patient taking Biotin.      COVID PRE-OP / PRE-PROCEDURE SCREENING ORDER (NO ISOLATION) - Swab, Nasopharynx [[id number]]  (Normal) Collected: 08/23/22 2013    Specimen: Swab from Nasopharynx Updated: 08/23/22 2115    Narrative:      The following orders were created for panel order COVID PRE-OP / PRE-PROCEDURE SCREENING ORDER (NO ISOLATION) - Swab, Nasopharynx.  Procedure                               Abnormality         Status                     ---------                               -----------         ------                     COVID-19,[facility]...[[id number]]  Normal              Final result                 Please view results for these tests on the individual orders.    COVID-19,[facility] CEPHEID/[facility] NP SWAB IN TRANSPORT MEDIA 8-12 HR TAT - Swab, Nasopharynx [[id number]]  (Normal) Collected: 08/23/22 2013    Specimen: Swab from Nasopharynx Updated: 08/23/22 2115     COVID19 Not Detected    Narrative:       Fact sheet for providers: https://www.fda.gov/media/961345/download     Fact sheet for patients: https://www.fda.gov/media/206220/download    BNP [[id number]]  (Normal) Collected: 08/23/22 1850    Specimen: Blood Updated: 08/23/22 2053     proBNP 207.0 pg/mL     Narrative:      Among patients with dyspnea, NT-proBNP is highly sensitive for the detection of acute congestive heart failure. In addition NT-proBNP of <300 pg/ml effectively rules out acute congestive heart failure with 99% negative predictive value.    Results may be falsely decreased if patient taking Biotin.      Magnesium [[id number]]  (Normal) Collected: 08/23/22 1850    Specimen: Blood Updated: 08/23/22 2048     Magnesium 1.9 mg/dL     Protime-INR [[id number]]  (Abnormal) Collected: 08/23/22 1850    Specimen: Blood Updated: 08/23/22 2004     Protime 14.3 Seconds      INR 1.12    aPTT [[id number]]  (Normal) Collected: 08/23/22 1850    Specimen: Blood Updated: 08/23/22 2004     PTT 32.1 seconds     [place] Draw [[id number]] Collected: 08/23/22 1850    Specimen: Blood Updated: 08/23/22 2002    Narrative:      The following orders were created for panel order [place] Draw.  Procedure                               Abnormality         Status                     ---------                               -----------         ------                     Green Top (Gel)[[id number]]                                  Final result               Lavender Top[[id number]]                                     Final result               Gold Top - SST[[id number]]                                   Final result               Light Blue Top[[id number]]                                   Final result                 Please view results for these tests on the individual orders.    Green Top (Gel) [[id number]] Collected: 08/23/22 1850    Specimen: Blood Updated: 08/23/22 2002     Extra Tube Hold for add-ons.     Comment: Auto resulted.       Lavender Top [[id number]] Collected: 08/23/22 1850     Specimen: Blood Updated: 08/23/22 2002     Extra Tube hold for add-on     Comment: Auto resulted       Gold Top - SST [[id number]] Collected: 08/23/22 1850    Specimen: Blood Updated: 08/23/22 2002     Extra Tube Hold for add-ons.     Comment: Auto resulted.       Light Blue Top [[id number]] Collected: 08/23/22 1850    Specimen: Blood Updated: 08/23/22 2002     Extra Tube Hold for add-ons.     Comment: Auto resulted       Comprehensive Metabolic Panel [[id number]]  (Abnormal) Collected: 08/23/22 1850    Specimen: Blood Updated: 08/23/22 1928     Glucose 114 mg/dL      BUN 13 mg/dL      Creatinine 1.18 mg/dL      Sodium 138 mmol/L      Potassium 4.4 mmol/L      Chloride 101 mmol/L      CO2 25.3 mmol/L      Calcium 9.6 mg/dL      Total Protein 7.5 g/dL      Albumin 4.50 g/dL      ALT (SGPT) 17 U/L      AST (SGOT) 21 U/L      Alkaline Phosphatase 64 U/L      Total Bilirubin 0.5 mg/dL      Globulin 3.0 gm/dL      A/G Ratio 1.5 g/dL      BUN/Creatinine Ratio 11.0     Anion Gap 11.7 mmol/L      eGFR 63.2 mL/min/1.73      Comment: National Kidney Foundation and American Society of Nephrology (ASN) Task Force recommended calculation based on the Chronic Kidney Disease Epidemiology Collaboration (CKD-EPI) equation refit without adjustment for race.       Narrative:      GFR Normal >60  Chronic Kidney Disease <60  Kidney Failure <15      Troponin [[id number]]  (Normal) Collected: 08/23/22 1850    Specimen: Blood Updated: 08/23/22 1918     Troponin T <0.010 ng/mL     Narrative:      Troponin T Reference Range:  <= 0.03 ng/mL-   Negative for AMI  >0.03 ng/mL-     Abnormal for myocardial necrosis.  Clinicians would have to utilize clinical acumen, EKG, Troponin and serial changes to determine if it is an Acute Myocardial Infarction or myocardial injury due to an underlying chronic condition.       Results may be falsely decreased if patient taking Biotin.      CBC & Differential [[id number]]  (Abnormal) Collected: 08/23/22 1850     Specimen: Blood Updated: 08/23/22 1859    Narrative:      The following orders were created for panel order CBC & Differential.  Procedure                               Abnormality         Status                     ---------                               -----------         ------                     CBC Auto Differential[[id number]]        Abnormal            Final result                 Please view results for these tests on the individual orders.    CBC Auto Differential [[id number]]  (Abnormal) Collected: 08/23/22 1850    Specimen: Blood Updated: 08/23/22 1859     WBC 8.36 10*3/mm3      RBC 4.86 10*6/mm3      Hemoglobin 14.1 g/dL      Hematocrit 42.3 %      MCV 87.0 fL      MCH 29.0 pg      MCHC 33.3 g/dL      RDW 13.6 %      RDW-SD 42.6 fl      MPV 10.2 fL      Platelets 168 10*3/mm3      Neutrophil % 68.2 %      Lymphocyte % 24.9 %      Monocyte % 4.8 %      Eosinophil % 1.1 %      Basophil % 0.5 %      Immature Grans % 0.5 %      Neutrophils, Absolute 5.71 10*3/mm3      Lymphocytes, Absolute 2.08 10*3/mm3      Monocytes, Absolute 0.40 10*3/mm3      Eosinophils, Absolute 0.09 10*3/mm3      Basophils, Absolute 0.04 10*3/mm3      Immature Grans, Absolute 0.04 10*3/mm3      nRBC 0.0 /100 WBC           Imaging Results (Last 48 Hours)     Procedure Component Value Units Date/Time    XR Chest 2 View [[id number]] Collected: 08/23/22 1908     Updated: 08/23/22 1915    Narrative:      XR CHEST 2 VW-     HISTORY:  Chest pain, history of CABG x3.     COMPARISON:  None     FINDINGS:    Two views of the chest were obtained.     Support Devices:  None.  Cardiac Silhouette/Mediastinum/Livia:  The cardiac silhouette is normal  in size. There is at least one coronary artery stent. There is calcific  aortic atherosclerosis. Mediastinal and hilar contours are within normal  limits.  Lungs/Pleural Spaces:  There is no focal consolidation or effusion.  There is a somewhat nodular opacity projecting over the lower right lung  base on  the frontal view, which may reflect a pulmonary nodule and is  incompletely assessed. Recommend comparison with prior imaging, if  available, otherwise recommend follow-up CT chest.  Chest Wall/Diaphragm/Upper Abdomen:  Median sternotomy wires are intact.  There is calcific atherosclerosis in the upper abdomen.     This report was finalized on 8/23/2022 7:12 PM by Dr. Mansi Connolly M.D.           ECG/EMG Results (last 48 hours)     Procedure Component Value Units Date/Time    ECG 12 Lead [[id number]] Collected: 08/23/22 1830     Updated: 08/23/22 1929     QT Interval 380 ms     Narrative:      HEART RATE= 72  bpm  RR Interval= 832  ms  AK Interval= 174  ms  P Horizontal Axis= 41  deg  P Front Axis= 68  deg  QRSD Interval= 95  ms  QT Interval= 380  ms  QRS Axis= -31  deg  T Wave Axis= 94  deg  - ABNORMAL ECG -  Sinus rhythm  Left axis deviation  NO PRIOR TRACING AVAILABLE FOR COMPARISON  Electronically Signed By: Tony Littlejohn ([facility]) 23-Aug-2022 19:29:21  Date and Time of Study: 2022-08-23 18:30:26    Adult Transthoracic Echo Complete W/ Cont if Necessary Per Protocol [[id number]] Resulted: 08/24/22 1226     Updated: 08/24/22 1231     Target HR (85%) 121 bpm      Max. Pred. HR (100%) 142 bpm      LA ESV Index (BP) 23.6 ml/m2      ACS 1.69 cm      Ao root diam 3.4 cm      Ao pk bang 115.0 cm/sec      Ao V2 VTI 24.2 cm      PHONG(I,D) 3.0 cm2      EDV(cubed) 86.2 ml      EDV(MOD-sp2) 70.0 ml      EDV(MOD-sp4) 133.0 ml      EF(MOD-bp) 56.0 %      EF(MOD-sp2) 54.3 %      EF(MOD-sp4) 54.9 %      ESV(cubed) 52.1 ml      ESV(MOD-sp2) 32.0 ml      ESV(MOD-sp4) 60.0 ml      IVS/LVPW 1.01 cm      LV mass(C)d 147.4 grams      LV V1 max PG 2.7 mmHg      LV V1 mean PG 1.50 mmHg      LV V1 max 81.9 cm/sec      LVPWd 0.99 cm      MV dec slope 201.1 cm/sec2      MV dec time 0.25 msec      MV P1/2t 98.7 msec      MV V2 VTI 28.3 cm      MVA(VTI) 2.6 cm2      PA V2 max 136.4 cm/sec      Pulm A Revs Bang 76.0 cm/sec      RAP systole 3.0  mmHg      RV V1 max PG 1.33 mmHg      RV V1 max 57.8 cm/sec      RV V1 VTI 14.6 cm      RVSP(TR) 21.9 mmHg      SV(LVOT) 72.5 ml      SV(MOD-sp2) 38.0 ml      SV(MOD-sp4) 73.0 ml      TR max PG 18.9 mmHg      Ao max PG 5.3 mmHg      Ao mean PG 2.49 mmHg      FS 15.5 %      IVSd 1.00 cm      LV V1 VTI 25.0 cm      LVIDd 4.4 cm      LVIDs 3.7 cm      LVOT area 2.9 cm2      LVOT diam 1.92 cm      MV E/A 0.69     MV mean PG 1.48 mmHg      MVA(P1/2t) 2.23 cm2      Pulm S/D 1.93     MV A dur 0.12 sec      MV A max bang 90.3 cm/sec      MV E max bang 62.2 cm/sec      Pulm A Revs Dur 0.13 sec      Pulm Rosado Bang 42.0 cm/sec      Pulm Sys Bang 80.9 cm/sec      TR max bang 217.2 cm/sec      Echo EF Estimated 56 %     Narrative:      · The study is technically difficult for diagnosis.  · Estimated right ventricular systolic pressure from tricuspid   regurgitation is normal (<35 mmHg). Calculated right ventricular systolic   pressure from tricuspid regurgitation is 21.9 mmHg.  · Estimated left ventricular EF = 56% Left ventricular systolic function   is normal.  · Left ventricular diastolic function was normal.  · The following left ventricular wall segments are hypokinetic: apical   septal, basal inferoseptal, mid inferoseptal, mid anteroseptal and basal   inferoseptal.       ECG 12 Lead [[id number]] Collected: 08/24/22 1539     Updated: 08/24/22 1554     QT Interval 397 ms     Narrative:      HEART RATE= 68  bpm  RR Interval= 876  ms  MD Interval= 184  ms  P Horizontal Axis= 0  deg  P Front Axis= 67  deg  QRSD Interval= 97  ms  QT Interval= 397  ms  QRS Axis= -49  deg  T Wave Axis=   deg  - ABNORMAL ECG -  Sinus rhythm  LAD, consider left anterior fascicular block  Anteroseptal infarct, age indeterminate  No change from prior tracing  Electronically Signed By: Solange Zamora ([facility]) 24-Aug-2022 15:54:05  Date and Time of Study: 2022-08-24 15:39:38          Orders (last 48 hrs)      Start     Ordered    08/24/22 1557  Inpatient  Admission  Once         08/24/22 1556    08/24/22 1541  POC Activated Clotting Time  PROCEDURE ONCE         08/24/22 1506    08/24/22 1530  POC Glucose Once  PROCEDURE ONCE         08/24/22 1528    08/24/22 1528  POC Activated Clotting Time  PROCEDURE ONCE         08/24/22 1506    08/24/22 1523  Obtain STAT EKG during chest pain. Notify MD of any change in rhythm, ST segments or complaints of chest pain.  Until Discontinued         08/24/22 1522    08/24/22 1523  Encourage fluids  Until Discontinued         08/24/22 1522    08/24/22 1523  ECG 12 Lead  STAT         08/24/22 1522    08/24/22 1523  Notify MD if platelet count is less than 100,000, is less than 1/2 baseline, or if Hgb drops by more than 3mg/dl.  Until Discontinued         08/24/22 1522    08/24/22 1523  Notify MD of hypotension (SBP less than 95), bleeding, or dysrythmia and follow Sheath Removal Policy if needed.  Until Discontinued         08/24/22 1522    08/24/22 1523  If patient on Metformin (Glucophage) hold for 48 hours.  Until Discontinued         08/24/22 1522    08/24/22 1523  Closure Device Used  Once         08/24/22 1522    08/24/22 1523  Assess Puncture Site, Vital Signs, Distal Pulses & Observe Site for Bleeding or Swelling  Per Order Details        Comments: Every 15 Minutes x4, Every 30 Minutes x4, & Every 1 Hour x2    08/24/22 1522    08/24/22 1507  POC Activated Clotting Time  PROCEDURE ONCE         08/24/22 1428    08/24/22 1507  POC Activated Clotting Time  PROCEDURE ONCE         08/24/22 1441    08/24/22 1501  aspirin tablet  As Needed,   Status:  Discontinued         08/24/22 1503    08/24/22 1501  clopidogrel (PLAVIX) tablet  As Needed,   Status:  Discontinued         08/24/22 1501    08/24/22 1456  iopamidol (ISOVUE-370) 76 % injection  As Needed,   Status:  Discontinued         08/24/22 1456    08/24/22 1416  heparin (porcine) injection  As Needed,   Status:  Discontinued         08/24/22 1416    08/24/22 1339  lidocaine  (XYLOCAINE) 2% injection  As Needed,   Status:  Discontinued         08/24/22 1339    08/24/22 1314  midazolam (VERSED) injection  As Needed,   Status:  Discontinued         08/24/22 1314    08/24/22 1313  fentaNYL citrate (PF) (SUBLIMAZE) injection  As Needed,   Status:  Discontinued         08/24/22 1314    08/24/22 1202  POC Glucose Once  PROCEDURE ONCE         08/24/22 1159    08/24/22 1145  sodium chloride 0.9 % infusion  Continuous         08/24/22 1045    08/24/22 1125  Transfer Patient  Once         08/24/22 1124    08/24/22 1052  Cardiac Catheterization/Vascular Study  Once         08/24/22 1051    08/24/22 1045  Obtain Informed Consent  Once        Comments: Obtain consent for: left heart catheterization with possible percutaneous coronary intervention, with possible stent placement, with possible closure device.    08/24/22 1045    08/24/22 1044  Case Request Cath Lab: Left Heart Cath  Once         08/24/22 1045    08/24/22 0945  perflutren (DEFINITY) 8.476 mg in Sodium Chloride (PF) 0.9 % 10 mL injection  Once in Imaging         08/24/22 0859    08/24/22 0900  rosuvastatin (CRESTOR) tablet 20 mg  Daily,   Status:  Discontinued         08/23/22 2138 08/24/22 0900  tamsulosin (FLOMAX) 24 hr capsule 0.4 mg  Daily,   Status:  Discontinued         08/23/22 2138 08/24/22 0900  lisinopril (PRINIVIL,ZESTRIL) tablet 40 mg  Every 24 Hours Scheduled         08/23/22 2138 08/24/22 0800  Oral Care  2 Times Daily       08/23/22 2005 08/24/22 0730  [MAR Hold]  insulin lispro (ADMELOG) injection 0-9 Units  3 Times Daily Before Meals        (MAR Hold since Wed 8/24/2022 at 1309.Hold Reason: Unreviewed Transfer Orders.)    08/23/22 2058 08/24/22 0700  POC Glucose TID AC  3 Times Daily Before Meals       08/23/22 2058 08/24/22 0612  Inpatient Cardiology Consult  Once        Specialty:  Cardiology  Provider:  Tony Littlejohn MD    08/24/22 0611    08/24/22 0600  Basic Metabolic Panel  Morning Draw          08/23/22 2005 08/24/22 0600  CBC (No Diff)  Morning Draw         08/23/22 2005 08/24/22 0600  Lipid Panel  Morning Draw         08/23/22 2005 08/24/22 0001  NPO Diet NPO Type: Sips with Meds  Diet Effective Midnight         08/23/22 2006 08/24/22 0000  Vital Signs  Every 4 Hours       08/23/22 2005 08/24/22 0000  Ambulatory Referral to Cardiac Rehab         08/24/22 1504    08/23/22 2345  [MAR Hold]  rosuvastatin (CRESTOR) tablet 20 mg  Daily        (MAR Hold since Wed 8/24/2022 at 1309.Hold Reason: Unreviewed Transfer Orders.)    08/23/22 2247 08/23/22 2345  [MAR Hold]  tamsulosin (FLOMAX) 24 hr capsule 0.4 mg  Daily        (MAR Hold since Wed 8/24/2022 at 1309.Hold Reason: Unreviewed Transfer Orders.)    08/23/22 2247 08/23/22 2059  Do NOT Hold Basal or Correction Scale Insulin When Patient is NPO, Hold Scheduled Mealtime (Bolus) Insulin if NPO  Continuous         08/23/22 2058 08/23/22 2058  [MAR Hold]  dextrose (GLUTOSE) oral gel 15 g  Every 15 Minutes PRN        (MAR Hold since Wed 8/24/2022 at 1309.Hold Reason: Unreviewed Transfer Orders.)    08/23/22 2058 08/23/22 2058  [MAR Hold]  dextrose (D50W) (25 g/50 mL) IV injection 25 g  Every 15 Minutes PRN        (MAR Hold since Wed 8/24/2022 at 1309.Hold Reason: Unreviewed Transfer Orders.)    08/23/22 2058 08/23/22 2058  [MAR Hold]  glucagon (human recombinant) (GLUCAGEN DIAGNOSTIC) injection 1 mg  Every 15 Minutes PRN        (MAR Hold since Wed 8/24/2022 at 1309.Hold Reason: Unreviewed Transfer Orders.)    08/23/22 2058 08/23/22 2007  Diet Regular  Diet Effective Now,   Status:  Canceled         08/23/22 2006 08/23/22 2005  Oxygen Therapy- Nasal Cannula; Titrate for SPO2: 90% - 95%  Continuous         08/23/22 2005 08/23/22 2005  Vital Signs Every 15 Minutes Until Stable, Then Every 4 Hours  Continuous         08/23/22 2005 08/23/22 2005  Cardiac Monitoring  Continuous         08/23/22 2005 08/23/22 2005  Pulse  Oximetry, Continuous  Continuous         08/23/22 2005 08/23/22 2005  Notify Physician For Unrelieved Chest Pain  Until Discontinued         08/23/22 2005 08/23/22 2005  NPO Diet NPO Type: Sips with meds  Diet Effective Now,   Status:  Canceled         08/23/22 2005 08/23/22 2005  Nurse to Order Troponin As Needed For Unrelieved or New Chest Pain  Continuous        Comments: Nurse to Order Troponin As Needed For Unrelieved or New Chest Pain    08/23/22 2005 08/23/22 2005  Inpatient Cardiology Consult  Once,   Status:  Canceled        Specialty:  Cardiology  Provider:  (Not yet assigned)    08/23/22 2005 08/23/22 2005  Code Status and Medical Interventions:  Continuous         08/23/22 2005 08/23/22 2005  Place Sequential Compression Device  Once         08/23/22 2005 08/23/22 2005  Maintain Sequential Compression Device  Continuous         08/23/22 2005 08/23/22 2005  Troponin  Once,   Status:  Canceled         08/23/22 2005 08/23/22 2005  Adult Transthoracic Echo Complete W/ Cont if Necessary Per Protocol  Once         08/23/22 2005 08/23/22 2004  Initiate ED Observation Status  Once         08/23/22 2003 08/23/22 2000  aspirin chewable tablet 162 mg  Once         08/23/22 1958 08/23/22 1959  COVID PRE-OP / PRE-PROCEDURE SCREENING ORDER (NO ISOLATION) - Swab, Nasopharynx  Once         08/23/22 1958 08/23/22 1959  COVID-19,[facility] CEPHEID/[facility] NP SWAB IN TRANSPORT MEDIA 8-12 HR TAT - Swab, Nasopharynx  PROCEDURE ONCE         08/23/22 1958 08/23/22 1958  [MAR Hold]  nitroglycerin (NITROSTAT) SL tablet 0.4 mg  Every 5 Minutes PRN        (MAR Hold since Wed 8/24/2022 at 1309.Hold Reason: Unreviewed Transfer Orders.)    08/23/22 1958 08/23/22 1947  Protime-INR  Once         08/23/22 1946 08/23/22 1947  aPTT  Once         08/23/22 1946 08/23/22 1947  BNP  Once         08/23/22 1946 08/23/22 1947  Magnesium  Once         08/23/22 1946 08/23/22 1819  NPO Diet  NPO Type: Strict NPO  Diet Effective Now,   Status:  Canceled         08/23/22 1818    08/23/22 1819  Undress and Gown  Once         08/23/22 1818 08/23/22 1819  Cardiac Monitoring  Continuous,   Status:  Canceled         08/23/22 1818    08/23/22 1819  Continuous Pulse Oximetry  Continuous,   Status:  Canceled         08/23/22 1818    08/23/22 1819  XR Chest 2 View  1 Time Imaging         08/23/22 1818 08/23/22 1819  Insert Peripheral IV  Once         08/23/22 1818    08/23/22 1819  [place] Draw  Once         08/23/22 1818    08/23/22 1819  CBC & Differential  Once         08/23/22 1818 08/23/22 1819  Comprehensive Metabolic Panel  Once         08/23/22 1818 08/23/22 1819  Troponin  Now Then Every 2 Hours       08/23/22 1818 08/23/22 1819  Green Top (Gel)  PROCEDURE ONCE         08/23/22 1818 08/23/22 1819  Lavender Top  PROCEDURE ONCE         08/23/22 1818 08/23/22 1819  Gold Top - SST  PROCEDURE ONCE         08/23/22 1818 08/23/22 1819  Light Blue Top  PROCEDURE ONCE         08/23/22 1818 08/23/22 1819  CBC Auto Differential  PROCEDURE ONCE         08/23/22 1818 08/23/22 1818  Oxygen Therapy- Nasal Cannula; 2 LPM; Titrate for SPO2: equal to or greater than, 92%  Continuous PRN,   Status:  Canceled       08/23/22 1818 08/23/22 1818  [MAR Hold]  sodium chloride 0.9 % flush 10 mL  As Needed        (MAR Hold since Wed 8/24/2022 at 1309.Hold Reason: Unreviewed Transfer Orders.)    08/23/22 1818 08/23/22 1817  ECG 12 Lead  Once         08/23/22 1816    Unscheduled  ECG 12 Lead  As Needed       08/23/22 1818    Unscheduled  ECG 12 Lead  As Needed      Comments: Nurse to Release ECG Order for Unrelieved or New Chest Pain    08/23/22 2005    Unscheduled  Follow Hypoglycemia Standing Orders (Blood Glucose <70)  As Needed      Comments: Follow Protocol As Outlined in Process Instructions (Open Order Report to View Full Instructions)    08/23/22 2058    Unscheduled  Vital Signs  As Needed   "     08/24/22 1522    Unscheduled  Change site dressing  As Needed       08/24/22 1522    Unscheduled  Head of Bed: Flat; 2 Hours; Elevate Head of Bed: 30 Degrees; 2 Hours; Keep Affected Extremity/ Extremities Straight; Total Bedrest Time? 2 Hours  As Needed       08/24/22 1522    Signed and Held  Oxygen Therapy- Nasal Cannula; Titrate for SPO2: equal to or greater than, 92%, per policy  Continuous         Signed and Held    Signed and Held  Continuous Pulse Oximetry  Continuous         Signed and Held    Signed and Held  Diet Regular; Cardiac  Diet Effective Now         Signed and Held    Signed and Held  Advance diet as tolerated  Until Discontinued         Signed and Held    Signed and Held  ECG 12 Lead  Once         Signed and Held    Signed and Held  CBC (No Diff)  Morning Draw         Signed and Held    Signed and Held  Basic Metabolic Panel  Morning Draw         Signed and Held    Signed and Held  Hemoglobin A1c  Morning Draw         Signed and Held    Signed and Held  Lipid Panel  Morning Draw        Comments: Fasting      Signed and Held    Signed and Held  sodium chloride 0.9 % infusion  Continuous         Signed and Held    Signed and Held  clopidogrel (PLAVIX) tablet 75 mg  Daily         Signed and Held    Signed and Held  atorvastatin (LIPITOR) tablet 40 mg  Nightly         Signed and Held    Signed and Held  acetaminophen (TYLENOL) tablet 650 mg  Every 4 Hours PRN         Signed and Held    Signed and Held  HYDROcodone-acetaminophen (NORCO) 5-325 MG per tablet 1 tablet  Every 4 Hours PRN         Signed and Held    Signed and Held  ondansetron (ZOFRAN) tablet 4 mg  Every 6 Hours PRN        \"Or\" Linked Group Details    Signed and Held    Signed and Held  ondansetron (ZOFRAN) injection 4 mg  Every 6 Hours PRN        \"Or\" Linked Group Details    Signed and Held    Signed and Held  aspirin EC tablet 81 mg  Daily         Signed and Held                 Physician Progress Notes (last 48 hours)      Matias" BEN Chandra at 22 0928     Attestation signed by Pascual Brown II, MD at 22 1056      MD ATTESTATION NOTE    The MARCUS and I have discussed this patient's history, physical exam, and treatment plan.  I have reviewed the documentation and personally had a face to face interaction with the patient. I affirm the documentation and agree with the treatment and plan.  The attached note describes my personal findings.      I provided a substantive portion of the care of the patient.  I personally performed the physical exam in its entirety, and below are my findings.  For this patient encounter, the patient wore surgical mask, I wore full protective PPE including N95 and eye protection.      Brief HPI: Patient presents complaining of chest pain.  Onset yesterday.  There is already to 3 nitroglycerin.  He denies have any recurrent pain.  He is currently asymptomatic.    PHYSICAL EXAM  ED Triage Vitals   Temp Heart Rate Resp BP SpO2   22 18122   98.2 °F (36.8 °C) 80 16 138/70 97 %      Temp src Heart Rate Source Patient Position BP Location FiO2 (%)   22 18122 18122 --   Tympanic Monitor Lying Left arm          GENERAL: no acute distress  HENT: nares patent  EYES: no scleral icterus  CV: regular rhythm, normal rate, 2+ radial pulses bilaterally  RESPIRATORY: normal effort, clear to auscultation bilaterally  ABDOMEN: soft, nontender  MUSCULOSKELETAL: no deformity  NEURO: alert, moves all extremities, follows commands  PSYCH:  calm, cooperative  SKIN: warm, dry    Vital signs and nursing notes reviewed.        Plan:   -Cardiology plans to take to the Cath Lab                     [facility]     Progress Note    Name: Kerrie Tyler ADMIT: 2022   : 1945  PCP: Mayito Sepulveda MD    MRN: [id number] LOS: 0 days   AGE/SEX: 77 y.o. male  ROOM: [facility]     Subjective   Subjective     Subjective    Patient reports chest pain began yesterday and resolved after 3 nitro. He denies recurrent pain. No shortness of breath or nausea.     Review of Systems   All other systems reviewed and are negative.        Objective   Objective     Objective   Vital Signs  Temp:  [97.9 °F (36.6 °C)-98.3 °F (36.8 °C)] 98.3 °F (36.8 °C)  Heart Rate:  [53-80] 70  Resp:  [16-18] 16  BP: ()/(52-70) 108/58  SpO2:  [97 %-99 %] 97 %  on   ;   Device (Oxygen Therapy): room air  Body mass index is 25.82 kg/m².  Physical Exam  Vitals and nursing note reviewed.   Constitutional:       General: He is not in acute distress.     Appearance: Normal appearance. He is normal weight.   HENT:      Head: Normocephalic and atraumatic.      Nose: Nose normal.      Mouth/Throat:      Mouth: Mucous membranes are moist.   Eyes:      Extraocular Movements: Extraocular movements intact.   Cardiovascular:      Rate and Rhythm: Normal rate and regular rhythm.      Pulses: Normal pulses.      Heart sounds: Normal heart sounds.   Pulmonary:      Effort: Pulmonary effort is normal. No respiratory distress.      Breath sounds: Normal breath sounds.   Abdominal:      General: Abdomen is flat. Bowel sounds are normal.      Palpations: Abdomen is soft.   Musculoskeletal:         General: No swelling. Normal range of motion.      Cervical back: Normal range of motion. No rigidity.   Skin:     General: Skin is warm and dry.   Neurological:      General: No focal deficit present.      Mental Status: He is alert and oriented to person, place, and time. Mental status is at baseline.   Psychiatric:         Mood and Affect: Mood normal.         Behavior: Behavior normal.         Thought Content: Thought content normal.         Judgment: Judgment normal.     Results Review     I reviewed the patient's new clinical results.      No results found for: HGBA1C, POCGLU    XR Chest 2 View  XR CHEST 2 VW-     HISTORY:  Chest pain, history of CABG x3.     COMPARISON:  None      FINDINGS:    Two views of the chest were obtained.     Support Devices:  None.  Cardiac Silhouette/Mediastinum/Livia:  The cardiac silhouette is normal  in size. There is at least one coronary artery stent. There is calcific  aortic atherosclerosis. Mediastinal and hilar contours are within normal  limits.  Lungs/Pleural Spaces:  There is no focal consolidation or effusion.  There is a somewhat nodular opacity projecting over the lower right lung  base on the frontal view, which may reflect a pulmonary nodule and is  incompletely assessed. Recommend comparison with prior imaging, if  available, otherwise recommend follow-up CT chest.  Chest Wall/Diaphragm/Upper Abdomen:  Median sternotomy wires are intact.  There is calcific atherosclerosis in the upper abdomen.     This report was finalized on 8/23/2022 7:12 PM by Dr. Mansi Connolly M.D.       Scheduled Medications  insulin lispro, 0-9 Units, Subcutaneous, TID AC  lisinopril, 40 mg, Oral, Q24H  rosuvastatin, 20 mg, Oral, Daily  tamsulosin, 0.4 mg, Oral, Daily    Infusions   Diet  NPO Diet NPO Type: Sips with Meds        Assessment/Plan   Assessment / Plan       Active Hospital Problems    Diagnosis  POA   • Chest pain [R07.9]  Yes      Resolved Hospital Problems   No resolved problems to display.       77 y.o. male admitted with chest pain.     Chest pain/CAD  -Initial troponin negative, trend  -EKG shows sinus rhythm with a rate of 72, T wave inversion in leads V1- V3  -Cardiology consult  -Telemetry monitoring  -Patient has received aspirin and nitroglycerin while in the ED  -Echo in a.m.     DM2  -Accu-Chek AC at bedtime  -Moderate insulin sliding scale     Hypertension hyperlipidemia  -Chronic conditions, continue medications  -Lipid panel pending  -Vital signs per nursing      · SCDs for DVT prophylaxis.  · Full code.  · Discussed with patient.  · Anticipate discharge today.    This progress note will additionally serve as discharge summary.     Deborah KHAN  BEN Salcedo  [facility]  08/24/22  09:28 EDT             Electronically signed by Pascual Brown II, MD at 08/24/22 1056     James Mancilla MD at 08/23/22 2202        Pt presents to the ED c/o  intermittent sharp chest pains in the left side of his chest lasting for 30 to 60 minutes starting this morning around 3 AM.  Patient does have a history of coronary disease with CABG back in 1994.  Currently no symptoms and pain-free.     On exam,   General: No acute distress, nontoxic, nondiaphoretic  HEENT: Mucous membranes moist, atraumatic, EOMI  Neck: Full ROM  Pulm: Symmetric chest rise, nonlabored, lungs CTAB  Cardiovascular: Regular rate and rhythm, intact distal pulses  GI: Soft, nontender, nondistended, no rebound, no guarding, bowel sounds present  MSK: Full ROM, no deformity  Skin: Warm, dry  Neuro: Awake, alert, oriented x 4, GCS 15, moving all extremities, no focal deficits  Psych: Calm, cooperative      N95, protective eye goggles, and gloves used during this encounter. Patient in surgical mask.      Plan: Plan for cardiology evaluation the morning, echocardiogram ordered.  Patient in no acute distress without active pain at this time.  Does have a history of coronary disease.  Symptoms are a bit atypical.  All questions and concerns addressed.       Attestation:  The MARCUS and I have discussed this patient's history, physical exam, and treatment plan.  I have reviewed the documentation and personally had a face to face interaction with the patient. I affirm the documentation and agree with the treatment and plan.  The attached note describes my personal findings.           Electronically signed by James Mancilla MD at 08/23/22 2203          Consult Notes (last 48 hours)      Tony Littlejohn MD at 08/24/22 0823      Consult Orders    1. Inpatient Cardiology Consult [[id number]] ordered by Micki Singh APRN at 08/24/22 0611                        Patient  Name: Kerrie Tyler  Age/Sex: 77 y.o. male  : 1945  MRN: [id number]    Date of Admission: 2022  Date of Encounter Visit: 22  Encounter Provider: Tony Littlejohn MD  Place of Service: [facility] CARDIOLOGY      Referring Provider: James Mancilla MD  Patient Care Team:  Mayito Sepulveda MD as PCP - General (Family Medicine)    Subjective:     Consulted for: Chest Pain      Chief Complaint: Chest Pain        History of Present Illness:  77 y.o. male patient with a history of coronary artery disease (s/p MI and CABG in ), hypertension, diabetes and kidney stones.  Of note the patient states that after his bypass he had chest pain that week and they took him back for catheterization showing that his vein graft to the OM was occluded.  He also reported that he had a AJAY and a LIMA.    He presented to the ED last night with intermittent chest pain that started Monday night.  He described the pain as lefts sided pressure that radiated in to his left arm and neck.  He did take NTG at home and improved his symptoms.     On arrival his EKG showed NSR and T wave inversion.  He received  mg.  Labs included troponin negative x 2.     We have been consulted for chest pain.  He moved from [place] a couple years ago and has not followed with cardiology since his move.  He has been chest pain free overnight.   ECHO has been performed and on my review shows an ejection fraction of approximately 40% along with distal anteroapical dyskinesis and septal dyskinesis.    He states that his chest pain is different than it has been in the past.  He has not had much of an issue with angina previously been states that he actually had to take multiple doses of sublingual nitroglycerin prior to this presentation.  He stated that this resolved the pain that he had recurrence.    Previous testing:   No Previous Cardiac Testing found     Allergies:  Allergies   Allergen  "Reactions   • Sulfa Antibiotics Rash       Review of Systems:  All systems reviewed. Pertinent positives identified in HPI. All other systems are negative.       Objective:     Objective:  Temp:  [97.9 °F (36.6 °C)-98.3 °F (36.8 °C)] 98.3 °F (36.8 °C)  Heart Rate:  [53-80] 70  Resp:  [16-18] 16  BP: ()/(52-70) 108/58  No intake or output data in the 24 hours ending 08/24/22 0941  Body mass index is 25.82 kg/m².      08/23/22 1953 08/24/22 0858   Weight: 72.6 kg (160 lb) 72.6 kg (160 lb)       Physical Exam:   Temp:  [97.9 °F (36.6 °C)-98.3 °F (36.8 °C)] 98.3 °F (36.8 °C)  Heart Rate:  [53-80] 70  Resp:  [16-18] 16  BP: ()/(52-70) 108/58  No intake or output data in the 24 hours ending 08/24/22 0941  Flowsheet Rows    Flowsheet Row First Filed Value   Admission Height 167.6 cm (66\") Documented at 08/23/2022 1953   Admission Weight 72.6 kg (160 lb) Documented at 08/23/2022 1953          General Appearance:    Alert, cooperative, in no acute distress   Head:    Normocephalic, without obvious abnormality, atraumatic       Neck/Lymph   No adenopathy, supple, no thyromegaly, no carotid bruit, no    JVD   Lungs:     Clear to auscultation bilaterally, no wheezes, rales, or     rhonchi    Cardiac:    Normal rate, regular rhythm, no murmur, no rub, no gallop   Chest Wall:    Sternotomy   GI:     Normal bowel sounds, soft, nontender, nondistended,            no rebound tenderness   Extremities:   No cyanosis, clubbing, or edema   Circulatory/Peripheral Vascular :   Pulses palpable and equal bilaterally   Integumentary:   No bleeding or rash. Normal temperature       Neurologic:   Cranial nerves 2 - 12 grossly intact, sensation intact           EKG:         BASELINE:     No Prior EKG for comparison     I personally viewed and interpreted the patient's EKG/Telemetry data.    Assessment:   Assessment & Plan   1.  Coronary artery disease with prior CABG: Report of prior AJAY and LIMA  2.  Unstable angina  3.  " Ischemic cardiomyopathy  4.  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  He has had recurrent episodes of chest discomfort requiring multiple doses of sublingual nitroglycerin.  - His electrocardiogram does not look any worse than it has prior.  His cardiac biomarkers are negative.  -I do not think stress test would be appropriate in the scenario with his multiple wall motion abnormalities, ischemic cardiomyopathy and now recurrent chest pain.  Catheterization has been recommended with a femoral artery access.      Thank you for allowing me to participate in the care of Kerrie Tyler. Feel free to contact me directly with any further questions or concerns.    Tony Littlejohn MD  [facility]  22  09:41 EDT    Electronically signed by Tony Littlejohn MD at 22 1008         [facility] CATH LAB  [address] [address]  [phone]          Kerrie Tyler  Cardiac Catheterization/Vascular Study  Order# [id number]  Reading physician: Jam Gavin MD Ordering physician: Tony Littlejohn MD Study date: 22      Procedures    RESTING FULL CYCLE RATIO   Coronary angiography   Left ventriculography   Left Heart Cath  FEMORAL ACCESS   Native mammary injection   Stent LIZBET bypass graft   Percutaneous Coronary Intervention        Patient Information    Patient Name   Kerrie Tyler MRN   [id number] Legal Sex   Male  (Age)   1945 (77 y.o.)   Race Ethnicity Encounter Category   White or  Not  or  Emergency        ISCV PACS Images     Show images for Cardiac Catheterization/Vascular Study     Printable Vessel Diagram    Printable Vessel Diagram     Physicians    Panel Physicians Referring Physician Case Authorizing Physician   Romaine  MD Jam (Primary) James Mancilla MD Semder, Christopher A, MD         Conclusion    CARDIAC CATHETERIZATION REPORT     Procedure:Left heart catheterization with selective injection of the LIMA and AJAY, RFR of AJAY and PCI to AJAY to RCA     DATE OF PROCEDURE: 08/24/22     PROCEDURE PERFORMED [facility]: [facility]     INDICATION FOR PROCEDURE: Unstable angina     DESCRIPTION OF PROCEDURE: After consent was obtained, access was gained in his right common femoral artery using a micropuncture technique and ultrasound guidance and a safe femoral approach. A 6-Mongolian short sheath was placed without difficulty.  Intraarterial cocktail was given.  Left ventriculography was performed using a 5 Mongolian pigtail catheter.  Selective injection of the left and right coronary arteries were performed using a JL-4 and JR-4 diagnostic catheter.  Selective injection of the right internal mammary and left internal mammary arteries were then performed.  Patient tolerated the procedure well without early complication and EBL was minimal.  At the conclusion, the sheath was removed and hemostasis was obtained. The patient went to the prep holding area in stable condition.     FINDINGS:     LEFT VENTRICULOGRAPHY: The LV pressure was 140/80.  There was inferoapical akinesis this is probably really an anterolateral inferoapical area I feel like it is old the overall EF is reduced at about 40 to 45% there is a little bit of anterior lateral hypokinesis also calcium noted in the coronaries.  There was no mitral insufficiency or gradient across the aortic valve on pullback.     CORONARY ANGIOGRAPHY:  Left main: 60% distal left main 60%  Left anterior descending: The percent occluded at the origin  Ramus intermedius: 70 to 80% origin and then 80% mid where it looks like a graft had tied into but now is occluded  Circumflex: Large vessel but it mainly is in the AV groove there are 2 small OM's that are free of disease  RCA: Is a  dominant vessel.  100% occluded in the midportion     LIMA to LAD: widely patent and normal.  Widely patent and looks good the distal LAD is also good     AJAY to RCA the graft is widely patent but at the insertion site is a 70 to 80% lesion the distal RCA looks good.        SUMMARY: Recent onset of unstable angina we will get a proceed on with interrogation of this are AJAY to the RCA.     Interventional note: 7000 units of heparin was given an additional 4000 units was given and the ACT was therapeutic.  A 6 Azeri CAMERON guiding catheter was passed up and intubated the right internal mammary artery.  We passed an RFR wire down in the usual fashion into the distal RCA and with RFR interrogation it was 0.71 which is hemodynamically significant.  At that point I brought a 3.0 x 15 mm Xience Lily drug-eluting stent down and deployed at 14 jovani at the insertion site.  Initially we felt it looked good however we remeasured with RFR it was 0.79 we took an additional shot with the wire pulled back in the AP view and he could see that there was a lesion just above the stent so we repositioned a BMW wire down the graft into the distal RCA brought a 3.0 x 8 mm Xience Lily drug-eluting stent down and deployed that at 14 jovani we had the same thing happen above the proximal stent there was a little kinking defect.  We then brought a 2.75 x 12 mm Xience Lily drug-eluting stent deployed that at 14 jovani and with this there was 0% residual SANTIAGO-3 flow and at that point balloons wires and guides were removed this case was halted sheath was removed and an Angio-Seal was deployed with hemodynamic success and this case was concluded.     Interventional data: 80% AJAY to RCA lesion; 0% residual: SANTIAGO flow is 3 pre and post, no dissection, no perforation no complication     RECOMMENDATIONS: Routine PCI care risk factor modification Home in the morning if he is okay        Jam Gavin MD  08/24/22  15:05 EDT      Time Under Physician  "Observation    Name Panel Role Time Period   Jam Gavin MD Panel 1 Primary 8/24/2022 1333 - 8/24/2022 1453      Total Sedation Time    Moderate sedation event time was not documented.       Vessel Access    A 6 fr sheath was successfully inserted with ultrasound guidance into the right femoral artery. Sheath insertion not delayed.       Sheath Disposition    Hemostasis started on the right femoral artery. Angio-Seal was used in achieving hemostasis. Closure device deployed in the vessel. Hemostasis achieved successfully.       Stent Inflated     Event Details User   2:30 PM Stent Inflated Stnt Cornry Rx Xience/Skypoint Rapdxng 3x15mm - First balloon inflation max pressure = 14 jovani. First balloon inflation duration = 8 seconds.  AR   2:44 PM Stent Inflated Stnt Cornry Rx Xience/Skypoint Rapdxng 3x8mm - First balloon inflation max pressure = 14 jovani. First balloon inflation duration = 10 seconds.  AR   2:48 PM Stent Inflated Stnt Cornry Rx Xience/Skypoint Rapdxng 2.58m27il - First balloon inflation max pressure = 14 jovani. First balloon inflation duration = 10 seconds.  AR      Radiation     Event Details User   3:01 PM Radiation Tracking Panel 1: Jam Gavin MD   Cumulative Air Kerma (mGy) = 411.000; Fluoro Time (min) = 20.1; DAP (mGy-cm2) = 93071.000 AR        Total Contrast    Medication Name Total Dose   iopamidol (ISOVUE-370) 76 % injection 173 mL       Patient Hx Of Height, Weight, and Vitals    Height Weight BSA (Calculated - sq m) BMI (Calculated) Retired BMI (kg/m2) Pulse BP   167.6 cm (66\") 72.6 kg (160 lb) 1.82 sq meters 25.8  67 125/63     Admission Information    Admission Date/Time Discharge Date/Time Room/Bed   08/23/22  07:42 PM  Pool/CATH     Medications  (Filter: Administrations occurring from 0000 to 1515 on 08/24/22)   important  Continuous medications are totaled by the amount administered until 08/24/22 1515.       fentaNYL citrate (PF) (SUBLIMAZE) injection (mcg)  Total dose:  25 " mcg    Date/Time Rate/Dose/Volume Action    08/24/22 1313 25 mcg Given      midazolam (VERSED) injection (mg)  Total dose:  0.5 mg    Date/Time Rate/Dose/Volume Action    08/24/22 1313 0.5 mg Given      lidocaine (XYLOCAINE) 2% injection (mL)  Total volume:  20 mL    Date/Time Rate/Dose/Volume Action    08/24/22 1339 10 mL Given    1340 10 mL Given      heparin (porcine) injection (Units)  Total dose:  11,000 Units    Date/Time Rate/Dose/Volume Action    08/24/22 1416 7,000 Units Given    1432 4,000 Units Given      iopamidol (ISOVUE-370) 76 % injection (mL)  Total volume:  173 mL    Date/Time Rate/Dose/Volume Action    08/24/22 1456 173 mL Given      clopidogrel (PLAVIX) tablet (mg)  Total dose:  600 mg    Date/Time Rate/Dose/Volume Action    08/24/22 1501 600 mg Given      aspirin tablet (mg)  Total dose:  325 mg    Date/Time Rate/Dose/Volume Action    08/24/22 1501 325 mg Given      dextrose (D50W) (25 g/50 mL) IV injection 25 g (g)  Total dose:  Cannot be calculated* Dosing weight:  72.6    *Administration dose not documented  Date/Time Rate/Dose/Volume Action    08/24/22 1309 *Not included in total MAR Hold      dextrose (GLUTOSE) oral gel 15 g (g)  Total dose:  Cannot be calculated* Dosing weight:  72.6    *Administration dose not documented  Date/Time Rate/Dose/Volume Action    08/24/22 1309 *Not included in total MAR Hold      glucagon (human recombinant) (GLUCAGEN DIAGNOSTIC) injection 1 mg (mg)  Total dose:  Cannot be calculated* Dosing weight:  72.6    *Administration dose not documented  Date/Time Rate/Dose/Volume Action    08/24/22 1309 *Not included in total MAR Hold      insulin lispro (ADMELOG) injection 0-9 Units (Units)  Total dose:  Cannot be calculated* Dosing weight:  72.6    *Administration dose not documented  Date/Time Rate/Dose/Volume Action    08/24/22 0742 *0 Units Not Given    1225 *0 Units Not Given    1309 *Not included in total MAR Hold      lisinopril (PRINIVIL,ZESTRIL) tablet 40 mg  (mg)  Total dose:  40 mg Dosing weight:  72.6    Date/Time Rate/Dose/Volume Action    08/24/22 1124 40 mg Given      nitroglycerin (NITROSTAT) SL tablet 0.4 mg (mg)  Total dose:  Cannot be calculated* Dosing weight:  72.6    *Administration dose not documented  Date/Time Rate/Dose/Volume Action    08/24/22 1309 *Not included in total MAR Hold      rosuvastatin (CRESTOR) tablet 20 mg (mg)  Total dose:  Cannot be calculated*    *Administration dose not documented  Date/Time Rate/Dose/Volume Action    08/24/22 1309 *Not included in total MAR Hold      sodium chloride 0.9 % flush 10 mL (mL)  Total dose:  Cannot be calculated*    *Administration dose not documented  Date/Time Rate/Dose/Volume Action    08/24/22 1309 *Not included in total MAR Hold      sodium chloride 0.9 % infusion (mL/hr)  Total volume:  381.25 mL Dosing weight:  72.6    Date/Time Rate/Dose/Volume Action    08/24/22 1128 75 mL/hr New Bag    1318 125 mL/hr Rate/Dose Change    1404 300  [vol]     1457 200  [vol]       tamsulosin (FLOMAX) 24 hr capsule 0.4 mg (mg)  Total dose:  Cannot be calculated* Dosing weight:  72.6    *Administration dose not documented  Date/Time Rate/Dose/Volume Action    08/24/22 1309 *Not included in total MAR Hold      Supplies    Name ID Temporary Type Charge Code Description Charge Code Quantity   KT MANIFLD CARDIAC 302 No Supply HC OR SUPPLY SHELL 272/NO CPT 163944 1   CATH VENT MIV RADL PIG ST TIP 5F 110CM 680 No Diagnostic Catheter HC OR SUPPLY SHELL 272/NO CPT 600260 1   GW PRESSUREWIRE AERIS W/ AGILE TP 175CM 72987 No Guidewire HC OR SUPPLY SHELL 272/ 919240 1   CATH DIAG IMPULSE FL4 5F 100CM 69007 No Diagnostic Catheter HC OR SUPPLY SHELL 272/NO CPT 815709 1   CATH DIAG IMPULSE FR4 5F 100CM 00057 No Diagnostic Catheter HC OR SUPPLY SHELL 272/NO CPT 898231 1   GW EMR FIX EXCHG J STD .035 3MM 260CM 83343 No Guidewire HC OR SUPPLY SHELL 272/ 033514 1   GW GLIDEWIRE STD ANGL .035IN [id number] 60544 No Guidewire HC OR  SUPPLY SHELL 272/ 554089 1   DEV CLS VASC ANGIOSEAL VIP PLTFRM 6FR 87767 No Hemostasis Device HC OR SUPPLY SHELL 278/ 760740 1   CATH GUIDE VISTABRITE IM 6F 90CM .07IN 75511 No Guide Catheter HC OR SUPPLY SHELL 278/ 697448 1   CATH DIAG IMPULSE IMT 5F 100CM 67003 No Diagnostic Catheter HC OR SUPPLY SHELL 272/NO CPT 566424 1   GW HITORQUE/BAL MID/WT J W/HCOAT .014 [id number] 24042 No Guidewire HC OR SUPPLY SHELL 272/ 455957 1   KT INTRO VERONICA MANDREL NITNL GW/SS 7 REG4F 31218 No Sheath HC OR SUPPLY SHELL 272/ 929481 1   INTRO SHEATH ART/FEM ENGAGE .035 6F12CM 60384 No Sheath HC OR SUPPLY SHELL 272/ 126438 1   DEV INDEFLATOR P/N 320014 89258 No Supply HC OR SUPPLY SHELL 272/NO CPT 432841 1   PK CATH CARD 40 06908 No Supply   1     Signed    Electronically signed by Jam Gavin MD on 8/24/22 at 1515 EDT      Link to Procedure Log    Procedure Log        Order-Level Documents:    Scan on 8/24/2022 1247 by [employer], [place]: CARDIAC CATH HEMO DATA - SCAN           Results Routing Tracking    View Results Routing Information     Order Report     Order Details

## 2022-08-24 NOTE — H&P
[facility]   HISTORY AND PHYSICAL    Patient Name: Kerrie Tyler  : 1945  MRN: [id number]  Primary Care Physician:  Mayito Sepulveda MD  Date of admission: 2022    Subjective   Subjective     Chief Complaint: Chest pain    HPI:    Kerrie Tyler is a 77 y.o. male with past medical history including but not limited to hypertension, hyperlipidemia, MI, CAD s/p CABG, BPH and diabetes presents [facility] with chest pain.  Patient report sudden onset of pain around 3:30 AM this morning that woke him from sleep.  Reports pain lasted for about 30 minutes and resolved after taking 1 sublingual nitroglycerin.  Patient describes her symptoms as stabbing and radiating to left neck and left shoulder.  Pain is nonexertional.  Patient reports having another episode at 10 AM and 3 PM and he had taken nitroglycerin that resolved his pain.  Report associated shortness of breath and nausea but denies diaphoresis, vomiting, or back pain.  Currently patient asymptomatic.  Patient reports that he followed with cardiologist in [place] but states that he has not seen one since he has moved here 2 years ago.  Denies abdominal pain, nausea, vomiting, or diarrhea.  Denies cough, chills, fever, or  lower extremities edema.  Denies dysuria, materia, increasing or frequency/urgency.    Review of Systems   All systems were reviewed and negative except for: All systems were reviewed and negative except for the mention of in HPI    Personal History     Past Medical History:   Diagnosis Date   • Kidney stone    • Myocardial infarction (HCC)        Past Surgical History:   Procedure Laterality Date   • CATARACT EXTRACTION, BILATERAL Bilateral    • CORONARY ARTERY BYPASS GRAFT      3 vessel   • EXTRACORPOREAL SHOCK WAVE LITHOTRIPSY (ESWL) Bilateral    • TONSILLECTOMY         Family History: family history includes Diabetes in his brother and father; Heart disease in his brother, daughter, and father;  Hyperlipidemia in his brother, daughter, and father; Hypertension in his brother, daughter, and father. Otherwise pertinent FHx was reviewed and not pertinent to current issue.    Social History:  reports that he quit smoking about 28 years ago. He has a 6.00 pack-year smoking history. He has never used smokeless tobacco. He reports that he does not drink alcohol and does not use drugs.    Home Medications:  Blood Gluc Meter Disp-Strips, Lancets Thin, Vitamin D, fish oil, glucose monitor, metFORMIN ER, metoprolol tartrate, nitroglycerin, rosuvastatin, tamsulosin, and trandolapril    Allergies:  Allergies   Allergen Reactions   • Sulfa Antibiotics Rash       Objective   Objective     Vitals:   Temp:  [98.2 °F (36.8 °C)] 98.2 °F (36.8 °C)  Heart Rate:  [68-80] 74  Resp:  [16] 16  BP: (138-167)/(69-70) 167/69  Physical Exam    Constitutional: Awake, alert, in no acute distress   Eyes: PERRLA, sclerae anicteric, no conjunctival injection   HENT: NCAT, mucous membranes moist   Neck: Supple, no thyromegaly, no lymphadenopathy, trachea midline   Respiratory: Clear to auscultation bilaterally, nonlabored respirations, no wheezing or stridor   Cardiovascular: RRR, no murmurs, rubs, or gallops, palpable pedal pulses bilaterally   Gastrointestinal: Positive bowel sounds, soft, nontender, nondistended   Musculoskeletal: No bilateral ankle edema, no clubbing or cyanosis to extremities   Psychiatric: Appropriate affect, cooperative   Neurologic: Oriented x 3, strength symmetric in all extremities, Cranial Nerves grossly intact to confrontation, speech clear   Skin: No rashes     Result Review    Result Review:  I have personally reviewed the results from the time of this admission to 8/23/2022 20:18 EDT and agree with these findings:  []  Laboratory list / accordion  []  Microbiology  []  Radiology  []  EKG/Telemetry   []  Cardiology/Vascular   []  Pathology  []  Old records  []  Other:  Most notable findings  include:  Troponin<0.010, , potassium 4.4, creatinine 1.81, WBC 8.3, hemoglobin 14.1, platelets 160      Assessment & Plan   Assessment / Plan     Brief Patient Summary:  Kerrie Tyler is a 77 y.o. male who was seen and evaluated the ED for chest pain.  Patient's been admitted to observation for further evaluation and cardiology consult.    Active Hospital Problems:  Active Hospital Problems    Diagnosis    • Chest pain      Plan:   Chest pain  CAD  -Initial troponin negative, trend  -EKG shows sinus rhythm with a rate of 72, T wave inversion in leads V1- V3  -Cardiology consult  -Telemetry monitoring  -Patient has received aspirin and nitroglycerin while in the ED  -Echo in a.m.    DM2  -Accu-Chek AC at bedtime  -Moderate insulin sliding scale    Hypertension hyperlipidemia  -Chronic conditions, continue medications  -Lipid panel pending  -Vital signs per nursing      DVT prophylaxis:  Mechanical DVT prophylaxis orders are present.    CODE STATUS:    Level Of Support Discussed With: Patient  Code Status (Patient has no pulse and is not breathing): CPR (Attempt to Resuscitate)  Medical Interventions (Patient has pulse or is breathing): Full Support    Admission Status:  I believe this patient meets observation status.    I wore an face mask, eye protection, and gloves during this patient encounter. Patient also wearing a surgical mask. Hand hygeine performed before and after seeing the patient.    Electronically signed by BEN Schuster, 08/23/22, 8:18 PM EDT.

## 2022-08-24 NOTE — CONSULTS
Patient Name: Kerrie Tyler  Age/Sex: 77 y.o. male  : 1945  MRN: [id number]    Date of Admission: 2022  Date of Encounter Visit: 22  Encounter Provider: Tony Littlejohn MD  Place of Service: [facility] CARDIOLOGY      Referring Provider: James Mancilla MD  Patient Care Team:  Mayito Sepulveda MD as PCP - General (Family Medicine)    Subjective:     Consulted for: Chest Pain      Chief Complaint: Chest Pain        History of Present Illness:  77 y.o. male patient with a history of coronary artery disease (s/p MI and CABG in ), hypertension, diabetes and kidney stones.  Of note the patient states that after his bypass he had chest pain that week and they took him back for catheterization showing that his vein graft to the OM was occluded.  He also reported that he had a AJAY and a LIMA.    He presented to the ED last night with intermittent chest pain that started Monday night.  He described the pain as lefts sided pressure that radiated in to his left arm and neck.  He did take NTG at home and improved his symptoms.     On arrival his EKG showed NSR and T wave inversion.  He received  mg.  Labs included troponin negative x 2.     We have been consulted for chest pain.  He moved from [place] a couple years ago and has not followed with cardiology since his move.  He has been chest pain free overnight.   ECHO has been performed and on my review shows an ejection fraction of approximately 40% along with distal anteroapical dyskinesis and septal dyskinesis.    He states that his chest pain is different than it has been in the past.  He has not had much of an issue with angina previously been states that he actually had to take multiple doses of sublingual nitroglycerin prior to this presentation.  He stated that this resolved the pain that he had recurrence.    Previous testing:   No Previous Cardiac Testing found     Past Medical  History:  Past Medical History:   Diagnosis Date   • Kidney stone    • Myocardial infarction (HCC)        Past Surgical History:   Procedure Laterality Date   • CATARACT EXTRACTION, BILATERAL Bilateral 1995   • CORONARY ARTERY BYPASS GRAFT  1994    3 vessel   • EXTRACORPOREAL SHOCK WAVE LITHOTRIPSY (ESWL) Bilateral 2004   • TONSILLECTOMY         Home Medications:   Medications Prior to Admission   Medication Sig Dispense Refill Last Dose   • Blood Gluc Meter Disp-Strips device 1 Units Daily for 360 days. 90 each 3 Past Week at Unknown time   • glucose monitor monitoring kit 1 each Daily. 1 each 1 Past Week at Unknown time   • Lancets Thin misc 1 Units Daily for 360 days. 90 each 3 Past Week at Unknown time   • metFORMIN ER (GLUCOPHAGE-XR) 500 MG 24 hr tablet TAKE 1 TABLET BY MOUTH TWICE DAILY WITH MEALS (Patient taking differently: Take 500 mg by mouth 2 (Two) Times a Day.) 180 tablet 1 Patient Taking Differently at Unknown time   • metoprolol tartrate (LOPRESSOR) 25 MG tablet TAKE 1 TABLET BY MOUTH TWICE DAILY (Patient taking differently: Take 25 mg by mouth Daily.) 180 tablet 1 Patient Taking Differently at Unknown time   • nitroglycerin (Nitrostat) 0.4 MG SL tablet Place 1 tablet under the tongue Every 5 (Five) Minutes As Needed for Chest Pain. Take no more than 3 doses in 15 minutes. 25 tablet 12 8/23/2022 at Unknown time   • Omega-3 Fatty Acids (fish oil) 1000 MG capsule capsule Take 1 capsule by mouth Daily With Breakfast. 90 capsule 3 8/23/2022 at Unknown time   • rosuvastatin (CRESTOR) 20 MG tablet TAKE 1 TABLET BY MOUTH DAILY 90 tablet 1 8/22/2022 at Unknown time   • tamsulosin (FLOMAX) 0.4 MG capsule 24 hr capsule Take 1 capsule by mouth Daily for 180 days. 90 capsule 3 8/22/2022 at Unknown time   • trandolapril (MAVIK) 4 MG tablet TAKE 1 TABLET BY MOUTH TWICE DAILY 180 tablet 1 8/23/2022 at Unknown time   • Cholecalciferol (Vitamin D) 50 MCG (2000 UT) capsule Take 1 capsule by mouth Daily. 90 capsule 3  "More than a month at Unknown time       Allergies:  Allergies   Allergen Reactions   • Sulfa Antibiotics Rash       Past Social History:  Social History     Socioeconomic History   • Marital status:    Tobacco Use   • Smoking status: Former Smoker     Packs/day: 0.50     Years: 12.00     Pack years: 6.00     Quit date:      Years since quittin.6   • Smokeless tobacco: Never Used   Substance and Sexual Activity   • Alcohol use: Never   • Drug use: Never   • Sexual activity: Yes     Partners: Female        Past Family History:  Family History   Problem Relation Age of Onset   • Diabetes Father    • Heart disease Father    • Hypertension Father    • Hyperlipidemia Father    • Diabetes Brother    • Heart disease Brother    • Hypertension Brother    • Hyperlipidemia Brother    • Heart disease Daughter    • Hyperlipidemia Daughter    • Hypertension Daughter    • Colon cancer Neg Hx    • Prostate cancer Neg Hx          Review of Systems:  All systems reviewed. Pertinent positives identified in HPI. All other systems are negative.       Objective:     Objective:  Temp:  [97.9 °F (36.6 °C)-98.3 °F (36.8 °C)] 98.3 °F (36.8 °C)  Heart Rate:  [53-80] 70  Resp:  [16-18] 16  BP: ()/(52-70) 108/58  No intake or output data in the 24 hours ending 22 0941  Body mass index is 25.82 kg/m².      2258   Weight: 72.6 kg (160 lb) 72.6 kg (160 lb)           Physical Exam:   Temp:  [97.9 °F (36.6 °C)-98.3 °F (36.8 °C)] 98.3 °F (36.8 °C)  Heart Rate:  [53-80] 70  Resp:  [16-18] 16  BP: ()/(52-70) 108/58  No intake or output data in the 24 hours ending 22 0941  Flowsheet Rows    Flowsheet Row First Filed Value   Admission Height 167.6 cm (66\") Documented at 2022   Admission Weight 72.6 kg (160 lb) Documented at 2022          General Appearance:    Alert, cooperative, in no acute distress   Head:    Normocephalic, without obvious abnormality, atraumatic     "   Neck/Lymph   No adenopathy, supple, no thyromegaly, no carotid bruit, no    JVD   Lungs:     Clear to auscultation bilaterally, no wheezes, rales, or     rhonchi    Cardiac:    Normal rate, regular rhythm, no murmur, no rub, no gallop   Chest Wall:    Sternotomy   GI:     Normal bowel sounds, soft, nontender, nondistended,            no rebound tenderness   Extremities:   No cyanosis, clubbing, or edema   Circulatory/Peripheral Vascular :   Pulses palpable and equal bilaterally   Integumentary:   No bleeding or rash. Normal temperature       Neurologic:   Cranial nerves 2 - 12 grossly intact, sensation intact             Lab Review:     Results from last 7 days   Lab Units 08/24/22  0520 08/23/22  1850   SODIUM mmol/L 138 138   POTASSIUM mmol/L 4.0 4.4   CHLORIDE mmol/L 104 101   CO2 mmol/L 24.9 25.3   BUN mg/dL 11 13   CREATININE mg/dL 0.76 1.18   GLUCOSE mg/dL 87 114*   CALCIUM mg/dL 9.2 9.6       Results from last 7 days   Lab Units 08/23/22 2050 08/23/22  1850   TROPONIN T ng/mL <0.010 <0.010     Results from last 7 days   Lab Units 08/24/22  0520   WBC 10*3/mm3 5.41   HEMOGLOBIN g/dL 12.8*   HEMATOCRIT % 39.2   PLATELETS 10*3/mm3 150     Results from last 7 days   Lab Units 08/23/22  1850   INR  1.12*   APTT seconds 32.1     Results from last 7 days   Lab Units 08/24/22  0520   CHOLESTEROL mg/dL 127     Results from last 7 days   Lab Units 08/23/22  1850   MAGNESIUM mg/dL 1.9     Results from last 7 days   Lab Units 08/24/22  0520   CHOLESTEROL mg/dL 127   TRIGLYCERIDES mg/dL 82   HDL CHOL mg/dL 42   LDL CHOL mg/dL 69     Results from last 7 days   Lab Units 08/23/22  1850   PROBNP pg/mL 207.0               Imaging:    Imaging Results (Most Recent)     Procedure Component Value Units Date/Time    XR Chest 2 View [[id number]] Collected: 08/23/22 1908     Updated: 08/23/22 1915    Narrative:      XR CHEST 2 VW-     HISTORY:  Chest pain, history of CABG x3.     COMPARISON:  None     FINDINGS:    Two views of the  chest were obtained.     Support Devices:  None.  Cardiac Silhouette/Mediastinum/Livia:  The cardiac silhouette is normal  in size. There is at least one coronary artery stent. There is calcific  aortic atherosclerosis. Mediastinal and hilar contours are within normal  limits.  Lungs/Pleural Spaces:  There is no focal consolidation or effusion.  There is a somewhat nodular opacity projecting over the lower right lung  base on the frontal view, which may reflect a pulmonary nodule and is  incompletely assessed. Recommend comparison with prior imaging, if  available, otherwise recommend follow-up CT chest.  Chest Wall/Diaphragm/Upper Abdomen:  Median sternotomy wires are intact.  There is calcific atherosclerosis in the upper abdomen.     This report was finalized on 8/23/2022 7:12 PM by Dr. Mansi Connolly M.D.                 EKG:         BASELINE:     No Prior EKG for comparison     I personally viewed and interpreted the patient's EKG/Telemetry data.    Assessment:   Assessment & Plan   1.  Coronary artery disease with prior CABG: Report of prior AJAY and LIMA  2.  Unstable angina  3.  Ischemic cardiomyopathy  4.  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  He has had recurrent episodes of chest discomfort requiring multiple doses of sublingual nitroglycerin.  - His electrocardiogram does not look any worse than it has prior.  His cardiac biomarkers are negative.  -I do not think stress test would be appropriate in the scenario with his multiple wall motion abnormalities, ischemic cardiomyopathy and now recurrent chest pain.  Catheterization has been recommended with a femoral artery access.      Thank you for allowing me to participate in the care of Kerrie Jayson. Feel free to contact me directly with any further questions or  concerns.    Tony Littlejohn MD  [facility]  08/24/22  09:41 EDT

## 2022-08-24 NOTE — PLAN OF CARE
Goal Outcome Evaluation:  Patient admitted to observation unit for evaluation of chest pain. Alert and oriented. Primary language is Yakut. Patient's son interpreted at patient's request. Patient offered no complaints of chest pain overnight. Troponin's negative. VSS per chart. Normal sinus on tele. NPO since midnight pending cardiology consult. No acute events to report.

## 2022-08-24 NOTE — DISCHARGE INSTRUCTIONS
[facility]  [address] [place] [address]    Coronary Angiogram with Stent (Femoral Approach) After Care    Refer to this sheet in the next few weeks. These instructions provide you with information on caring for yourself after your procedure. Your health care provider may also give you more specific instructions. Your treatment has been planned according to current medical practices, but problems sometimes occur. Call your health care provider if you have any problems or questions after your procedure.    WHAT TO EXPECT AFTER THE PROCEDURE    The insertion site may be tender for a few days after your procedure.  Minor discomfort or tenderness and a small bump at the catheter insertion site.  The bump should decrease in size and tenderness within 1 to 2 weeks.     HOME CARE INSTRUCTIONS      Take medicines only as directed by your health care provider. Blood thinners may be prescribed after your procedure to improve blood flow through the stent.  Metformin or any medications containing Metformin should not be taken for 48 hours after your procedure.    Cardiac Rehab may or may not be ordered.  Please consult with your physician.   Do not apply powder or lotion to the site.    Check your insertion site every day for redness, swelling, or fluid leaking from the insertion site.    Increase your fluid intake for the next 2 days.    Hold direct pressure over the site when you cough, sneeze, laugh or change positions.  Do this for the next 2 days.    Avoid strenuous activity as directed by your physician.  Follow instructions about when you can drive and return to work as directed by your physician.     Do not take baths, swim, or use a hot tub until your health care provider approves.   You may shower 24 hours after the procedure. Remove the bandage (dressing) and gently wash the site with plain soap and water.  Gently pat the site dry.  Do not rub the insertion area with a washcloth or towel.  You  may apply a band aid daily for 2 days if desired.   Limit your activity for the first 48 hours.  Do not bend, squat, or lift anything over 20lb. (9 kg) or as directed by your health care provider.  However, we recommend lifting nothing heavier than a gallon of milk.   Do not operate machinery or power tools for 24 hours.  A responsible adult should be with you for the first 24 hours after you arrive home. Do not make any important legal decisions or sign legal papers for 24 hours.  Do not drink alcohol for 24 hours.    Eat a heart-healthy diet. This should include plenty of fresh fruits and vegetables. Meat should be lean cuts. Avoid the following types of food:    Food that is high in salt.    Canned or highly processed food.    Food that is high in saturated fat or sugar.    Fried food.    Make any other lifestyle changes recommended by your health care provider. This may include:    Not using any tobacco products including cigarettes, chewing tobacco, or electronic cigarettes.   Managing your weight.    Getting regular exercise.    Managing your blood pressure.    Limiting your alcohol intake.    Managing other health problems, such as diabetes.    If you need an MRI after your heart stent was placed, be sure to tell the health care provider who orders the MRI that you have a heart stent.    Keep all follow-up visits as directed by your health care provider.      Call your doctor if:    You have heavy bleeding from the site.  Hold pressure on the site for 20 minutes.  If the bleeding stops, apply a fresh bandage and call your cardiologist.  However,  if you continue to have bleeding, call 911.      You develop chest pain, shortness of breath, feel faint, or pass out.  You have bleeding, swelling larger than a walnut, or drainage from the catheter insertion site.  You develop pain, discoloration, coldness, numbness, tingling, or severe bruising in the leg that held the catheter.  You develop bleeding from any  other place such as from the bowels.   You have a fever > 101 or chills.    Call Your Doctor If:     You have any symptoms of a stroke.  Remember BE FAST  B-balance. Sudden trouble walking or loss of balance.  E-eyes.  Sudden changes in how you see or a sudden onset of a very bad headache.   F-face. Sudden weakness or loss of feeling of the face or facial droop on one side.   A-arms Sudden weakness or numbness in one arm.  One arm drifts down if they are both held out in front of you. This happens suddenly and usually on one side of the body.  S-speech.  Sudden trouble speaking, slurred speech or trouble understanding what people are saying.   T-time  Time to call emergency services.  Write down the symptoms and the time they started.    MAKE SURE YOU:  Understand these instructions.  Will watch your condition.  Will get help right away if you are not doing well or get worse.

## 2022-08-24 NOTE — ED NOTES
".  Nursing report ED to floor  Kerrie Tyler  77 y.o.  male    HPI :   Chief Complaint   Patient presents with   • Chest Pain       Admitting doctor:   James Mancilla MD    Admitting diagnosis:   The primary encounter diagnosis was Acute chest pain. Diagnoses of Hypertension, unspecified type, Hyperglycemia, History of coronary artery disease, and Pulmonary nodule were also pertinent to this visit.    Code status:   Current Code Status     Date Active Code Status Order ID Comments User Context       8/23/2022 2005 CPR (Attempt to Resuscitate) [id number]  Micki Singh APRN ED     Advance Care Planning Activity      Questions for Current Code Status     Question Answer    Code Status (Patient has no pulse and is not breathing) CPR (Attempt to Resuscitate)    Medical Interventions (Patient has pulse or is breathing) Full Support    Level Of Support Discussed With Patient          Allergies:   Sulfa antibiotics    Intake and Output  No intake or output data in the 24 hours ending 08/23/22 2017    Weight:       08/23/22 1953   Weight: 72.6 kg (160 lb)       Most recent vitals:   Vitals:    08/23/22 1949 08/23/22 1951 08/23/22 1952 08/23/22 1953   BP: 138/70 167/69 167/69    Pulse: 68 74     Resp:       Temp:       TempSrc:       SpO2: 97% 98%     Weight:    72.6 kg (160 lb)   Height:    167.6 cm (66\")       Active LDAs/IV Access:   Lines, Drains & Airways     Active LDAs     Name Placement date Placement time Site Days    Peripheral IV 08/23/22 2014 Right Forearm 08/23/22 2014  Forearm  less than 1                Labs (abnormal labs have a star):   Labs Reviewed   COMPREHENSIVE METABOLIC PANEL - Abnormal; Notable for the following components:       Result Value    Glucose 114 (*)     All other components within normal limits    Narrative:     GFR Normal >60  Chronic Kidney Disease <60  Kidney Failure <15     CBC WITH AUTO DIFFERENTIAL - Abnormal; Notable for the following components:    Monocyte % 4.8 (*)     All " other components within normal limits   PROTIME-INR - Abnormal; Notable for the following components:    Protime 14.3 (*)     INR 1.12 (*)     All other components within normal limits   TROPONIN (IN-HOUSE) - Normal    Narrative:     Troponin T Reference Range:  <= 0.03 ng/mL-   Negative for AMI  >0.03 ng/mL-     Abnormal for myocardial necrosis.  Clinicians would have to utilize clinical acumen, EKG, Troponin and serial changes to determine if it is an Acute Myocardial Infarction or myocardial injury due to an underlying chronic condition.       Results may be falsely decreased if patient taking Biotin.     APTT - Normal   COVID PRE-OP / PRE-PROCEDURE SCREENING ORDER (NO ISOLATION)    Narrative:     The following orders were created for panel order COVID PRE-OP / PRE-PROCEDURE SCREENING ORDER (NO ISOLATION) - Swab, Nasopharynx.  Procedure                               Abnormality         Status                     ---------                               -----------         ------                     COVID-19, [facility] IN-HOUSE...[[id number]]                                                   Please view results for these tests on the individual orders.   COVID-19, [facility] IN-HOUSE CEPHEID/[facility], NP SWAB IN TRANSPORT MEDIA 8-12 HR TAT   RAINBOW DRAW    Narrative:     The following orders were created for panel order [place] Draw.  Procedure                               Abnormality         Status                     ---------                               -----------         ------                     Green Top (Gel)[[id number]]                                  Final result               Lavender Top[[id number]]                                     Final result               Gold Top - SST[[id number]]                                   Final result               Light Blue Top[[id number]]                                   Final result                 Please view results for these tests on the individual orders.   TROPONIN  (IN-HOUSE)   BNP (IN-HOUSE)   MAGNESIUM   CBC AND DIFFERENTIAL    Narrative:     The following orders were created for panel order CBC & Differential.  Procedure                               Abnormality         Status                     ---------                               -----------         ------                     CBC Auto Differential[[id number]]        Abnormal            Final result                 Please view results for these tests on the individual orders.   GREEN TOP   LAVENDER TOP   GOLD TOP - SST   LIGHT BLUE TOP       EKG:   ECG 12 Lead   Final Result   HEART RATE= 72  bpm   RR Interval= 832  ms   IA Interval= 174  ms   P Horizontal Axis= 41  deg   P Front Axis= 68  deg   QRSD Interval= 95  ms   QT Interval= 380  ms   QRS Axis= -31  deg   T Wave Axis= 94  deg   - ABNORMAL ECG -   Sinus rhythm   Left axis deviation   NO PRIOR TRACING AVAILABLE FOR COMPARISON   Electronically Signed By: Tony Littlejohn ([facility]) 23-Aug-2022 19:29:21   Date and Time of Study: 2022 18:30:26          Meds given in ED:   Medications   sodium chloride 0.9 % flush 10 mL (has no administration in time range)   nitroglycerin (NITROSTAT) SL tablet 0.4 mg (has no administration in time range)   aspirin chewable tablet 162 mg (162 mg Oral Given 22)       Imaging results:  No radiology results for the last day    Ambulatory status:   - stand by    Social issues:   Social History     Socioeconomic History   • Marital status:    Tobacco Use   • Smoking status: Former Smoker     Packs/day: 0.50     Years: 12.00     Pack years: 6.00     Quit date:      Years since quittin.6   • Smokeless tobacco: Never Used   Substance and Sexual Activity   • Alcohol use: Never   • Drug use: Never   • Sexual activity: Yes     Partners: Female

## 2022-08-24 NOTE — CASE MANAGEMENT/SOCIAL WORK
Discharge Planning Assessment  [facility]     Patient Name: Kerrie Tyler  MRN: [id number]  Today's Date: 8/23/2022    Admit Date: 8/23/2022     Discharge Needs Assessment     Row Name 08/23/22 2010       Living Environment    People in Home child(kojo), adult;spouse    Name(s) of People in Home Dayo granger and Maria D lee    Current Living Arrangements home    Primary Care Provided by self    Provides Primary Care For no one    Family Caregiver if Needed child(kojo), adult;spouse    Family Caregiver Names Dayo granger and Maria D lee    Quality of Family Relationships supportive    Able to Return to Prior Arrangements yes       Resource/Environmental Concerns    Resource/Environmental Concerns none    Transportation Concerns none       Transition Planning    Patient/Family Anticipates Transition to home with family    Patient/Family Anticipated Services at Transition none    Transportation Anticipated family or friend will provide       Discharge Needs Assessment    Readmission Within the Last 30 Days no previous admission in last 30 days    Equipment Currently Used at Home none    Concerns to be Addressed no discharge needs identified;denies needs/concerns at this time    Anticipated Changes Related to Illness none    Equipment Needed After Discharge none    Patient's Choice of Community Agency(s) Denies any needs at discharge               Discharge Plan     Row Name 08/23/22 2014       Plan    Plan Discharge home with family    Patient/Family in Agreement with Plan yes    Plan Comments I entered the patients room in proper PPE, introduced myself and my role.  The patient was accompanied by his son, Dayo Tyler, and permission was given to speak with him in the room.  He does speak some English but it is limited.  His son Dayo will also translate if needed.  He currently uses [facility] at [phone].  He denies ever having used Home Health or Rehab.  His son will  transport him home when he is discharged.  At this time he and his son deny any needs at discharge.  I did encourage them to request Case Managements should their needs change.  They verbalized understanding and had no further questions at this time.              Continued Care and Services - Admitted Since 8/23/2022    Coordination has not been started for this encounter.          Demographic Summary    No documentation.                Functional Status    No documentation.                Psychosocial    No documentation.                Abuse/Neglect    No documentation.                Legal    No documentation.                Substance Abuse    No documentation.                Patient Forms    No documentation.                   Natasha Paulson RN

## 2022-08-25 ENCOUNTER — READMISSION MANAGEMENT (OUTPATIENT)
Dept: CALL CENTER | Facility: HOSPITAL | Age: 77
End: 2022-08-25

## 2022-08-25 VITALS
DIASTOLIC BLOOD PRESSURE: 57 MMHG | TEMPERATURE: 98.1 F | WEIGHT: 160 LBS | SYSTOLIC BLOOD PRESSURE: 97 MMHG | BODY MASS INDEX: 25.71 KG/M2 | RESPIRATION RATE: 16 BRPM | OXYGEN SATURATION: 96 % | HEIGHT: 66 IN | HEART RATE: 72 BPM

## 2022-08-25 LAB
ANION GAP SERPL CALCULATED.3IONS-SCNC: 6.8 MMOL/L (ref 5–15)
BUN SERPL-MCNC: 15 MG/DL (ref 8–23)
BUN/CREAT SERPL: 16 (ref 7–25)
CALCIUM SPEC-SCNC: 8.8 MG/DL (ref 8.6–10.5)
CHLORIDE SERPL-SCNC: 108 MMOL/L (ref 98–107)
CHOLEST SERPL-MCNC: 115 MG/DL (ref 0–200)
CO2 SERPL-SCNC: 24.2 MMOL/L (ref 22–29)
CREAT SERPL-MCNC: 0.94 MG/DL (ref 0.76–1.27)
DEPRECATED RDW RBC AUTO: 42.2 FL (ref 37–54)
EGFRCR SERPLBLD CKD-EPI 2021: 83 ML/MIN/1.73
ERYTHROCYTE [DISTWIDTH] IN BLOOD BY AUTOMATED COUNT: 13.2 % (ref 12.3–15.4)
GLUCOSE BLDC GLUCOMTR-MCNC: 89 MG/DL (ref 70–130)
GLUCOSE SERPL-MCNC: 131 MG/DL (ref 65–99)
HBA1C MFR BLD: 6.1 % (ref 4.8–5.6)
HCT VFR BLD AUTO: 36.9 % (ref 37.5–51)
HDLC SERPL-MCNC: 40 MG/DL (ref 40–60)
HGB BLD-MCNC: 12.2 G/DL (ref 13–17.7)
LDLC SERPL CALC-MCNC: 63 MG/DL (ref 0–100)
LDLC/HDLC SERPL: 1.63 {RATIO}
MCH RBC QN AUTO: 29 PG (ref 26.6–33)
MCHC RBC AUTO-ENTMCNC: 33.1 G/DL (ref 31.5–35.7)
MCV RBC AUTO: 87.9 FL (ref 79–97)
PLATELET # BLD AUTO: 152 10*3/MM3 (ref 140–450)
PMV BLD AUTO: 10.3 FL (ref 6–12)
POTASSIUM SERPL-SCNC: 4.1 MMOL/L (ref 3.5–5.2)
QT INTERVAL: 432 MS
RBC # BLD AUTO: 4.2 10*6/MM3 (ref 4.14–5.8)
SODIUM SERPL-SCNC: 139 MMOL/L (ref 136–145)
TRIGL SERPL-MCNC: 50 MG/DL (ref 0–150)
VLDLC SERPL-MCNC: 12 MG/DL (ref 5–40)
WBC NRBC COR # BLD: 5.97 10*3/MM3 (ref 3.4–10.8)

## 2022-08-25 PROCEDURE — 99239 HOSP IP/OBS DSCHRG MGMT >30: CPT | Performed by: NURSE PRACTITIONER

## 2022-08-25 PROCEDURE — 93010 ELECTROCARDIOGRAM REPORT: CPT | Performed by: INTERNAL MEDICINE

## 2022-08-25 PROCEDURE — 80048 BASIC METABOLIC PNL TOTAL CA: CPT | Performed by: INTERNAL MEDICINE

## 2022-08-25 PROCEDURE — 80061 LIPID PANEL: CPT | Performed by: INTERNAL MEDICINE

## 2022-08-25 PROCEDURE — 83036 HEMOGLOBIN GLYCOSYLATED A1C: CPT | Performed by: INTERNAL MEDICINE

## 2022-08-25 PROCEDURE — 85027 COMPLETE CBC AUTOMATED: CPT | Performed by: INTERNAL MEDICINE

## 2022-08-25 PROCEDURE — 82962 GLUCOSE BLOOD TEST: CPT

## 2022-08-25 PROCEDURE — 93005 ELECTROCARDIOGRAM TRACING: CPT | Performed by: INTERNAL MEDICINE

## 2022-08-25 RX ORDER — ATORVASTATIN CALCIUM 40 MG/1
40 TABLET, FILM COATED ORAL NIGHTLY
Qty: 90 TABLET | Refills: 3 | Status: SHIPPED | OUTPATIENT
Start: 2022-08-25 | End: 2022-09-06

## 2022-08-25 RX ORDER — ASPIRIN 81 MG/1
81 TABLET ORAL DAILY
Qty: 30 TABLET | Refills: 11 | Status: SHIPPED | OUTPATIENT
Start: 2022-08-26

## 2022-08-25 RX ORDER — CLOPIDOGREL BISULFATE 75 MG/1
75 TABLET ORAL DAILY
Qty: 30 TABLET | Refills: 11 | Status: SHIPPED | OUTPATIENT
Start: 2022-08-26

## 2022-08-25 RX ADMIN — CLOPIDOGREL 75 MG: 75 TABLET, FILM COATED ORAL at 08:18

## 2022-08-25 RX ADMIN — TAMSULOSIN HYDROCHLORIDE 0.4 MG: 0.4 CAPSULE ORAL at 08:18

## 2022-08-25 RX ADMIN — LISINOPRIL 40 MG: 20 TABLET ORAL at 08:18

## 2022-08-25 RX ADMIN — ASPIRIN 81 MG: 81 TABLET, COATED ORAL at 08:18

## 2022-08-25 RX ADMIN — SODIUM CHLORIDE 125 ML/HR: 9 INJECTION, SOLUTION INTRAVENOUS at 03:25

## 2022-08-25 NOTE — DISCHARGE SUMMARY
Patient Name: Kerrie Tyler  :1945  77 y.o.    Date of Admit: 2022  Date of Discharge:  2022    Discharge Diagnosis:Unstable Angina  Problems Addressed this Visit        Cardiac and Vasculature    Essential hypertension    Relevant Medications    metoprolol tartrate (LOPRESSOR) 25 MG tablet    Coronary artery disease of bypass graft of native heart with stable angina pectoris (HCC)    Relevant Medications    clopidogrel (PLAVIX) 75 MG tablet (Start on 2022)    metoprolol tartrate (LOPRESSOR) 25 MG tablet    Other Relevant Orders    Ambulatory Referral to Cardiac Rehab    Acute chest pain - Primary      Other Visit Diagnoses     Hypertension, unspecified type        Relevant Medications    metoprolol tartrate (LOPRESSOR) 25 MG tablet    Hyperglycemia        History of coronary artery disease        Pulmonary nodule          Diagnoses       Codes Comments    Acute chest pain    -  Primary ICD-10-CM: R07.9  ICD-9-CM: 786.50     Hypertension, unspecified type     ICD-10-CM: I10  ICD-9-CM: 401.9     Hyperglycemia     ICD-10-CM: R73.9  ICD-9-CM: 790.29     History of coronary artery disease     ICD-10-CM: Z86.79  ICD-9-CM: V12.59     Pulmonary nodule     ICD-10-CM: R91.1  ICD-9-CM: 793.11     Coronary artery disease of bypass graft of native heart with stable angina pectoris (HCC)     ICD-10-CM: I25.708  ICD-9-CM: 414.05, 413.9     Essential hypertension     ICD-10-CM: I10  ICD-9-CM: 401.9           Hospital Course:   This is a 76 y/o man with a pmhx of coronary artery disease s/p MI and CABG in , hypertension, diabetes and kidney stones who presented to the ED with complaints of intermittent chest pain that started Monday evening. He took NTG at home and his symptoms improved. His EKG on arrival to the ED was abnormal with T wave inversion, troponin negative x 2. An echocardiogram was performed that showed an EF of approximately 40% with distal anteroapical dyskinesis and septal dyskinesis.  Given his multiple wall motion abnormalities, ischemic cardiomyopathy and recurrent chest pain, it was decided that it would be best to proceed with a cardiac catheterization.     On 8/24, he underwent a cardiac catheterization with Dr. Gavin with the results as follows:  Left main: 60% distal left main 60%  Left anterior descending: The percent occluded at the origin  Ramus intermedius: 70 to 80% origin and then 80% mid where it looks like a graft had tied into but now is occluded  Circumflex: Large vessel but it mainly is in the AV groove there are 2 small OM's that are free of disease  RCA: Is a dominant vessel.  100% occluded in the midportion  LIMA to LAD: widely patent and normal.  Widely patent and looks good the distal LAD is also good  AJAY to RCA the graft is widely patent but at the insertion site is a 70 to 80% lesion the distal RCA looks good.  Interventional data: 80% AJAY to RCA lesion; 0% residual: SANTIAGO flow is 3 pre and post, no dissection, no perforation no complication     He has recovered well overnight with no further complaints of chest pain. He has ambulated the unit without difficulty and his right groin is soft with no palpable hematoma. He is stable for discharge home today and will follow up with Dr Littlejohn in 1 month and his nurse practitioner in 1 week. Cardiac rehab has been ordered.          Procedures Performed  Procedure(s):  Left Heart Cath  FEMORAL ACCESS  Left ventriculography  Native mammary injection  RESTING FULL CYCLE RATIO  Stent LIZBET bypass graft  Coronary angiography  Percutaneous Coronary Intervention       Consults     Date and Time Order Name Status Description    8/24/2022  6:11 AM Inpatient Cardiology Consult Completed           Pertinent Test Results:   Results from last 7 days   Lab Units 08/25/22  0321 08/24/22  0520 08/23/22  1850   SODIUM mmol/L 139   < > 138   POTASSIUM mmol/L 4.1   < > 4.4   CHLORIDE mmol/L 108*   < > 101   CO2 mmol/L 24.2   < > 25.3   BUN mg/dL  15   < > 13   CREATININE mg/dL 0.94   < > 1.18   CALCIUM mg/dL 8.8   < > 9.6   BILIRUBIN mg/dL  --   --  0.5   ALK PHOS U/L  --   --  64   ALT (SGPT) U/L  --   --  17   AST (SGOT) U/L  --   --  21   GLUCOSE mg/dL 131*   < > 114*    < > = values in this interval not displayed.     Results from last 7 days   Lab Units 08/23/22 2050 08/23/22  1850   TROPONIN T ng/mL <0.010 <0.010     @LABRCNT(bnp)@  Results from last 7 days   Lab Units 08/25/22  0321   WBC 10*3/mm3 5.97   HEMOGLOBIN g/dL 12.2*   HEMATOCRIT % 36.9*   PLATELETS 10*3/mm3 152     Results from last 7 days   Lab Units 08/23/22  1850   INR  1.12*   APTT seconds 32.1     Results from last 7 days   Lab Units 08/23/22  1850   MAGNESIUM mg/dL 1.9     Results from last 7 days   Lab Units 08/25/22  0321   CHOLESTEROL mg/dL 115   TRIGLYCERIDES mg/dL 50   HDL CHOL mg/dL 40   LDL CHOL mg/dL 63       Condition on Discharge: stable    Discharge Medications     Discharge Medications      New Medications      Instructions Start Date   aspirin 81 MG EC tablet   81 mg, Oral, Daily   Start Date: August 26, 2022     atorvastatin 40 MG tablet  Commonly known as: LIPITOR   40 mg, Oral, Nightly      clopidogrel 75 MG tablet  Commonly known as: PLAVIX   75 mg, Oral, Daily   Start Date: August 26, 2022        Changes to Medications      Instructions Start Date   metFORMIN  MG 24 hr tablet  Commonly known as: GLUCOPHAGE-XR  What changed: when to take this   TAKE 1 TABLET BY MOUTH TWICE DAILY WITH MEALS      metoprolol tartrate 25 MG tablet  Commonly known as: LOPRESSOR  What changed: how much to take   12.5 mg, Oral, 2 Times Daily         Continue These Medications      Instructions Start Date   Blood Gluc Meter Disp-Strips device   1 Units, Does not apply, Daily      fish oil 1000 MG capsule capsule   1,000 mg, Oral, Daily With Breakfast      glucose monitor monitoring kit   1 each, Does not apply, Daily      Lancets Thin misc   1 Units, Does not apply, Daily       nitroglycerin 0.4 MG SL tablet  Commonly known as: Nitrostat   0.4 mg, Sublingual, Every 5 Minutes PRN, Take no more than 3 doses in 15 minutes.      tamsulosin 0.4 MG capsule 24 hr capsule  Commonly known as: FLOMAX   0.4 mg, Oral, Daily      trandolapril 4 MG tablet  Commonly known as: MAVIK   TAKE 1 TABLET BY MOUTH TWICE DAILY      Vitamin D 50 MCG (2000 UT) capsule   2,000 Units, Oral, Daily         Stop These Medications    rosuvastatin 20 MG tablet  Commonly known as: CRESTOR            Discharge Diet: Cardiac    Activity at Discharge: Post angioplasty restrictions    Discharge disposition: home    Follow-up Appointments  No future appointments.  Additional Instructions for the Follow-ups that You Need to Schedule     Ambulatory Referral to Cardiac Rehab   As directed            Test Results Pending at Discharge       BEN June, [facility]  08/25/22  10:23 EDT    Time: Discharge > 35 mins min  Electronically signed by BEN June, 08/25/22, 10:23 AM EDT.

## 2022-08-25 NOTE — PROGRESS NOTES
Discharge Planning Assessment  [facility]     Patient Name: Kerrie Tyler  MRN: [id number]  Today's Date: 8/25/2022    Admit Date: 8/23/2022     Discharge Needs Assessment    No documentation.                Discharge Plan     Row Name 08/25/22 1135       Plan    Plan Home    Final Discharge Disposition Code 01 - home or self-care    Final Note Home              Continued Care and Services - Admitted Since 8/23/2022    Coordination has not been started for this encounter.       Expected Discharge Date and Time     Expected Discharge Date Expected Discharge Time    Aug 25, 2022          Demographic Summary    No documentation.                Functional Status    No documentation.                Psychosocial    No documentation.                Abuse/Neglect    No documentation.                Legal    No documentation.                Substance Abuse    No documentation.                Patient Forms    No documentation.                   Kaila Mclean RN

## 2022-08-25 NOTE — PROGRESS NOTES
Enter Query Response Below      Query Response:       Patient was treated for CAD with unstable angina       If applicable, please update the problem list.        Patient: Kerrie Tyler        : 1945  Account: [id number]           Admit Date:         Options to Respond to Query:    1. Access the Encounter     a. From the To-Do Side bar, click Respond With Note.     b. Click New Note     c. Answer query within the yellow box.                d. Update the Problem List if applicable.     Dr. Littlejohn/Sonali CONTRERAS,     Patient admitted with chest pain radiating to left arm and neck, ischemic cardiomyopathy, s/p cath and stent placement.  Troponin: <0.010 x2, EKG on admission with T wave inversion.  Documentation of CAD with unstable angina in progress notes and documentation of NSTEMI in dc summary.     Please clarify if the patient is treated/monitored for the following:  -NSTEMI  -CAD with unstable angina  -other______  -unable to clinically determine    By submitting this query, we are merely seeking further clarification of documentation to accurately reflect all conditions that you are monitoring, evaluating, treating or that extend the hospitalization or utilize additional resources of care. Please utilize your independent clinical judgment when addressing the question(s) above.     This query and your response, once completed, will be entered into the legal medical record.    Sincerely,  Ashly Salas RN BSN  [email or web address]  Clinical Documentation Integrity Program

## 2022-08-25 NOTE — PROGRESS NOTES
[facility] Clinical Pharmacy Services: Patient Counseling Consult    Kerrie Tyler was counseled on the following medications: aspirin, clopidogrel, and atorvastatin. Counseling points included the following:    Explained indication of each medication, and patient's need for these medications.  Went over dosing and frequency of each medication.  Discussed any special administration, storage, or monitoring instructions with medications.  Discussed all important drug interactions, including over-the-counter medications and supplements.  Explained possible side effects for each medication.  Instructed the patient not to begin or discontinue any medications without informing his/her physician/pharmacist.  Addressed any specific questions the patient had in regards to their medication: where prescriptions were sent    Patient expressed understanding and had no further questions.      Kristan Guerra, PharmD  Clinical Pharmacist

## 2022-08-25 NOTE — PLAN OF CARE
Problem: Adult Inpatient Plan of Care  Goal: Plan of Care Review  Outcome: Ongoing, Progressing  Flowsheets (Taken 8/25/2022 0522)  Progress: improving  Plan of Care Reviewed With: patient  Outcome Evaluation:   VSS   pt had no complaints of pain during the night. Pt continues on fluids as ordered. Pt voiding during the night. Will continue to monitor and support.  Goal: Patient-Specific Goal (Individualized)  Outcome: Ongoing, Progressing  Goal: Absence of Hospital-Acquired Illness or Injury  Outcome: Ongoing, Progressing  Intervention: Identify and Manage Fall Risk  Recent Flowsheet Documentation  Taken 8/25/2022 0400 by Alcira Noe, RN  Safety Promotion/Fall Prevention:   safety round/check completed   room organization consistent   nonskid shoes/slippers when out of bed   clutter free environment maintained  Taken 8/25/2022 0200 by Alcira Noe RN  Safety Promotion/Fall Prevention:   safety round/check completed   room organization consistent   nonskid shoes/slippers when out of bed   clutter free environment maintained   activity supervised  Taken 8/25/2022 0000 by Alcira Noe RN  Safety Promotion/Fall Prevention:   safety round/check completed   room organization consistent   nonskid shoes/slippers when out of bed   clutter free environment maintained  Taken 8/24/2022 2200 by Alcira Noe RN  Safety Promotion/Fall Prevention:   activity supervised   clutter free environment maintained   gait belt   nonskid shoes/slippers when out of bed   room organization consistent   safety round/check completed  Taken 8/24/2022 2000 by Alcira Noe, RN  Safety Promotion/Fall Prevention:   safety round/check completed   room organization consistent   nonskid shoes/slippers when out of bed   gait belt   clutter free environment maintained   activity supervised  Intervention: Prevent Skin Injury  Recent Flowsheet Documentation  Taken 8/25/2022 0400 by Alcira Noe, RN  Body Position:   position  changed independently   supine  Taken 8/25/2022 0200 by Alcira Noe RN  Body Position:   position changed independently   supine  Taken 8/25/2022 0000 by Alcira Noe RN  Body Position:   position changed independently   supine  Taken 8/24/2022 2200 by Alcira Noe RN  Body Position:   supine   position changed independently  Taken 8/24/2022 2000 by Alcira Noe RN  Body Position:   supine   position changed independently  Intervention: Prevent and Manage VTE (Venous Thromboembolism) Risk  Recent Flowsheet Documentation  Taken 8/25/2022 0400 by Alcira Noe RN  Activity Management: activity adjusted per tolerance  Taken 8/25/2022 0200 by Alcira Noe RN  Activity Management: activity adjusted per tolerance  Taken 8/25/2022 0000 by Alcira Noe RN  Activity Management: activity adjusted per tolerance  Taken 8/24/2022 2200 by Alcira Noe RN  Activity Management: activity adjusted per tolerance  Taken 8/24/2022 2000 by Alcira Noe RN  Activity Management: activity adjusted per tolerance  Intervention: Prevent Infection  Recent Flowsheet Documentation  Taken 8/25/2022 0400 by Alcira Noe RN  Infection Prevention:   rest/sleep promoted   single patient room provided   hand hygiene promoted   environmental surveillance performed  Taken 8/25/2022 0200 by Alcira Noe RN  Infection Prevention:   single patient room provided   rest/sleep promoted   hand hygiene promoted   environmental surveillance performed  Taken 8/25/2022 0000 by Alcira Noe RN  Infection Prevention:   single patient room provided   rest/sleep promoted   hand hygiene promoted   environmental surveillance performed  Taken 8/24/2022 2200 by Alcira Noe RN  Infection Prevention:   hand hygiene promoted   rest/sleep promoted   single patient room provided   environmental surveillance performed  Taken 8/24/2022 2000 by Alcira Noe RN  Infection Prevention:   environmental surveillance  performed   hand hygiene promoted   rest/sleep promoted   single patient room provided  Goal: Optimal Comfort and Wellbeing  Outcome: Ongoing, Progressing  Intervention: Provide Person-Centered Care  Recent Flowsheet Documentation  Taken 8/25/2022 0400 by Alcira Noe RN  Trust Relationship/Rapport:   care explained   choices provided  Taken 8/25/2022 0000 by Alcira Noe RN  Trust Relationship/Rapport: care explained  Taken 8/24/2022 2000 by Alcira Noe RN  Trust Relationship/Rapport:   care explained   questions encouraged  Goal: Readiness for Transition of Care  Outcome: Ongoing, Progressing     Problem: Diabetes Comorbidity  Goal: Blood Glucose Level Within Targeted Range  Outcome: Ongoing, Progressing   Goal Outcome Evaluation:  Plan of Care Reviewed With: patient        Progress: improving  Outcome Evaluation: VSS; pt had no complaints of pain during the night. Pt continues on fluids as ordered. Pt voiding during the night. Will continue to monitor and support.

## 2022-08-26 ENCOUNTER — TRANSITIONAL CARE MANAGEMENT TELEPHONE ENCOUNTER (OUTPATIENT)
Dept: CALL CENTER | Facility: HOSPITAL | Age: 77
End: 2022-08-26

## 2022-08-26 NOTE — OUTREACH NOTE
Prep Survey    Flowsheet Row Responses   [facility] patient discharged from? [place]   Is LACE score < 7 ? No   Emergency Room discharge w/ pulse ox? No   Eligibility [facility]   Date of Admission 08/23/22   Date of Discharge 08/25/22   Discharge Disposition Home or Self Care   Discharge diagnosis chest pain, Hx CABG 1994,  heart cath & stent to bypass graft   Does the patient have one of the following disease processes/diagnoses(primary or secondary)? Other   Does the patient have Home health ordered? No   Is there a DME ordered? No   Prep survey completed? Yes          LAURA JACOME - Registered Nurse

## 2022-08-26 NOTE — OUTREACH NOTE
Call Center TCM Note    Flowsheet Row Responses   [facility] patient discharged from? [place]   Does the patient have one of the following disease processes/diagnoses(primary or secondary)? Other   TCM attempt successful? Yes   Call start time 1427   Call end time 1431   Is patient permission given to speak with other caregiver? Yes   List who call center can speak with EDDIECORNEL MICHELADELFO   Person spoke with today (if not patient) and relationship EDDIE MICHELADELFO- SON   Meds reviewed with patient/caregiver? Yes   Is the patient having any side effects they believe may be caused by any medication additions or changes? No   Does the patient have all medications ordered at discharge? Yes   Is the patient taking all medications as directed (includes completed medication regime)? Yes   Does the patient have a primary care provider?  Yes   Does the patient have an appointment with their PCP within 7 days of discharge? No   Comments regarding PCP PCP APPOINTMENT SCHEDULED FOR 9/6/22   What is preventing the patient from scheduling follow up appointments within 7 days of discharge? Haven't had time   Nursing Interventions Educated patient on importance of making appointment, Advised patient to make appointment   Urgent appointment interventions Facilitated patient appointment   Has the patient kept scheduled appointments due by today? N/A   Has home health visited the patient within 72 hours of discharge? N/A   Psychosocial issues? No   Did the patient receive a copy of their discharge instructions? Yes   Nursing interventions Reviewed instructions with patient   What is the patient's perception of their health status since discharge? Improving   Is the patient/caregiver able to teach back signs and symptoms related to disease process for when to call PCP? Yes   Is the patient/caregiver able to teach back signs and symptoms related to disease process for when to call 911? Yes   Is the patient/caregiver able to teach back  the hierarchy of who to call/visit for symptoms/problems? PCP, Specialist, Home health nurse, Urgent Care, ED, 911 Yes   If the patient is a current smoker, are they able to teach back resources for cessation? Not a smoker   TCM call completed? Yes          Shahrzad Torres LPN    8/26/2022, 14:33 EDT

## 2022-08-31 ENCOUNTER — TRANSCRIBE ORDERS (OUTPATIENT)
Dept: CARDIAC REHAB | Facility: HOSPITAL | Age: 77
End: 2022-08-31

## 2022-08-31 DIAGNOSIS — Z95.5 S/P DRUG ELUTING CORONARY STENT PLACEMENT: Primary | ICD-10-CM

## 2022-09-06 ENCOUNTER — OFFICE VISIT (OUTPATIENT)
Dept: FAMILY MEDICINE CLINIC | Facility: CLINIC | Age: 77
End: 2022-09-06

## 2022-09-06 VITALS
DIASTOLIC BLOOD PRESSURE: 68 MMHG | HEART RATE: 75 BPM | WEIGHT: 158 LBS | SYSTOLIC BLOOD PRESSURE: 122 MMHG | BODY MASS INDEX: 25.5 KG/M2 | RESPIRATION RATE: 19 BRPM | OXYGEN SATURATION: 98 %

## 2022-09-06 DIAGNOSIS — E78.2 MIXED HYPERLIPIDEMIA: ICD-10-CM

## 2022-09-06 DIAGNOSIS — I25.708 CORONARY ARTERY DISEASE OF BYPASS GRAFT OF NATIVE HEART WITH STABLE ANGINA PECTORIS: Primary | ICD-10-CM

## 2022-09-06 DIAGNOSIS — I10 ESSENTIAL HYPERTENSION: ICD-10-CM

## 2022-09-06 PROCEDURE — 1111F DSCHRG MED/CURRENT MED MERGE: CPT | Performed by: FAMILY MEDICINE

## 2022-09-06 PROCEDURE — 99495 TRANSJ CARE MGMT MOD F2F 14D: CPT | Performed by: FAMILY MEDICINE

## 2022-09-06 RX ORDER — ROSUVASTATIN CALCIUM 20 MG/1
20 TABLET, COATED ORAL DAILY
COMMUNITY
Start: 2022-08-27 | End: 2023-02-23 | Stop reason: SDUPTHER

## 2022-09-08 ENCOUNTER — OFFICE VISIT (OUTPATIENT)
Dept: CARDIOLOGY | Facility: CLINIC | Age: 77
End: 2022-09-08

## 2022-09-08 VITALS
HEART RATE: 67 BPM | HEIGHT: 66 IN | WEIGHT: 156 LBS | DIASTOLIC BLOOD PRESSURE: 78 MMHG | SYSTOLIC BLOOD PRESSURE: 130 MMHG | BODY MASS INDEX: 25.07 KG/M2

## 2022-09-08 DIAGNOSIS — I25.708 CORONARY ARTERY DISEASE OF BYPASS GRAFT OF NATIVE HEART WITH STABLE ANGINA PECTORIS: Primary | ICD-10-CM

## 2022-09-08 DIAGNOSIS — Z95.1 S/P CABG X 3: ICD-10-CM

## 2022-09-08 DIAGNOSIS — E78.2 MIXED HYPERLIPIDEMIA: ICD-10-CM

## 2022-09-08 DIAGNOSIS — I10 ESSENTIAL HYPERTENSION: ICD-10-CM

## 2022-09-08 PROCEDURE — 93000 ELECTROCARDIOGRAM COMPLETE: CPT | Performed by: NURSE PRACTITIONER

## 2022-09-08 PROCEDURE — 99214 OFFICE O/P EST MOD 30 MIN: CPT | Performed by: NURSE PRACTITIONER

## 2022-09-08 NOTE — PROGRESS NOTES
Date of Office Visit: 2022  Encounter Provider: BEN Packer  Place of Service: [facility] CARDIOLOGY  Patient Name: Kerrie Tyler  :1945    Chief Complaint   Patient presents with   • Coronary Artery Disease   :     HPI: Kerrie Tyler is a 77 y.o. male.  He is a patient with coronary artery disease (history of CABG in ) and hypertension.  On , he presented to the ED with chest pain.  Evidently he moved here from [place] a couple of years ago and has not followed with a cardiologist since.  Echocardiogram demonstrated normal LV function along with distal anteroapical dyskinesis and septal dyskinesis.  He was seen by Dr. Littlejohn in consultation and referred for a cardiac catheterization.  This demonstrated an 80% AJAY to RCA lesion for which he underwent drug-eluting stent placement.  He was started on aspirin and Plavix.  On , he was discharged.  He is here today for follow-up.   He is doing well.  He denies any chest pain, shortness of breath, palpitations, edema, dizziness, or syncope.  He denies any bleeding difficulties or melena.  His first appointment with cardiac rehab is scheduled for .    Past Medical History:   Diagnosis Date   • Kidney stone    • Myocardial infarction (HCC)        Past Surgical History:   Procedure Laterality Date   • CARDIAC CATHETERIZATION N/A 2022    Procedure: Left Heart Cath  FEMORAL ACCESS;  Surgeon: Jam Gavin MD;  Location: [facility] CATH INVASIVE LOCATION;  Service: Cardiovascular;  Laterality: N/A;  Use femoral access- patient has bilateral CAMERON's with previous CABG   • CARDIAC CATHETERIZATION N/A 2022    Procedure: Left ventriculography;  Surgeon: Jam Gavin MD;  [facility];  Service: Cardiovascular;  Laterality: N/A;   • CARDIAC CATHETERIZATION N/A 2022    Procedure: Native mammary injection;  Surgeon: Jam Gavin MD;  Location: [facility] CATH INVASIVE  LOCATION;  Service: Cardiovascular;  Laterality: N/A;   • CARDIAC CATHETERIZATION  2022    Procedure: RESTING FULL CYCLE RATIO;  Surgeon: Jam Gavin MD;  Location:  [facility] CATH INVASIVE LOCATION;  Service: Cardiovascular;;   • CARDIAC CATHETERIZATION N/A 2022    Procedure: Stent LIZBET bypass graft;  Surgeon: aJm Gavin MD;  Location:  [facility] CATH INVASIVE LOCATION;  Service: Cardiovascular;  Laterality: N/A;   • CARDIAC CATHETERIZATION N/A 2022    Procedure: Coronary angiography;  Surgeon: Jam Gavin MD;  Location:  [facility] CATH INVASIVE LOCATION;  Service: Cardiovascular;  Laterality: N/A;   • CARDIAC CATHETERIZATION N/A 2022    Procedure: Percutaneous Coronary Intervention;  Surgeon: Jam Gavin MD;  Location:  [facility] CATH INVASIVE LOCATION;  Service: Cardiovascular;  Laterality: N/A;   • CATARACT EXTRACTION, BILATERAL Bilateral    • CORONARY ARTERY BYPASS GRAFT      3 vessel   • EXTRACORPOREAL SHOCK WAVE LITHOTRIPSY (ESWL) Bilateral    • TONSILLECTOMY         Social History     Socioeconomic History   • Marital status:    Tobacco Use   • Smoking status: Former Smoker     Packs/day: 0.50     Years: 12.00     Pack years: 6.00     Quit date:      Years since quittin.7   • Smokeless tobacco: Never Used   Substance and Sexual Activity   • Alcohol use: Never   • Drug use: Never   • Sexual activity: Yes     Partners: Female       Family History   Problem Relation Age of Onset   • Diabetes Father    • Heart disease Father    • Hypertension Father    • Hyperlipidemia Father    • Diabetes Brother    • Heart disease Brother    • Hypertension Brother    • Hyperlipidemia Brother    • Heart disease Daughter    • Hyperlipidemia Daughter    • Hypertension Daughter    • Colon cancer Neg Hx    • Prostate cancer Neg Hx        Review of Systems   Constitutional: Negative.   Cardiovascular: Negative.  Negative for chest pain, dyspnea on exertion, leg swelling, orthopnea,  "paroxysmal nocturnal dyspnea and syncope.   Respiratory: Negative.    Hematologic/Lymphatic: Negative for bleeding problem.   Musculoskeletal: Negative for falls.   Gastrointestinal: Negative for melena.   Neurological: Negative for dizziness and light-headedness.       Allergies   Allergen Reactions   • Sulfa Antibiotics Rash         Current Outpatient Medications:   •  aspirin 81 MG EC tablet, Take 1 tablet by mouth Daily., Disp: 30 tablet, Rfl: 11  •  Blood Gluc Meter Disp-Strips device, 1 Units Daily for 360 days., Disp: 90 each, Rfl: 3  •  Cholecalciferol (Vitamin D) 50 MCG (2000 UT) capsule, Take 1 capsule by mouth Daily., Disp: 90 capsule, Rfl: 3  •  clopidogrel (PLAVIX) 75 MG tablet, Take 1 tablet by mouth Daily., Disp: 30 tablet, Rfl: 11  •  glucose monitor monitoring kit, 1 each Daily., Disp: 1 each, Rfl: 1  •  Lancets Thin misc, 1 Units Daily for 360 days., Disp: 90 each, Rfl: 3  •  metFORMIN ER (GLUCOPHAGE-XR) 500 MG 24 hr tablet, TAKE 1 TABLET BY MOUTH TWICE DAILY WITH MEALS (Patient taking differently: Take 500 mg by mouth 2 (Two) Times a Day.), Disp: 180 tablet, Rfl: 1  •  metoprolol tartrate (LOPRESSOR) 25 MG tablet, Take 0.5 tablets by mouth 2 (Two) Times a Day., Disp: 180 tablet, Rfl: 1  •  nitroglycerin (Nitrostat) 0.4 MG SL tablet, Place 1 tablet under the tongue Every 5 (Five) Minutes As Needed for Chest Pain. Take no more than 3 doses in 15 minutes., Disp: 25 tablet, Rfl: 12  •  Omega-3 Fatty Acids (fish oil) 1000 MG capsule capsule, Take 1 capsule by mouth Daily With Breakfast., Disp: 90 capsule, Rfl: 3  •  rosuvastatin (CRESTOR) 20 MG tablet, Take 20 mg by mouth Daily., Disp: , Rfl:   •  trandolapril (MAVIK) 4 MG tablet, TAKE 1 TABLET BY MOUTH TWICE DAILY, Disp: 180 tablet, Rfl: 1      Objective:     Vitals:    09/08/22 1236   BP: 130/78   Pulse: 67   Weight: 70.8 kg (156 lb)   Height: 167.6 cm (66\")     Body mass index is 25.18 kg/m².    PHYSICAL EXAM:    Neck:      Vascular: No JVD. "   Pulmonary:      Effort: Pulmonary effort is normal.      Breath sounds: Normal breath sounds.   Cardiovascular:      Normal rate. Regular rhythm.      Murmurs: There is no murmur.      No gallop. No click. No rub.   Pulses:     Intact distal pulses.   Skin:               ECG 12 Lead    Date/Time: 9/8/2022 12:42 PM  Performed by: Connie Quigley APRN  Authorized by: Connie Quigley APRN   Comparison: compared with previous ECG from 8/25/2022  Similar to previous ECG  Rhythm: sinus rhythm  Rate: normal  BPM: 67  Q waves: V1 and V2    T inversion: aVL, V1, V2, V3 and V4                Assessment:       Diagnosis Plan   1. 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 S/P CABG x 3     3. Essential hypertension     4. Mixed hyperlipidemia       Orders Placed This Encounter   Procedures   • ECG 12 Lead     This order was created via procedure documentation     Order Specific Question:   Release to patient     Answer:   Routine Release          Plan:       1.  Coronary artery disease.  History of CABG.  Now status post PCI of the AJAY to RCA.  He denies any symptoms of angina.  He starts rehab on the 16th.  Continue aspirin, Plavix, and rosuvastatin.      2.  Hypertension.  His blood pressure looks great.  Continue current regimen.      3.  Hyperlipidemia.  Lipid panel from hospitalization demonstrated an LDL of 63 and an HDL of 40.  He was started on atorvastatin 40 mg daily.      I think he is doing well.  I am not recommending any changes today, and he will follow-up with Dr. Littlejohn on 10/17.      As always, it has been a pleasure to participate in your patient's care.      Sincerely,         BEN Harper

## 2022-09-16 ENCOUNTER — OFFICE VISIT (OUTPATIENT)
Dept: CARDIAC REHAB | Facility: HOSPITAL | Age: 77
End: 2022-09-16

## 2022-09-16 DIAGNOSIS — Z95.5 S/P DRUG ELUTING CORONARY STENT PLACEMENT: Primary | ICD-10-CM

## 2022-09-16 LAB — GLUCOSE BLDC GLUCOMTR-MCNC: 143 MG/DL (ref 70–130)

## 2022-09-16 PROCEDURE — 82962 GLUCOSE BLOOD TEST: CPT

## 2022-09-16 PROCEDURE — 93798 PHYS/QHP OP CAR RHAB W/ECG: CPT

## 2022-09-19 ENCOUNTER — TREATMENT (OUTPATIENT)
Dept: CARDIAC REHAB | Facility: HOSPITAL | Age: 77
End: 2022-09-19

## 2022-09-19 DIAGNOSIS — Z95.5 S/P DRUG ELUTING CORONARY STENT PLACEMENT: Primary | ICD-10-CM

## 2022-09-19 PROCEDURE — 93798 PHYS/QHP OP CAR RHAB W/ECG: CPT

## 2022-09-21 ENCOUNTER — TREATMENT (OUTPATIENT)
Dept: CARDIAC REHAB | Facility: HOSPITAL | Age: 77
End: 2022-09-21

## 2022-09-21 DIAGNOSIS — Z95.5 S/P DRUG ELUTING CORONARY STENT PLACEMENT: Primary | ICD-10-CM

## 2022-09-21 DIAGNOSIS — E11.9 TYPE 2 DIABETES MELLITUS WITHOUT COMPLICATION, WITHOUT LONG-TERM CURRENT USE OF INSULIN: ICD-10-CM

## 2022-09-21 PROCEDURE — 93798 PHYS/QHP OP CAR RHAB W/ECG: CPT

## 2022-09-21 RX ORDER — BLOOD-GLUCOSE METER
1 KIT MISCELLANEOUS DAILY
Qty: 1 EACH | Refills: 1 | Status: SHIPPED | OUTPATIENT
Start: 2022-09-21

## 2022-09-21 NOTE — TELEPHONE ENCOUNTER
Caller: EDDIE HINTON    Relationship: Emergency Contact    Best call back number: [phone]    Requested Prescriptions:   Requested Prescriptions     Pending Prescriptions Disp Refills   • glucose monitor monitoring kit 1 each 1     Si each Daily.        Pharmacy where request should be sent: [id number] [address] AT [facility] [address] - [phone]  - [phone] FX     Additional details provided by patient: PATIENT IS OUT OF STRIPS    Does the patient have less than a 3 day supply:  [x] Yes  [] No    Ac Warner Rep   22 12:27 EDT

## 2022-09-23 ENCOUNTER — TREATMENT (OUTPATIENT)
Dept: CARDIAC REHAB | Facility: HOSPITAL | Age: 77
End: 2022-09-23

## 2022-09-23 ENCOUNTER — TELEPHONE (OUTPATIENT)
Dept: FAMILY MEDICINE CLINIC | Facility: CLINIC | Age: 77
End: 2022-09-23

## 2022-09-23 DIAGNOSIS — Z95.5 S/P DRUG ELUTING CORONARY STENT PLACEMENT: Primary | ICD-10-CM

## 2022-09-23 PROCEDURE — 93798 PHYS/QHP OP CAR RHAB W/ECG: CPT

## 2022-09-26 ENCOUNTER — TREATMENT (OUTPATIENT)
Dept: CARDIAC REHAB | Facility: HOSPITAL | Age: 77
End: 2022-09-26

## 2022-09-26 DIAGNOSIS — Z95.5 S/P DRUG ELUTING CORONARY STENT PLACEMENT: Primary | ICD-10-CM

## 2022-09-26 PROCEDURE — 93798 PHYS/QHP OP CAR RHAB W/ECG: CPT

## 2022-09-28 ENCOUNTER — TREATMENT (OUTPATIENT)
Dept: CARDIAC REHAB | Facility: HOSPITAL | Age: 77
End: 2022-09-28

## 2022-09-28 DIAGNOSIS — Z95.5 S/P DRUG ELUTING CORONARY STENT PLACEMENT: Primary | ICD-10-CM

## 2022-09-28 PROCEDURE — 93798 PHYS/QHP OP CAR RHAB W/ECG: CPT

## 2022-09-30 ENCOUNTER — TREATMENT (OUTPATIENT)
Dept: CARDIAC REHAB | Facility: HOSPITAL | Age: 77
End: 2022-09-30

## 2022-09-30 DIAGNOSIS — Z95.5 S/P DRUG ELUTING CORONARY STENT PLACEMENT: Primary | ICD-10-CM

## 2022-09-30 PROCEDURE — 93798 PHYS/QHP OP CAR RHAB W/ECG: CPT

## 2022-10-03 ENCOUNTER — TREATMENT (OUTPATIENT)
Dept: CARDIAC REHAB | Facility: HOSPITAL | Age: 77
End: 2022-10-03

## 2022-10-03 DIAGNOSIS — Z95.5 S/P DRUG ELUTING CORONARY STENT PLACEMENT: Primary | ICD-10-CM

## 2022-10-03 PROCEDURE — 93798 PHYS/QHP OP CAR RHAB W/ECG: CPT

## 2022-10-05 ENCOUNTER — TREATMENT (OUTPATIENT)
Dept: CARDIAC REHAB | Facility: HOSPITAL | Age: 77
End: 2022-10-05

## 2022-10-05 DIAGNOSIS — Z95.5 S/P DRUG ELUTING CORONARY STENT PLACEMENT: Primary | ICD-10-CM

## 2022-10-05 PROCEDURE — 93798 PHYS/QHP OP CAR RHAB W/ECG: CPT

## 2022-10-07 ENCOUNTER — TREATMENT (OUTPATIENT)
Dept: CARDIAC REHAB | Facility: HOSPITAL | Age: 77
End: 2022-10-07

## 2022-10-07 DIAGNOSIS — Z95.5 S/P DRUG ELUTING CORONARY STENT PLACEMENT: Primary | ICD-10-CM

## 2022-10-07 PROCEDURE — 93798 PHYS/QHP OP CAR RHAB W/ECG: CPT

## 2022-10-10 ENCOUNTER — TREATMENT (OUTPATIENT)
Dept: CARDIAC REHAB | Facility: HOSPITAL | Age: 77
End: 2022-10-10

## 2022-10-10 DIAGNOSIS — Z95.5 S/P DRUG ELUTING CORONARY STENT PLACEMENT: Primary | ICD-10-CM

## 2022-10-10 PROCEDURE — 93798 PHYS/QHP OP CAR RHAB W/ECG: CPT

## 2022-10-12 ENCOUNTER — TREATMENT (OUTPATIENT)
Dept: CARDIAC REHAB | Facility: HOSPITAL | Age: 77
End: 2022-10-12

## 2022-10-12 DIAGNOSIS — Z95.5 S/P DRUG ELUTING CORONARY STENT PLACEMENT: Primary | ICD-10-CM

## 2022-10-12 PROCEDURE — 93798 PHYS/QHP OP CAR RHAB W/ECG: CPT

## 2022-10-14 ENCOUNTER — TREATMENT (OUTPATIENT)
Dept: CARDIAC REHAB | Facility: HOSPITAL | Age: 77
End: 2022-10-14

## 2022-10-14 DIAGNOSIS — Z95.5 S/P DRUG ELUTING CORONARY STENT PLACEMENT: Primary | ICD-10-CM

## 2022-10-14 PROCEDURE — 93798 PHYS/QHP OP CAR RHAB W/ECG: CPT

## 2022-10-17 ENCOUNTER — TREATMENT (OUTPATIENT)
Dept: CARDIAC REHAB | Facility: HOSPITAL | Age: 77
End: 2022-10-17

## 2022-10-17 DIAGNOSIS — Z95.5 S/P DRUG ELUTING CORONARY STENT PLACEMENT: Primary | ICD-10-CM

## 2022-10-17 PROCEDURE — 93798 PHYS/QHP OP CAR RHAB W/ECG: CPT

## 2022-10-19 ENCOUNTER — TREATMENT (OUTPATIENT)
Dept: CARDIAC REHAB | Facility: HOSPITAL | Age: 77
End: 2022-10-19

## 2022-10-19 DIAGNOSIS — Z95.5 S/P DRUG ELUTING CORONARY STENT PLACEMENT: Primary | ICD-10-CM

## 2022-10-19 PROCEDURE — 93798 PHYS/QHP OP CAR RHAB W/ECG: CPT

## 2022-10-21 ENCOUNTER — TREATMENT (OUTPATIENT)
Dept: CARDIAC REHAB | Facility: HOSPITAL | Age: 77
End: 2022-10-21

## 2022-10-21 DIAGNOSIS — Z95.5 S/P DRUG ELUTING CORONARY STENT PLACEMENT: Primary | ICD-10-CM

## 2022-10-21 PROCEDURE — 93798 PHYS/QHP OP CAR RHAB W/ECG: CPT

## 2022-10-24 ENCOUNTER — TREATMENT (OUTPATIENT)
Dept: CARDIAC REHAB | Facility: HOSPITAL | Age: 77
End: 2022-10-24

## 2022-10-24 DIAGNOSIS — Z95.5 S/P DRUG ELUTING CORONARY STENT PLACEMENT: Primary | ICD-10-CM

## 2022-10-24 PROCEDURE — 93798 PHYS/QHP OP CAR RHAB W/ECG: CPT

## 2022-10-26 ENCOUNTER — TREATMENT (OUTPATIENT)
Dept: CARDIAC REHAB | Facility: HOSPITAL | Age: 77
End: 2022-10-26

## 2022-10-26 DIAGNOSIS — Z95.5 S/P DRUG ELUTING CORONARY STENT PLACEMENT: Primary | ICD-10-CM

## 2022-10-26 PROCEDURE — 93798 PHYS/QHP OP CAR RHAB W/ECG: CPT

## 2022-10-28 ENCOUNTER — TREATMENT (OUTPATIENT)
Dept: CARDIAC REHAB | Facility: HOSPITAL | Age: 77
End: 2022-10-28

## 2022-10-28 DIAGNOSIS — Z95.5 S/P DRUG ELUTING CORONARY STENT PLACEMENT: Primary | ICD-10-CM

## 2022-10-28 PROCEDURE — 93798 PHYS/QHP OP CAR RHAB W/ECG: CPT

## 2022-10-28 RX ORDER — BLOOD SUGAR DIAGNOSTIC
STRIP MISCELLANEOUS
Qty: 300 EACH | Refills: 10 | Status: SHIPPED | OUTPATIENT
Start: 2022-10-28 | End: 2022-10-31 | Stop reason: SDUPTHER

## 2022-10-31 ENCOUNTER — TREATMENT (OUTPATIENT)
Dept: CARDIAC REHAB | Facility: HOSPITAL | Age: 77
End: 2022-10-31

## 2022-10-31 DIAGNOSIS — Z95.5 S/P DRUG ELUTING CORONARY STENT PLACEMENT: Primary | ICD-10-CM

## 2022-10-31 PROCEDURE — 93798 PHYS/QHP OP CAR RHAB W/ECG: CPT

## 2022-11-02 ENCOUNTER — TREATMENT (OUTPATIENT)
Dept: CARDIAC REHAB | Facility: HOSPITAL | Age: 77
End: 2022-11-02

## 2022-11-02 DIAGNOSIS — Z95.5 S/P DRUG ELUTING CORONARY STENT PLACEMENT: Primary | ICD-10-CM

## 2022-11-02 PROCEDURE — 93798 PHYS/QHP OP CAR RHAB W/ECG: CPT

## 2022-11-03 ENCOUNTER — OFFICE VISIT (OUTPATIENT)
Dept: CARDIOLOGY | Facility: CLINIC | Age: 77
End: 2022-11-03

## 2022-11-03 VITALS
HEIGHT: 66 IN | HEART RATE: 72 BPM | DIASTOLIC BLOOD PRESSURE: 70 MMHG | BODY MASS INDEX: 24.75 KG/M2 | WEIGHT: 154 LBS | SYSTOLIC BLOOD PRESSURE: 148 MMHG

## 2022-11-03 DIAGNOSIS — Z95.1 S/P CABG X 3: ICD-10-CM

## 2022-11-03 DIAGNOSIS — I25.708 CORONARY ARTERY DISEASE OF BYPASS GRAFT OF NATIVE HEART WITH STABLE ANGINA PECTORIS: Primary | ICD-10-CM

## 2022-11-03 DIAGNOSIS — I10 ESSENTIAL HYPERTENSION: ICD-10-CM

## 2022-11-03 DIAGNOSIS — E78.2 MIXED HYPERLIPIDEMIA: ICD-10-CM

## 2022-11-03 PROCEDURE — 93000 ELECTROCARDIOGRAM COMPLETE: CPT | Performed by: INTERNAL MEDICINE

## 2022-11-03 PROCEDURE — 99214 OFFICE O/P EST MOD 30 MIN: CPT | Performed by: INTERNAL MEDICINE

## 2022-11-03 NOTE — PROGRESS NOTES
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   He moved here from [place] a couple of years prior and has not followed with a cardiologist.  Echocardiogram demonstrated normal LV function along with distal anteroapical dyskinesis and septal dyskinesis.    Patient was noted to have a discrete 80% stenosis in the AJAY to RCA anastomosis and underwent PCI to that vessel by Dr. Kenji Gavin.  The LAD fills via patent LIMA.  The ramus was occluded and the circumflex coronary artery had no flow-limiting disease.  He was subsequently was discharged home on aspirin and Plavix and has been doing well.  He denies any chest pain or dyspnea on exertion.  He has been completing his cardiac rehabilitation visits without any limitation.  His blood pressure is slightly elevated here today but has been well controlled previously.    Review of Systems   Constitutional: Negative for fever and malaise/fatigue.   HENT: Negative for nosebleeds and sore throat.    Eyes: Negative for blurred vision and double vision.   Cardiovascular: Negative for chest pain, claudication, palpitations and syncope.   Respiratory: Negative for cough, shortness of breath and snoring.    Endocrine: Negative for cold intolerance, heat intolerance and polydipsia.   Skin: Negative for itching, poor wound healing and rash.   Musculoskeletal: Negative for joint pain, joint swelling, muscle weakness and myalgias.   Gastrointestinal: Negative for abdominal pain, melena, nausea and vomiting.  "  Neurological: Negative for light-headedness, loss of balance, seizures, vertigo and weakness.   Psychiatric/Behavioral: Negative for altered mental status and depression.          Past Medical History:   Diagnosis Date   • Kidney stone    • Myocardial infarction (HCC)        The following portions of the patient's history were reviewed and updated as appropriate: Social history , Family history and Surgical history     Current Outpatient Medications on File Prior to Visit   Medication Sig Dispense Refill   • aspirin 81 MG EC tablet Take 1 tablet by mouth Daily. 30 tablet 11   • Cholecalciferol (Vitamin D) 50 MCG (2000 UT) capsule Take 1 capsule by mouth Daily. 90 capsule 3   • clopidogrel (PLAVIX) 75 MG tablet Take 1 tablet by mouth Daily. 30 tablet 11   • glucose blood (Accu-Chek Guide) test strip 1 each by Other route 3 (Three) Times a Day. Use as instructed 300 each 2   • glucose monitor monitoring kit 1 each Daily. 1 each 1   • metFORMIN ER (GLUCOPHAGE-XR) 500 MG 24 hr tablet TAKE 1 TABLET BY MOUTH TWICE DAILY WITH MEALS (Patient taking differently: Take 1 tablet by mouth 2 (Two) Times a Day.) 180 tablet 1   • metoprolol tartrate (LOPRESSOR) 25 MG tablet Take 0.5 tablets by mouth 2 (Two) Times a Day. 180 tablet 1   • nitroglycerin (Nitrostat) 0.4 MG SL tablet Place 1 tablet under the tongue Every 5 (Five) Minutes As Needed for Chest Pain. Take no more than 3 doses in 15 minutes. 25 tablet 12   • Omega-3 Fatty Acids (fish oil) 1000 MG capsule capsule Take 1 capsule by mouth Daily With Breakfast. 90 capsule 3   • rosuvastatin (CRESTOR) 20 MG tablet Take 20 mg by mouth Daily.     • trandolapril (MAVIK) 4 MG tablet TAKE 1 TABLET BY MOUTH TWICE DAILY 180 tablet 1     No current facility-administered medications on file prior to visit.       Allergies   Allergen Reactions   • Sulfa Antibiotics Rash       Vitals:    11/03/22 1535   BP: 148/70   Pulse: 72   Weight: 69.9 kg (154 lb)   Height: 167.6 cm (66\")     Body " mass index is 24.86 kg/m².   Constitutional:       Appearance: Well-developed.   Eyes:      General: No scleral icterus.     Conjunctiva/sclera: Conjunctivae normal.   HENT:      Head: Normocephalic and atraumatic.   Neck:      Thyroid: No thyromegaly.      Vascular: Normal carotid pulses. No carotid bruit, hepatojugular reflux or JVD.      Trachea: No tracheal deviation.   Pulmonary:      Effort: No respiratory distress.      Breath sounds: Normal breath sounds. No decreased breath sounds. No wheezing. No rhonchi. No rales.   Chest:      Chest wall: Not tender to palpatation.   Cardiovascular:      Normal rate. Regular rhythm.      No gallop.   Pulses:     Carotid: 2+ bilaterally.     Radial: 2+ bilaterally.     Femoral: 2+ bilaterally.     Dorsalis pedis: 2+ bilaterally.     Posterior tibial: 2+ bilaterally.  Edema:     Peripheral edema absent.   Abdominal:      General: Bowel sounds are normal. There is no distension.      Palpations: Abdomen is soft.      Tenderness: There is no abdominal tenderness.   Musculoskeletal:         General: No deformity.      Cervical back: Normal range of motion and neck supple. Skin:     Findings: No erythema or rash.      Comments: Sternotomy   Neurological:      Mental Status: Alert and oriented to person, place, and time.      Sensory: No sensory deficit.   Psychiatric:         Behavior: Behavior normal.            Lab Results   Component Value Date    WBC 5.97 08/25/2022    HGB 12.2 (L) 08/25/2022    HCT 36.9 (L) 08/25/2022    MCV 87.9 08/25/2022     08/25/2022       Lab Results   Component Value Date    GLUCOSE 131 (H) 08/25/2022    BUN 15 08/25/2022    CREATININE 0.94 08/25/2022    EGFRIFNONA 83 09/14/2021    EGFRIFAFRI 96 09/14/2021    BCR 16.0 08/25/2022    K 4.1 08/25/2022    CO2 24.2 08/25/2022    CALCIUM 8.8 08/25/2022    PROTENTOTREF 7.5 09/14/2021    ALBUMIN 4.50 08/23/2022    LABIL2 1.7 09/14/2021    AST 21 08/23/2022    ALT 17 08/23/2022       Lab Results    Component Value Date    GLUCOSE 131 (H) 08/25/2022    CALCIUM 8.8 08/25/2022     08/25/2022    K 4.1 08/25/2022    CO2 24.2 08/25/2022     (H) 08/25/2022    BUN 15 08/25/2022    CREATININE 0.94 08/25/2022    EGFRIFAFRI 96 09/14/2021    EGFRIFNONA 83 09/14/2021    BCR 16.0 08/25/2022    ANIONGAP 6.8 08/25/2022       Lab Results   Component Value Date    CHOL 115 08/25/2022    CHLPL 161 09/14/2021    TRIG 50 08/25/2022    HDL 40 08/25/2022    LDL 63 08/25/2022         ECG 12 Lead    Date/Time: 11/3/2022 3:46 PM  Performed by: Tony Littlejohn MD  Authorized by: Tony Littlejohn MD   Comparison: compared with previous ECG from 9/8/2022  Similar to previous ECG  Rhythm: sinus rhythm  Rate: normal  QRS axis: left                 8/24/22  CORONARY ANGIOGRAPHY:  Left main: 60% distal left main 60%  Left anterior descending: The percent occluded at the origin  Ramus intermedius: 70 to 80% origin and then 80% mid where it looks like a graft had tied into but now is occluded  Circumflex: Large vessel but it mainly is in the AV groove there are 2 small OM's that are free of disease  RCA: Is a dominant vessel.  100% occluded in the midportion     LIMA to LAD: widely patent and normal.  Widely patent and looks good the distal LAD is also good     AJAY to RCA the graft is widely patent but at the insertion site is a 70 to 80% lesion the distal RCA looks good.    Patient underwent 3 drug-eluting stents placed to Dr. Kenji Gavin      Results for orders placed during the hospital encounter of 08/23/22    Adult Transthoracic Echo Complete W/ Cont if Necessary Per Protocol    Interpretation Summary  · The study is technically difficult for diagnosis.  · Estimated right ventricular systolic pressure from tricuspid regurgitation is normal (<35 mmHg). Calculated right ventricular systolic pressure from tricuspid regurgitation is 21.9 mmHg.  · Estimated left ventricular EF = 56% Left ventricular systolic function  is normal.  · Left ventricular diastolic function was normal.  · The following left ventricular wall segments are hypokinetic: apical septal, basal inferoseptal, mid inferoseptal, mid anteroseptal and basal inferoseptal.      DISCUSSION/SUMMARY  Very pleasant 77-year-old male with a medical history of coronary artery disease with prior CABG including a LIMA to LAD and AJAY to RCA, preserved ejection fraction, hyperlipidemia, hypertension and diabetes mellitus who presented with chest discomfort consistent with unstable angina in August 2022.  He underwent PCI to the mid RCA anastomosis distally with 3 overlapping drug-eluting stents.  He tolerated this well and was subsequently discharged on dual antiplatelet therapy.  He has no new complaints.    1.  Coronary artery disease with prior CABG and PCI of the anastomosis of the LIMA to LAD in August 2022  -Continue aspirin lifelong and Plavix for 1 year.  -Plan on continuing Crestor therapy.  20 mg p.o. nightly.  Last LDL was 63.  -Continue metoprolol tartrate 12.5 mg p.o. every 12 hours.     2.  Hyperlipidemia: Continue Crestor 20 mg p.o. nightly.  Tolerating well.  Patient has mild myalgias that are tolerable.  No elevation in transaminases

## 2022-11-04 ENCOUNTER — TREATMENT (OUTPATIENT)
Dept: CARDIAC REHAB | Facility: HOSPITAL | Age: 77
End: 2022-11-04

## 2022-11-04 DIAGNOSIS — Z95.5 S/P DRUG ELUTING CORONARY STENT PLACEMENT: Primary | ICD-10-CM

## 2022-11-04 PROCEDURE — 93798 PHYS/QHP OP CAR RHAB W/ECG: CPT

## 2022-11-07 ENCOUNTER — TREATMENT (OUTPATIENT)
Dept: CARDIAC REHAB | Facility: HOSPITAL | Age: 77
End: 2022-11-07

## 2022-11-07 DIAGNOSIS — Z95.5 S/P DRUG ELUTING CORONARY STENT PLACEMENT: Primary | ICD-10-CM

## 2022-11-07 PROCEDURE — 93798 PHYS/QHP OP CAR RHAB W/ECG: CPT

## 2022-11-09 ENCOUNTER — TREATMENT (OUTPATIENT)
Dept: CARDIAC REHAB | Facility: HOSPITAL | Age: 77
End: 2022-11-09

## 2022-11-09 DIAGNOSIS — Z95.5 S/P DRUG ELUTING CORONARY STENT PLACEMENT: Primary | ICD-10-CM

## 2022-11-09 PROCEDURE — 93798 PHYS/QHP OP CAR RHAB W/ECG: CPT

## 2022-11-11 ENCOUNTER — TREATMENT (OUTPATIENT)
Dept: CARDIAC REHAB | Facility: HOSPITAL | Age: 77
End: 2022-11-11

## 2022-11-11 DIAGNOSIS — Z95.5 S/P DRUG ELUTING CORONARY STENT PLACEMENT: Primary | ICD-10-CM

## 2022-11-11 PROCEDURE — 93798 PHYS/QHP OP CAR RHAB W/ECG: CPT

## 2023-02-16 DIAGNOSIS — R39.11 BENIGN PROSTATIC HYPERPLASIA WITH URINARY HESITANCY: ICD-10-CM

## 2023-02-16 DIAGNOSIS — N40.1 BENIGN PROSTATIC HYPERPLASIA WITH URINARY HESITANCY: ICD-10-CM

## 2023-02-16 RX ORDER — TAMSULOSIN HYDROCHLORIDE 0.4 MG/1
CAPSULE ORAL
Qty: 90 CAPSULE | Refills: 0 | Status: SHIPPED | OUTPATIENT
Start: 2023-02-16

## 2023-02-16 NOTE — TELEPHONE ENCOUNTER
This is a Dr. Sepulveda patient    Last OV: 9/6/22  Next OV: none scheduled   Last refill: 8/24/22

## 2023-02-23 ENCOUNTER — TELEPHONE (OUTPATIENT)
Dept: FAMILY MEDICINE CLINIC | Facility: CLINIC | Age: 78
End: 2023-02-23

## 2023-02-23 DIAGNOSIS — N40.1 BENIGN PROSTATIC HYPERPLASIA WITH URINARY HESITANCY: ICD-10-CM

## 2023-02-23 DIAGNOSIS — R39.11 BENIGN PROSTATIC HYPERPLASIA WITH URINARY HESITANCY: ICD-10-CM

## 2023-02-23 RX ORDER — ROSUVASTATIN CALCIUM 20 MG/1
20 TABLET, COATED ORAL DAILY
Qty: 90 TABLET | Refills: 1 | Status: SHIPPED | OUTPATIENT
Start: 2023-02-23

## 2023-02-23 RX ORDER — TAMSULOSIN HYDROCHLORIDE 0.4 MG/1
CAPSULE ORAL
Qty: 90 CAPSULE | Refills: 0 | OUTPATIENT
Start: 2023-02-23

## 2023-02-23 NOTE — TELEPHONE ENCOUNTER
Caller: EDDIE HINTON    Relationship to patient: Emergency Contact    Best call back number:  - THIS IS THE SON'S NUMBER - DUE TO LANGUAGE, IT IS EASIER TO SPEAK WITH EDDIE.    Patient is needing: PATIENT'S SON STATES THAT THE PATIENT WOULD LIKE TO DISCONTINUE THE ATORVASTATIN AND HAVE DR. ARMENDARIZ PRESCRIBE HIM ROSUVASTATIN 20 MG.   SON STATES THAT PATIENT DOES NOT LIKE TAKING THE ATORVASTATIN.  PLEASE ADVISE PATIENT IF IT IS OKAY TO CHANGE AND WHEN IT WILL BE CALLED INTO HIS PHARMACY WHICH IS [facility].

## 2023-03-06 ENCOUNTER — HOSPITAL ENCOUNTER (EMERGENCY)
Facility: HOSPITAL | Age: 78
Discharge: HOME | End: 2023-03-06
Attending: EMERGENCY MEDICINE | Admitting: EMERGENCY MEDICINE
Payer: MEDICARE

## 2023-03-06 ENCOUNTER — APPOINTMENT (OUTPATIENT)
Dept: CT IMAGING | Facility: HOSPITAL | Age: 78
End: 2023-03-06
Payer: MEDICARE

## 2023-03-06 VITALS
BODY MASS INDEX: 24.75 KG/M2 | HEIGHT: 66 IN | WEIGHT: 154 LBS | HEART RATE: 79 BPM | OXYGEN SATURATION: 99 % | TEMPERATURE: 98.5 F | RESPIRATION RATE: 18 BRPM | SYSTOLIC BLOOD PRESSURE: 141 MMHG | DIASTOLIC BLOOD PRESSURE: 69 MMHG

## 2023-03-06 DIAGNOSIS — S09.90XA CHI (CLOSED HEAD INJURY), INITIAL ENCOUNTER: Primary | ICD-10-CM

## 2023-03-06 DIAGNOSIS — S16.1XXA CERVICAL STRAIN, ACUTE, INITIAL ENCOUNTER: ICD-10-CM

## 2023-03-06 DIAGNOSIS — S09.93XA FACIAL INJURY, INITIAL ENCOUNTER: ICD-10-CM

## 2023-03-06 PROCEDURE — 90715 TDAP VACCINE 7 YRS/> IM: CPT | Performed by: PHYSICIAN ASSISTANT

## 2023-03-06 PROCEDURE — 72125 CT NECK SPINE W/O DYE: CPT

## 2023-03-06 PROCEDURE — 90471 IMMUNIZATION ADMIN: CPT | Performed by: PHYSICIAN ASSISTANT

## 2023-03-06 PROCEDURE — 25010000002 TETANUS-DIPHTH-ACELL PERTUSSIS 5-2.5-18.5 LF-MCG/0.5 SUSPENSION PREFILLED SYRINGE: Performed by: PHYSICIAN ASSISTANT

## 2023-03-06 PROCEDURE — 99283 EMERGENCY DEPT VISIT LOW MDM: CPT

## 2023-03-06 PROCEDURE — 70450 CT HEAD/BRAIN W/O DYE: CPT

## 2023-03-06 PROCEDURE — 70486 CT MAXILLOFACIAL W/O DYE: CPT

## 2023-03-06 RX ADMIN — TETANUS TOXOID, REDUCED DIPHTHERIA TOXOID AND ACELLULAR PERTUSSIS VACCINE, ADSORBED 0.5 ML: 5; 2.5; 8; 8; 2.5 SUSPENSION INTRAMUSCULAR at 15:52

## 2023-03-06 NOTE — ED TRIAGE NOTES
Pt caught foot on a curb.  He fell onto his face.  C/o nosebleed and facial pain.  No loc.  Is on plavix    Patient was placed in face mask during first look triage.  Patient was wearing a face mask throughout encounter.  I wore personal protective equipment throughout the encounter.  Hand hygiene was performed before and after patient encounter.

## 2023-03-06 NOTE — ED PROVIDER NOTES
EMERGENCY DEPARTMENT ENCOUNTER    Room Number:  S04/04  Date of encounter:  3/6/2023  PCP: Mayito Sepulveda MD  Historian: Patient  Full history not obtainable due to: None    HPI:  Chief Complaint: Facial injury    Context: Kerrie Tyler is a 77 y.o. male with a PMH significant for type II but diabetes, hyperlipidemia, hypertension, CAD, on Plavix who presents to the ED c/o facial injury.  The patient sustained a mechanical fall walking into the doctor's office today.  States that he struck his face on the pavement on a curb.  Denies any loss of consciousness.  Denies numbness or weakness of the face or extremities, slurred speech, visual disturbance, photophobia, headache.  He did sustain an abrasion just below the nose on the upper lip without any obvious signs of laceration.  No injury to the jaw or teeth.  No malocclusion.  He denies nausea, vomiting.  No other injuries sustained at the time of the fall.      MEDICAL RECORD REVIEW:    Came to the ER expecting to get out less than 20 minutes upon review of the medical record it appears the patient was evaluated in the office with cardiology on 11/3/2022 For routine medical management related to his CAD.  No changes to medications or interventions at that time.  The patient had a normal BMP on 8/24/2022 and a normal troponin on 8/23/2022.    PAST MEDICAL HISTORY    Active Ambulatory Problems     Diagnosis Date Noted   • Type 2 diabetes mellitus without complication, without long-term current use of insulin ([facility]) 12/16/2020   • Mixed hyperlipidemia 12/16/2020   • Essential hypertension 12/16/2020   • Coronary artery disease of bypass graft of native heart with stable angina pectoris ([facility]) 12/16/2020   • S/P CABG x 3 12/16/2020   • Benign prostatic hyperplasia with urinary hesitancy 09/14/2021   • Vitamin D deficiency 09/14/2021   • Presbycusis of both ears 09/14/2021   • Diabetic retinopathy associated with type 2 diabetes mellitus ([facility]) 09/14/2021   •  Acute chest pain 08/23/2022     Resolved Ambulatory Problems     Diagnosis Date Noted   • No Resolved Ambulatory Problems     Past Medical History:   Diagnosis Date   • Kidney stone    • Myocardial infarction (HCC)          PAST SURGICAL HISTORY  Past Surgical History:   Procedure Laterality Date   • CARDIAC CATHETERIZATION N/A 8/24/2022    Procedure: Left Heart Cath  FEMORAL ACCESS;  Surgeon: Jam Gavin MD;  Location: [facility] CATH INVASIVE LOCATION;  Service: Cardiovascular;  Laterality: N/A;  Use femoral access- patient has bilateral CAMERON's with previous CABG   • CARDIAC CATHETERIZATION N/A 8/24/2022    Procedure: Left ventriculography;  Surgeon: Jam Gavin MD;  [facility];  Service: Cardiovascular;  Laterality: N/A;   • CARDIAC CATHETERIZATION N/A 8/24/2022    Procedure: Native mammary injection;  Surgeon: Jam Gavin MD;  [facility];  Service: Cardiovascular;  Laterality: N/A;   • CARDIAC CATHETERIZATION  8/24/2022    Procedure: RESTING FULL CYCLE RATIO;  Surgeon: Jam Gavin MD;  [facility];  Service: Cardiovascular;;   • CARDIAC CATHETERIZATION N/A 8/24/2022    Procedure: Stent LIZBET bypass graft;  Surgeon: Jam Gavin MD;  [facility];  Service: Cardiovascular;  Laterality: N/A;   • CARDIAC CATHETERIZATION N/A 8/24/2022    Procedure: Coronary angiography;  Surgeon: Jam Gavin MD;  [facility];  Service: Cardiovascular;  Laterality: N/A;   • CARDIAC CATHETERIZATION N/A 8/24/2022    Procedure: Percutaneous Coronary Intervention;  Surgeon: Jam Gavin MD;  Location: [facility] CATH INVASIVE LOCATION;  Service: Cardiovascular;  Laterality: N/A;   • CATARACT EXTRACTION, BILATERAL Bilateral 1995   • CORONARY ARTERY BYPASS GRAFT  1994    3 vessel   • EXTRACORPOREAL SHOCK WAVE LITHOTRIPSY (ESWL) Bilateral 2004   • TONSILLECTOMY           FAMILY  HISTORY  Family History   Problem Relation Age of Onset   • Diabetes Father    • Heart disease Father    • Hypertension Father    • Hyperlipidemia Father    • Hypertension Sister    • Hyperlipidemia Sister    • Heart disease Sister         CABG   • Diabetes Brother    • Heart disease Brother         CABG   • Hypertension Brother    • Hyperlipidemia Brother    • Prostate cancer Neg Hx          SOCIAL HISTORY  Social History     Socioeconomic History   • Marital status:    Tobacco Use   • Smoking status: Former     Packs/day: 0.50     Years: 12.00     Pack years: 6.00     Types: Cigarettes     Quit date:      Years since quittin.1   • Smokeless tobacco: Never   Substance and Sexual Activity   • Alcohol use: Never   • Drug use: Never   • Sexual activity: Yes     Partners: Female         ALLERGIES  Sulfa antibiotics        REVIEW OF SYSTEMS    All systems reviewed and marked as negative except as listed in HPI     PHYSICAL EXAM    I have reviewed the triage vital signs and nursing notes.    ED Triage Vitals [23 1334]   Temp Heart Rate Resp BP SpO2   98.5 °F (36.9 °C) 83 16 173/73 99 %      Temp src Heart Rate Source Patient Position BP Location FiO2 (%)   Tympanic Monitor -- -- --       Physical Exam  Constitutional:       General: He is not in acute distress.     Appearance: He is well-developed.   HENT:      Head: Normocephalic and atraumatic.      Jaw: No trismus.     Eyes:      General: No scleral icterus.     Conjunctiva/sclera: Conjunctivae normal.   Neck:      Trachea: No tracheal deviation.   Cardiovascular:      Rate and Rhythm: Normal rate and regular rhythm.   Pulmonary:      Effort: Pulmonary effort is normal.      Breath sounds: Normal breath sounds.   Abdominal:      Palpations: Abdomen is soft.      Tenderness: There is no abdominal tenderness. There is no guarding.   Musculoskeletal:         General: No deformity.      Cervical back: Normal range of motion. No spinous process  tenderness or muscular tenderness.   Lymphadenopathy:      Cervical: No cervical adenopathy.   Skin:     General: Skin is warm and dry.   Neurological:      Mental Status: He is alert and oriented to person, place, and time.      Sensory: Sensation is intact.      Motor: Motor function is intact.   Psychiatric:         Behavior: Behavior normal.         Vital signs and nursing notes reviewed.            LAB RESULTS  No results found for this or any previous visit (from the past 24 hour(s)).    Ordered the above labs and independently reviewed the results.        RADIOLOGY  CT Facial Bones Without Contrast, CT Head Without Contrast, CT Cervical Spine Without Contrast    Result Date: 3/6/2023  CT HEAD, FACIAL BONES AND CERVICAL SPINE WITHOUT CONTRAST  HISTORY: Fall, headache, neck pain.  COMPARISON: None.  CT HEAD WITHOUT CONTRAST:  There is moderate atrophy with atrophic changes involving predominantly the frontal and temporal lobes. There is volume loss with mild secondary ventriculomegaly. Moderate to severe vascular calcifications involving the carotid siphons are noted. There is no evidence of fracture or of intracranial hemorrhage. A small volume of fluid is present within the mastoid air cells bilaterally, more prominent on the right.      Atrophy, volume loss and vascular calcification are noted. There is no evidence of fracture or of intracranial hemorrhage. There is a small volume of fluid present within the mastoid air cells bilaterally, more prominent on the right.   CT EXAMINATION OF THE FACIAL BONES WITHOUT CONTRAST:  The frontal, ethmoid, sphenoid and maxillary sinuses are clear. There is no evidence of fracture.  IMPRESSION: No evidence of fracture.   CT EXAMINATION OF THE CERVICAL SPINE WITHOUT CONTRAST:  The alignment of the cervical spine is within normal limits. There is no evidence of prevertebral edema or fracture.  C2-3: A small central disc osteophyte complex is present with no evidence of  herniation.  C3-4: A small central disc bulge is present.  C4-5: Mild facet degenerative disease is present on the left.  C5-6: Mild to moderate facet degenerative disease is present on the left.  C6-7: There is no evidence of disc bulge or herniation.  C7-T1: There is no evidence of disc bulge or herniation.  IMPRESSION: Degenerative disease involving the cervical spine is noted as described. There is no evidence of fracture.   Radiation dose reduction techniques were utilized, including automated exposure control and exposure modulation based on body size.         I ordered the above noted radiological studies. Independently reviewed by me and discussed with radiologist.  See dictation above for official radiology interpretation.      PROCEDURES    Procedures        MEDICATIONS GIVEN IN ER    Medications   Tetanus-Diphth-Acell Pertussis (BOOSTRIX) injection 0.5 mL (0.5 mL Intramuscular Given 3/6/23 1552)         PROGRESS, DATA ANALYSIS, CONSULTS, AND MEDICAL DECISION MAKING    All labs have been independently interpreted by me.  All radiology studies have been interpreted by me.  Discussion below represents my analysis of pertinent findings related to patient's condition, differential diagnosis, treatment plan and final disposition.    Patient presentation and work-up consistent with uncomplicated facial injury causing several abrasions and contusions.  No evidence on CT scans of fractures to the facial bones, neck.  No sign of intracranial injury.  Plan to follow-up as an outpatient with his PCP for further management.    - Chronic or social conditions impacting care: None      DIFFERENTIAL DIAGNOSIS INCLUDE BUT NOT LIMITED TO:     Intracranial bleeding, concussion, scalp hematoma, facial contusion, facial abrasion, facial fracture, cervical spinal fracture, cervical strain.      Orders placed during this visit:  Orders Placed This Encounter   Procedures   • CT Facial Bones Without Contrast   • CT Head Without  Contrast   • CT Cervical Spine Without Contrast              AS OF 16:28 EST VITALS:    BP - 141/69  HR - 79  TEMP - 98.5 °F (36.9 °C) (Tympanic)  02 SATS - 99%    1629 I rechecked the patient.  I discussed the patient's radiology findings (including all incidental findings), diagnosis, and plan for discharge.  A repeat exam reveals no new worrisome changes from my initial exam findings.  The patient understands that the fact that they are being discharged does not denote that nothing is abnormal, it indicates that no clinical emergency is present and that they must follow-up as directed in order to properly maintain their health.  Follow-up instructions (specifically listed below) and return to ER precautions were given at this time.  I specifically instructed the patient to follow-up with their PCP.  The patient understands and agrees with the plan, and is ready for d/c.  All questions answered.        DIAGNOSIS  Final diagnoses:   CHI (closed head injury), initial encounter   Facial injury, initial encounter   Cervical strain, acute, initial encounter         DISPOSITION  D/c    Pt masked in first look. I wore a surgical mask throughout my encounters with the pt. I performed hand hygiene on entry into the pt room and upon exit.     Dictated utilizing Dragon dictation     Note Disclaimer: At [facility], we believe that sharing information builds trust and better relationships. You are receiving this note because you recently visited [facility]. It is possible you will see health information before a provider has talked with you about it. This kind of information can be easy to misunderstand. To help you fully understand what it means for your health, we urge you to discuss this note with your provider.      Jayme Baez PA  03/06/23 [id number]

## 2023-03-23 DIAGNOSIS — E11.9 TYPE 2 DIABETES MELLITUS WITHOUT COMPLICATION, WITHOUT LONG-TERM CURRENT USE OF INSULIN: ICD-10-CM

## 2023-03-23 RX ORDER — METFORMIN HYDROCHLORIDE 500 MG/1
500 TABLET, EXTENDED RELEASE ORAL 2 TIMES DAILY
Qty: 180 TABLET | Refills: 1 | Status: SHIPPED | OUTPATIENT
Start: 2023-03-23

## 2023-04-21 DIAGNOSIS — N40.1 BENIGN PROSTATIC HYPERPLASIA WITH URINARY HESITANCY: ICD-10-CM

## 2023-04-21 DIAGNOSIS — R39.11 BENIGN PROSTATIC HYPERPLASIA WITH URINARY HESITANCY: ICD-10-CM

## 2023-04-21 RX ORDER — TAMSULOSIN HYDROCHLORIDE 0.4 MG/1
CAPSULE ORAL
Qty: 90 CAPSULE | Refills: 0 | Status: SHIPPED | OUTPATIENT
Start: 2023-04-21

## 2023-04-22 DIAGNOSIS — E11.9 TYPE 2 DIABETES MELLITUS WITHOUT COMPLICATION, WITHOUT LONG-TERM CURRENT USE OF INSULIN: ICD-10-CM

## 2023-04-24 RX ORDER — METFORMIN HYDROCHLORIDE 500 MG/1
TABLET, EXTENDED RELEASE ORAL
Qty: 180 TABLET | Refills: 1 | Status: SHIPPED | OUTPATIENT
Start: 2023-04-24

## 2023-04-25 RX ORDER — CLOPIDOGREL BISULFATE 75 MG/1
75 TABLET ORAL DAILY
Qty: 30 TABLET | Refills: 11 | OUTPATIENT
Start: 2023-04-25

## 2023-04-25 NOTE — TELEPHONE ENCOUNTER
Per Dr. Littlejohn's note in 11/03/2022,  patient is to continue aspirin lifelong and plavix for one year

## 2023-05-06 DIAGNOSIS — I10 ESSENTIAL HYPERTENSION: ICD-10-CM

## 2023-05-06 DIAGNOSIS — N40.1 BENIGN PROSTATIC HYPERPLASIA WITH URINARY HESITANCY: ICD-10-CM

## 2023-05-06 DIAGNOSIS — I25.708 CORONARY ARTERY DISEASE OF BYPASS GRAFT OF NATIVE HEART WITH STABLE ANGINA PECTORIS: ICD-10-CM

## 2023-05-06 DIAGNOSIS — R39.11 BENIGN PROSTATIC HYPERPLASIA WITH URINARY HESITANCY: ICD-10-CM

## 2023-05-08 RX ORDER — TAMSULOSIN HYDROCHLORIDE 0.4 MG/1
CAPSULE ORAL
Qty: 90 CAPSULE | Refills: 0 | Status: SHIPPED | OUTPATIENT
Start: 2023-05-08

## 2023-05-08 RX ORDER — ROSUVASTATIN CALCIUM 20 MG/1
20 TABLET, COATED ORAL DAILY
Qty: 90 TABLET | Refills: 1 | Status: SHIPPED | OUTPATIENT
Start: 2023-05-08

## 2023-06-20 PROBLEM — Z95.5 STATUS POST INSERTION OF DRUG ELUTING CORONARY ARTERY STENT: Status: ACTIVE | Noted: 2023-06-20

## 2023-07-06 ENCOUNTER — TELEPHONE (OUTPATIENT)
Dept: FAMILY MEDICINE CLINIC | Facility: CLINIC | Age: 78
End: 2023-07-06

## 2023-07-06 PROBLEM — R07.9 CHEST PAIN: Status: ACTIVE | Noted: 2023-07-06

## 2023-07-06 NOTE — TELEPHONE ENCOUNTER
Caller: EDDIE HINTON    Relationship to patient: Emergency Contact    Chief complaint: SON STATES THAT PATIENT FELL A FEW DAYS AGO AND WAS SEEN IN THE EMERGENCY ROOM FOR A KNEE INJURY AND REFERRED TO ORTHOPEDICS.  SON STATES THAT PATIENT'S KNEE IS BRUISED AND VERY SWOLLEN.  SON STATES THAT THE PATIENT IS NOW NOT SLEEPING AND IS HAVING CHEST PAIN HAVING HAD TO TAKE A NITROGLYCERIN TABLET TODAY.   SON STATES THAT THEY ARE CONCERNED THAT THE PATIENT HAS A DVT/PULMONARY EMBOLISM/BLOOD CLOT.       SON WAS ADVISED TO TAKE THE PATIENT TO THE EMERGENCY ROOM AS THAT IS A RED FLAG AND SON AGREED STATING THEY WILL GO TO [facility].      Patient directed to call 911 or go to their nearest emergency room.     Patient verbalized understanding: [x] Yes  [] No  If no, why?

## 2023-07-31 ENCOUNTER — TRANSCRIBE ORDERS (OUTPATIENT)
Dept: PHYSICAL THERAPY | Facility: HOSPITAL | Age: 78
End: 2023-07-31
Payer: MEDICARE

## 2023-07-31 DIAGNOSIS — M25.561 RIGHT KNEE PAIN, UNSPECIFIED CHRONICITY: Primary | ICD-10-CM

## 2023-08-17 ENCOUNTER — HOSPITAL ENCOUNTER (OUTPATIENT)
Dept: PHYSICAL THERAPY | Facility: HOSPITAL | Age: 78
Setting detail: THERAPIES SERIES
Discharge: HOME | End: 2023-08-17
Payer: MEDICARE

## 2023-08-17 DIAGNOSIS — S83.521D SPRAIN OF POSTERIOR CRUCIATE LIGAMENT OF RIGHT KNEE, SUBSEQUENT ENCOUNTER: ICD-10-CM

## 2023-08-17 DIAGNOSIS — S82.101D CLOSED FRACTURE OF PROXIMAL END OF RIGHT TIBIA WITH ROUTINE HEALING, UNSPECIFIED FRACTURE MORPHOLOGY, SUBSEQUENT ENCOUNTER: ICD-10-CM

## 2023-08-17 DIAGNOSIS — M25.561 ACUTE PAIN OF RIGHT KNEE: Primary | ICD-10-CM

## 2023-08-17 PROCEDURE — 97161 PT EVAL LOW COMPLEX 20 MIN: CPT

## 2023-08-17 PROCEDURE — 97110 THERAPEUTIC EXERCISES: CPT

## 2023-08-17 NOTE — THERAPY EVALUATION
Outpatient Physical Therapy Ortho Initial Evaluation  [facility]     Patient Name: Kerrie Tyler  : 1945  MRN: [id number]  Today's Date: 2023      Visit Date: 2023    Patient Active Problem List   Diagnosis    Type 2 diabetes mellitus without complication, without long-term current use of insulin    Mixed hyperlipidemia    Essential hypertension    Coronary artery disease of bypass graft of native heart with stable angina pectoris    S/P CABG x 3    Benign prostatic hyperplasia with urinary hesitancy    Vitamin D deficiency    Presbycusis of both ears    Diabetic retinopathy associated with type 2 diabetes mellitus    Acute chest pain    Status post insertion of drug eluting coronary artery stent    Chest pain        Past Medical History:   Diagnosis Date    Kidney stone     Myocardial infarction         Past Surgical History:   Procedure Laterality Date    CARDIAC CATHETERIZATION N/A 2022    Procedure: Left Heart Cath  FEMORAL ACCESS;  Surgeon: Jam Gavin MD;  Location: [facility] CATH INVASIVE LOCATION;  Service: Cardiovascular;  Laterality: N/A;  Use femoral access- patient has bilateral CAMERON's with previous CABG    CARDIAC CATHETERIZATION N/A 2022    Procedure: Left ventriculography;  Surgeon: Jam Gavin MD;  Location: [facility] CATH INVASIVE LOCATION;  Service: Cardiovascular;  Laterality: N/A;    CARDIAC CATHETERIZATION N/A 2022    Procedure: Native mammary injection;  Surgeon: Jam Gavin MD;  Location: [facility] CATH INVASIVE LOCATION;  Service: Cardiovascular;  Laterality: N/A;    CARDIAC CATHETERIZATION  2022    Procedure: RESTING FULL CYCLE RATIO;  Surgeon: Jam Gavin MD;  Location: [facility] CATH INVASIVE LOCATION;  Service: Cardiovascular;;    CARDIAC CATHETERIZATION N/A 2022    Procedure: Stent LIZBET bypass graft;  Surgeon: Jam Gavin MD;  Location: [facility] CATH INVASIVE LOCATION;  Service: Cardiovascular;  Laterality: N/A;    CARDIAC  CATHETERIZATION N/A 8/24/2022    Procedure: Coronary angiography;  Surgeon: Jam Gavin MD;  Location:  [facility] CATH INVASIVE LOCATION;  Service: Cardiovascular;  Laterality: N/A;    CARDIAC CATHETERIZATION N/A 8/24/2022    Procedure: Percutaneous Coronary Intervention;  Surgeon: Jam Gavin MD;  Location:  [facility] CATH INVASIVE LOCATION;  Service: Cardiovascular;  Laterality: N/A;    CATARACT EXTRACTION, BILATERAL Bilateral 1995    CORONARY ARTERY BYPASS GRAFT  1994    3 vessel    EXTRACORPOREAL SHOCK WAVE LITHOTRIPSY (ESWL) Bilateral 2004    TONSILLECTOMY         Visit Dx:     ICD-10-CM ICD-9-CM   1. Acute pain of right knee  M25.561 719.46          Patient History       Row Name 08/17/23 0900             History    Chief Complaint Pain  -WILMAR      Type of Pain Hip pain;Knee pain  -WILMAR      Date Current Problem(s) Began 07/01/23  -WILMAR      Brief Description of Current Complaint Kerrie Tyler is a 78 y.o. male who presents to physical therapy today with primary compliant of R knee pain that began early July when he lost his balance causing him to fall in his bathroom. He reports mild chest pain immediately following fall and R lateral knee pain. X-rays performed in ED and negative for acute fracture but MD reviewed on 7/7/23 and reports posterior tibial plateau fracture laterally. Work up for chest pain in ED negative for cardiovascular involvement. MRI indicates avulsion fracture of posterior tibial intercondylar eminence at the distal PCL and sprain of PCL and all other ligaments intact. He also reports R anterior hip pain since the fall.  Kerrie Tyler reports difficulty/increased pain with navigating stairs, rising from a chair and when getting in/out of bed. Pain relieving factors include being less active and sitting. PMH includes chronic LBP, L shoulder pain, Type II DM, s/p CABG x 3 (1994), and HTN. Kerrie Tyler primary goal for attending skilled physical therapy is to return to walking program (2x/day  for 3 miles) and riding his stationary bike at home (30 minutes).  -WILMAR      Patient/Caregiver Goals Relieve pain;Return to prior level of function;Improve mobility;Improve strength;Know what to do to help the symptoms  -WILMAR      Occupation/sports/leisure activities retired ENT  -WILMAR      What clinical tests have you had for this problem? X-ray;MRI  -WILMAR      Results of Clinical Tests see epic  -WILMAR         Pain     Pain Location Knee  -WILMAR      Pain at Present 2  -WILMAR      Pain at Best 0  -WILMAR      Pain at Worst 6  -WILMAR      Pain Frequency Constant/continuous  -WILMAR      Is your sleep disturbed? No  -WILMAR         Fall Risk Assessment    Any falls in the past year: Yes  -WILMAR      Number of falls reported in the last 12 months 1  -WILMAR      Factors that contributed to the fall: Lost balance  -WILMAR                User Key  (r) = Recorded By, (t) = Taken By, (c) = Cosigned By      Initials Name Provider Type    Isabelle Acevedo, PT Physical Therapist                     PT Ortho       Row Name 08/17/23 0900       Special Tests/Palpation    Special Tests/Palpation Knee  -WILMAR       Hip/Thigh Palpation    Hip/Thigh Palpation? Yes  -WILMAR    Iliopsoas Right:;Tender  -WILMAR    Adductors Right:;Tender  -WILMAR       Knee Palpation    Knee Palpation? Yes  -WILMAR    Lateral Joint Line Right:;Tender  -WILMAR    Posterior Joint Line Right:;Tender  -WILMAR    Fibular Head Right:;Tender  -WILMAR       Patellar Accessory Motions    Patellar Accessory Motions Tested? Yes  -WILMAR    Superior glide Right:;WNL  -WILMAR    Inferior glide Right:;WNL  -WILMAR    Medial glide Right:;WNL  -WILMAR    Lateral glide Right:;WNL  -WILMAR       Knee Special Tests    Anterior drawer (ACL lesion) Right:;Negative  -WILMAR    Posterior drawer (PCL lesion) Right:;Positive  mild laxity  -WILMAR    Posterior sag sign (PCL lesion) Right:;Negative  -WILMAR    Valgus stress (MCL lesion) Right:;Negative  -WILMAR    Varus stress (LCL lesion) Right:;Negative  -WILMAR    Thessaly test (meniscal lesion) Right:;Negative  -WILMAR     Brenda's test (meniscal lesion) Right:;Negative  -WILMAR    Bounce home test (meniscal lesion) Right:;Negative  -WILMAR       General ROM    RT Lower Ext Rt Knee Extension/Flexion  -WILMAR    LT Lower Ext Lt Knee Extension/Flexion  -WILMAR       Right Lower Ext    Rt Knee Extension/Flexion AROM lacking 4-115  -WILMAR       Left Lower Ext    Lt Knee Extension/Flexion AROM hyperextension 3-135  -WILMAR       MMT (Manual Muscle Testing)    Rt Lower Ext Rt Hip Flexion;Rt Knee Extension;Rt Knee Flexion;Rt Ankle Dorsiflexion  -WILMAR    Lt Lower Ext Lt Hip Flexion;Lt Knee Extension;Lt Knee Flexion;Lt Ankle Dorsiflexion  -WILMAR       MMT Right Lower Ext    Rt Hip Flexion MMT, Gross Movement (3+/5) fair plus  noted pain on R anterior hip/thigh  -WILMAR    Rt Knee Extension MMT, Gross Movement (4+/5) good plus  -WILMAR    Rt Knee Flexion MMT, Gross Movement (4-/5) good minus  -WILMAR    Rt Ankle Dorsiflexion MMT, Gross Movement (4+/5) good plus  -WILMAR       MMT Left Lower Ext    Lt Hip Flexion MMT, Gross Movement (4/5) good  -WILMAR    Lt Knee Extension MMT, Gross Movement (4+/5) good plus  -WILMAR    Lt Knee Flexion MMT, Gross Movement (4/5) good  -WILMAR    Lt Ankle Dorsiflexion MMT, Gross Movement (4+/5) good plus  -WILMAR       Sensation    Sensation WNL? WNL  -WILMAR       Flexibility    Flexibility Tested? Lower Extremity  -WILMAR       Lower Extremity Flexibility    Hamstrings Bilateral:;Mildly limited;Moderately limited  -WILMAR    Hip Flexors Bilateral:;Mildly limited;Moderately limited  -WILMAR    Quadriceps Bilateral:;Mildly limited;Moderately limited  -WILMAR    Hip External Rotators Bilateral:;Mildly limited;Moderately limited  -WILMAR    Gastrocnemius Bilateral:;Mildly limited;Moderately limited  -WILMAR       Balance Skills Training    SLS B approximately 5 sec  -WILMAR       Gait/Stairs (Locomotion)    Comment, (Gait/Stairs) mild antalgic gait, B foot flat at IC, dec knee flexion during swing, minimal trunk rotation, inc toe sign on R  -WILMAR              User Key  (r) = Recorded By, (t) = Taken By,  (c) = Cosigned By      Initials Name Provider Type    Isabelle Acevedo, PT Physical Therapist                                Therapy Education  Education Details: Educated on anatomy/pathology, evaluation findings, therapy expectations, healing time frames, POC and HEP Access Code [email or web address]  Given: HEP, Symptoms/condition management, Pain management, Posture/body mechanics  Program: New  How Provided: Verbal, Demonstration, Written  Provided to: Patient  Level of Understanding: Verbalized, Demonstrated  90802 - PT Self Care/Mgmt Minutes: 5      PT OP Goals       Row Name 08/17/23 1000          PT Short Term Goals    STG Date to Achieve 09/16/23  -WILMAR     STG 1 Pt will be independent with initial HEP to improve strength/ROM and decrease pain.  -     STG 1 Progress New  -     STG 2 Patient will report 25-50% reduction in R hip/knee pain when performing hip flexion based activities within his ADLs to indicate improved activity tolerance and return to PLOF.  -     STG 2 Progress New  -     STG 3 Pt performs 30 seconds sit to stand having complete at least 2 more repetitions that was performed upon initial evaluation for progression of ease with functional transfers, LE strength, and safety in the home.  -     STG 3 Progress New  -        Long Term Goals    LTG Date to Achieve 10/16/23  -     LTG 1 Pt will be independent with advance HEP to improve strength/ROM and decrease pain.  -     LTG 1 Progress New  -     LTG 2 Pt will improve R knee AROM from lacking 3-115 degrees to 0-120 degrees to improve ability to navigate stairs.  -     LTG 2 Progress New  -     LTG 3 Pt will improve KOS score to 85% ability to show improved quality of life.  -     LTG 3 Progress New  -     LTG 4 Pt performs 30 seconds sit to stand having complete at least 3-4 more repetitions that was performed upon initial evaluation for progression of ease with functional transfers, LE strength, and safety in the home.   -     LTG 4 Progress New  -     LTG 5 Patient able to tandem stance bilaterally with 1 finger hold > 30 seconds without LOB for improved core stabilization and decreased fall risk  -     LTG 5 Progress New  -        Time Calculation    PT Goal Re-Cert Due Date 11/15/23  -               User Key  (r) = Recorded By, (t) = Taken By, (c) = Cosigned By      Initials Name Provider Type    Isabelle Acevedo, PT Physical Therapist                     PT Assessment/Plan       Row Name 08/17/23 1000          PT Assessment    Functional Limitations Limitation in home management;Limitations in community activities;Impaired gait;Performance in leisure activities  -     Impairments Gait;Impaired flexibility;Impaired muscle endurance;Impaired muscle length;Impaired muscle power;Joint mobility;Muscle strength;Pain;Poor body mechanics;Posture;Range of motion;Balance;Joint integrity  -     Assessment Comments Kerrie Tyler is a 78 y.o. male referred to physical therapy for R knee pain. X-rays performed in ED and negative for acute fracture but MD reviewed on 7/7/23 and reports posterior tibial plateau fracture laterally. Work up for chest pain in ED negative for cardiovascular involvement. MRI indicates avulsion fracture of posterior tibial intercondylar eminence at the distal PCL and sprain of PCL and all other ligaments intact. He also reports R anterior hip pain since the fall.  He presents with a stable clinical presentation, along with comorbidities of chronic LBP, L shoulder pain, Type II DM, s/p CABG x 3 (1994), and HTN and no remarkable personal factors that may impact his progress in the plan of care. Pt presents today with impaired gait, dec SLS time, difficulty rising from clinic chair and able to perform 3 STS in 30 seconds, dec R knee active flexion/extension ROM, LE weakness and mild PCL laxity. His signs and symptoms are consistent with referring diagnosis. The previous impairments limit his  ability to navigate stairs without limitation, rise from a chair without discomfort and bend knee/hip with various functional tasks without pain. Patients KOS outcome measure indicates 74% ability, where 100% represents no disablity. Pt will benefit from skilled PT to address the previous impairments and return to PLOF.  -WILMAR     Please refer to paper survey for additional self-reported information Yes  -WILMAR     Rehab Potential Good  -WILMAR     Patient/caregiver participated in establishment of treatment plan and goals Yes  -WILMAR     Patient would benefit from skilled therapy intervention Yes  -WILMAR        PT Plan    PT Frequency 2x/week  -WILMAR     Predicted Duration of Therapy Intervention (PT) 8-10 visits  -WILMAR     Planned CPT's? PT EVAL LOW COMPLEXITY: 96052;PT RE-EVAL: 99652;PT THER PROC EA 15 MIN: 22343;PT THER ACT EA 15 MIN: 88112;PT MANUAL THERAPY EA 15 MIN: 75787;PT NEUROMUSC RE-EDUCATION EA 15 MIN: 44154;PT GAIT TRAINING EA 15 MIN: 43291;PT SELF CARE/HOME MGMT/TRAIN EA 15: 36349;PT HOT OR COLD PACK TREAT MCARE;PT ELECTRICAL STIM UNATTEND:   -WILMAR     PT Plan Comments response to evaluation, warm up on nustep, consider gentle HF/quad stretch, knee flexion stretch, HL marches, SL clam shells, STS, HR, HS curls, lateral stepping, small step up  -WILMAR               User Key  (r) = Recorded By, (t) = Taken By, (c) = Cosigned By      Initials Name Provider Type    Isabelle Acevedo, PT Physical Therapist                       OP Exercises       Row Name 08/17/23 1000             Total Minutes    51655 - PT Therapeutic Exercise Minutes 10  -WILMAR         Exercise 1    Exercise Name 1 nustep  -WILMAR      Additional Comments next visit  -WILMAR         Exercise 2    Exercise Name 2 supine QS  -WILMAR      Cueing 2 Verbal;Tactile  -WILMAR      Sets 2 1  -WILMAR      Reps 2 15  -WILMAR      Time 2 5s  -WILMAR         Exercise 3    Exercise Name 3 glute bridge  -WILMAR      Cueing 3 Verbal  -WILMAR      Reps 3 10  -WILMAR         Exercise 4    Exercise Name 4  seated HS stretch  -WILMAR      Cueing 4 Verbal;Demo  -WILMAR      Reps 4 3  -WILMAR      Time 4 20s  -WILMAR         Exercise 5    Exercise Name 5 LAQ  -WILMAR      Cueing 5 Verbal;Demo  -WILMAR      Sets 5 1  -WILMAR      Reps 5 15  -WILMAR                User Key  (r) = Recorded By, (t) = Taken By, (c) = Cosigned By      Initials Name Provider Type    Isabelle Acevedo, PT Physical Therapist                                  Outcome Measure Options: 30 Second Chair Stand Test, Knee Outcome Score- ADL  30 Second Chair Stand Test  30 Second Chair Stand Test: 4 (clinic chair no UE)  Knee Outcome Survey Activities of Daily Living Scale  Symptoms: Pain: The symptom affects my activity slightly  Symptoms: Stiffness: The symptom affects my activity slightly  Symptoms: Swelling: I do not have the symptom  Symptoms: Giving way, buckling, or shifting of the knee: The symptom affects my activity slightly  Symptoms: Weakness: The symptom affects my activity slightly  Symptoms: Limping: I do not have the symptom  Functional Limitations with ADL's: Walk: Activity is minimally difficult  Functional Limitations with ADL's: Go up stairs: Activity is minimally difficult  Functional Limitations with ADL's: Go down stairs: Activity is minimally difficult  Functional Limitations with ADL's: Stand: Activity is minimally difficult  Functional Limitations with ADL's: Kneel on front of your knee: Activity is minimally difficult  Functional Limitations with ADL's: Squat: Activity is minimally difficult  Functional Limitations with ADL's: Sit with your knee bent: Activity is somewhat difficult  Functional Limitations with ADL's: Rise from a chair: Activity is somewhat difficult  Knee Outcome Summary ADL's Score: 74.29 %      Time Calculation:     Start Time: 0920  Stop Time: 1000  Time Calculation (min): 40 min  Timed Charges  01139 - PT Therapeutic Exercise Minutes: 10  05234 - PT Self Care/Mgmt Minutes: 5  Untimed Charges  PT Eval/Re-eval Minutes: 25  Total  Minutes  Timed Charges Total Minutes: 15  Untimed Charges Total Minutes: 25   Total Minutes: 40     Therapy Charges for Today       Code Description Service Date Service Provider Modifiers Qty    85865825876 HC PT THER PROC EA 15 MIN 8/17/2023 Isabelle Belle, PT GP 1    [id number] HC PT EVAL LOW COMPLEXITY 2 8/17/2023 Isabelle Belle, PT GP 1            PT G-Codes  Outcome Measure Options: 30 Second Chair Stand Test, Knee Outcome Score- ADL         Isabelle Belle, PT  8/17/2023

## 2023-08-21 ENCOUNTER — HOSPITAL ENCOUNTER (OUTPATIENT)
Dept: PHYSICAL THERAPY | Facility: HOSPITAL | Age: 78
Setting detail: THERAPIES SERIES
Discharge: HOME | End: 2023-08-21
Payer: MEDICARE

## 2023-08-21 PROCEDURE — 97110 THERAPEUTIC EXERCISES: CPT

## 2023-08-21 NOTE — THERAPY TREATMENT NOTE
Outpatient Physical Therapy Ortho Treatment Note  [facility]     Patient Name: Kerrie Tyler  : 1945  MRN: [id number]  Today's Date: 2023      Visit Date: 2023    Visit Dx:  No diagnosis found.    Patient Active Problem List   Diagnosis    Type 2 diabetes mellitus without complication, without long-term current use of insulin    Mixed hyperlipidemia    Essential hypertension    Coronary artery disease of bypass graft of native heart with stable angina pectoris    S/P CABG x 3    Benign prostatic hyperplasia with urinary hesitancy    Vitamin D deficiency    Presbycusis of both ears    Diabetic retinopathy associated with type 2 diabetes mellitus    Acute chest pain    Status post insertion of drug eluting coronary artery stent    Chest pain        Past Medical History:   Diagnosis Date    Kidney stone     Myocardial infarction         Past Surgical History:   Procedure Laterality Date    CARDIAC CATHETERIZATION N/A 2022    Procedure: Left Heart Cath  FEMORAL ACCESS;  Surgeon: Jam Gavin MD;  Location: [facility] CATH INVASIVE LOCATION;  Service: Cardiovascular;  Laterality: N/A;  Use femoral access- patient has bilateral CAMERON's with previous CABG    CARDIAC CATHETERIZATION N/A 2022    Procedure: Left ventriculography;  Surgeon: Jam Gavin MD;  Location: [facility] CATH INVASIVE LOCATION;  Service: Cardiovascular;  Laterality: N/A;    CARDIAC CATHETERIZATION N/A 2022    Procedure: Native mammary injection;  Surgeon: Jam Gavin MD;  Location: [facility] CATH INVASIVE LOCATION;  Service: Cardiovascular;  Laterality: N/A;    CARDIAC CATHETERIZATION  2022    Procedure: RESTING FULL CYCLE RATIO;  Surgeon: Jam Gavin MD;  Location: [facility] CATH INVASIVE LOCATION;  Service: Cardiovascular;;    CARDIAC CATHETERIZATION N/A 2022    Procedure: Stent LIZBET bypass graft;  Surgeon: Jam Gavin MD;  Location: [facility] CATH INVASIVE LOCATION;  Service: Cardiovascular;   Laterality: N/A;    CARDIAC CATHETERIZATION N/A 8/24/2022    Procedure: Coronary angiography;  Surgeon: Jam Gavin MD;  Location:  [facility] CATH INVASIVE LOCATION;  Service: Cardiovascular;  Laterality: N/A;    CARDIAC CATHETERIZATION N/A 8/24/2022    Procedure: Percutaneous Coronary Intervention;  Surgeon: Jam Gavin MD;  Location:  [facility] CATH INVASIVE LOCATION;  Service: Cardiovascular;  Laterality: N/A;    CATARACT EXTRACTION, BILATERAL Bilateral 1995    CORONARY ARTERY BYPASS GRAFT  1994    3 vessel    EXTRACORPOREAL SHOCK WAVE LITHOTRIPSY (ESWL) Bilateral 2004    TONSILLECTOMY                          PT Assessment/Plan       Row Name 08/21/23 1700          PT Assessment    Assessment Comments 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 Initiated several additional strengthening exercises this date, completed with good tolerance. Additionally added hip mobility and stretching to address tight musculature BL, pt reports improvement in symptoms following. Frequent cueing for slow and controlled motion. Plan to transition to standing, more fxl strengthening positions soon.  -MO        PT Plan    PT Plan Comments lateral stepping, step up, HS curl  -MO               User Key  (r) = Recorded By, (t) = Taken By, (c) = Cosigned By      Initials Name Provider Type    Kristina King, PT Physical Therapist                       OP Exercises       Row Name 08/21/23 1600             Subjective Comments    Subjective Comments Feeling good today, no changes since eval. Able [place] walk [place]  -MO         Total Minutes    50184 - PT Therapeutic Exercise Minutes 45  -MO         Exercise 1    Exercise Name 1 nustep  -MO      Time 1 5 mins  -MO      Additional Comments lvl 2  -MO         Exercise 2    Exercise Name 2 supine QS  -MO      Cueing 2 Verbal;Tactile  -MO      Sets 2 1  -MO      Reps 2 15  -MO       Time 2 5s  -MO         Exercise 3    Exercise Name 3 glute bridge  -MO      Cueing 3 Verbal  -MO      Reps 3 10  -MO      Time 3 3s  -MO      Additional Comments Cueing for slow up/down  -MO         Exercise 4    Exercise Name 4 seated HS stretch  -MO      Cueing 4 Verbal;Demo  -MO      Reps 4 3  -MO      Time 4 20s  -MO         Exercise 5    Exercise Name 5 LAQ  -MO      Cueing 5 Verbal;Demo  -MO      Sets 5 1  -MO      Reps 5 15  -MO         Exercise 6    Exercise Name 6 QS + SLR  -MO      Cueing 6 Verbal  -MO      Sets 6 2 BL  -MO      Reps 6 10  -MO         Exercise 7    Exercise Name 7 Clamshell  -MO      Cueing 7 Verbal;Tactile;Demo  -MO      Sets 7 2  -MO      Reps 7 10  -MO         Exercise 8    Exercise Name 8 Supine marches  -MO      Cueing 8 Verbal;Tactile;Demo  -MO      Sets 8 1 BL  -MO      Reps 8 20  -MO         Exercise 9    Exercise Name 9 STS  -MO      Cueing 9 Verbal  -MO      Sets 9 1  -MO      Reps 9 10  -MO      Additional Comments Cueing for set up  -MO         Exercise 10    Exercise Name 10 Hip flexor stretch at stairs  -MO      Cueing 10 Verbal;Demo  -MO      Sets 10 2 BL  -MO      Time 10 30s  -MO         Exercise 11    Exercise Name 11 Supine figure 4 IR stretch and piriformis stretch  -MO      Cueing 11 Verbal;Tactile;Demo  -MO      Sets 11 2 each stretch, BL  -MO      Time 11 30s  -MO                User Key  (r) = Recorded By, (t) = Taken By, (c) = Cosigned By      Initials Name Provider Type    Kristina King, PT Physical Therapist                                  PT OP Goals       Row Name 08/21/23 1600          PT Short Term Goals    STG Date to Achieve 09/16/23  -MO     STG 1 Pt will be independent with initial HEP to improve strength/ROM and decrease pain.  -MO     STG 1 Progress Ongoing  -MO     STG 2 Patient will report 25-50% reduction in R hip/knee pain when performing hip flexion based activities within his ADLs to indicate improved activity tolerance and return to PLOF.  -MO      STG 2 Progress Ongoing  -MO     STG 3 Pt performs 30 seconds sit to stand having complete at least 2 more repetitions that was performed upon initial evaluation for progression of ease with functional transfers, LE strength, and safety in the home.  -MO     STG 3 Progress Ongoing  -MO        Long Term Goals    LTG Date to Achieve 10/16/23  -MO     LTG 1 Pt will be independent with advance HEP to improve strength/ROM and decrease pain.  -MO     LTG 1 Progress Ongoing  -MO     LTG 2 Pt will improve R knee AROM from lacking 3-115 degrees to 0-120 degrees to improve ability to navigate stairs.  -MO     LTG 2 Progress Ongoing  -MO     LTG 3 Pt will improve KOS score to 85% ability to show improved quality of life.  -MO     LTG 3 Progress Ongoing  -MO     LTG 4 Pt performs 30 seconds sit to stand having complete at least 3-4 more repetitions that was performed upon initial evaluation for progression of ease with functional transfers, LE strength, and safety in the home.  -MO     LTG 4 Progress Ongoing  -MO     LTG 5 Patient able to tandem stance bilaterally with 1 finger hold > 30 seconds without LOB for improved core stabilization and decreased fall risk  -MO     LTG 5 Progress Ongoing  -MO               User Key  (r) = Recorded By, (t) = Taken By, (c) = Cosigned By      Initials Name Provider Type    Kristina King, PT Physical Therapist                    Therapy Education  Education Details: Encouraged daily HEP stretching, every other day for strength exercises.  Given: HEP  Program: New, Reinforced  How Provided: Verbal, Demonstration, Written  Provided to: Patient  Level of Understanding: Verbalized, Demonstrated              Time Calculation:   Start Time: 1615  Stop Time: 1700  Time Calculation (min): 45 min  Timed Charges  37284 - PT Therapeutic Exercise Minutes: 45  Total Minutes  Timed Charges Total Minutes: 45   Total Minutes: 45  Therapy Charges for Today       Code Description Service Date Service  Provider Modifiers Qty    [id number]  PT THER PROC EA 15 MIN 8/21/2023 Kristina Villalba, PT GP 3                      Kristina Villalba, PT  8/21/2023

## 2023-08-22 ENCOUNTER — TELEPHONE (OUTPATIENT)
Dept: FAMILY MEDICINE CLINIC | Facility: CLINIC | Age: 78
End: 2023-08-22

## 2023-08-22 NOTE — TELEPHONE ENCOUNTER
Caller: EDDIE HINTON     Relationship: [unfilled]     Best call back number: [phone]     What is your medical concern? PATIENT'S SON IS CALLING TO ASK IF DR ARMENDARIZ WOULD ORDER A URINALYSIS.  HE STATES 2 WEEKS AGO, PATIENT NOTICED A BROWNISH COLOR IN HIS URINE.  HE STATES THAT AGAIN TODAY, PATIENT SAW THE BROWNISH COLOR IN HIS URINE.  HE STATES PATIENT HAS NO OTHER SYMPTOMS.     How long has this issue been going on? 2 WEEKS    Is your provider already aware of this issue? NO.    Have you been treated for this issue? 2 WEEKS AGO, AND AGAIN THEN AGAIN TODAY.    PLEASE ADVISE.

## 2023-08-23 ENCOUNTER — HOSPITAL ENCOUNTER (EMERGENCY)
Facility: HOSPITAL | Age: 78
Discharge: HOME | End: 2023-08-23
Attending: EMERGENCY MEDICINE
Payer: MEDICARE

## 2023-08-23 ENCOUNTER — APPOINTMENT (OUTPATIENT)
Dept: CT IMAGING | Facility: HOSPITAL | Age: 78
End: 2023-08-23
Payer: MEDICARE

## 2023-08-23 VITALS
WEIGHT: 150 LBS | BODY MASS INDEX: 24.11 KG/M2 | TEMPERATURE: 96.8 F | HEART RATE: 62 BPM | RESPIRATION RATE: 16 BRPM | SYSTOLIC BLOOD PRESSURE: 124 MMHG | HEIGHT: 66 IN | OXYGEN SATURATION: 96 % | DIASTOLIC BLOOD PRESSURE: 68 MMHG

## 2023-08-23 DIAGNOSIS — N20.0 NEPHROLITHIASIS: ICD-10-CM

## 2023-08-23 DIAGNOSIS — R31.9 HEMATURIA, UNSPECIFIED TYPE: Primary | ICD-10-CM

## 2023-08-23 LAB
ALBUMIN SERPL-MCNC: 4.5 G/DL (ref 3.5–5.2)
ALBUMIN/GLOB SERPL: 1.7 G/DL
ALP SERPL-CCNC: 64 U/L (ref 39–117)
ALT SERPL W P-5'-P-CCNC: 17 U/L (ref 1–41)
ANION GAP SERPL CALCULATED.3IONS-SCNC: 13 MMOL/L (ref 5–15)
AST SERPL-CCNC: 16 U/L (ref 1–40)
BACTERIA UR QL AUTO: ABNORMAL /HPF
BASOPHILS # BLD AUTO: 0.05 10*3/MM3 (ref 0–0.2)
BASOPHILS NFR BLD AUTO: 0.8 % (ref 0–1.5)
BILIRUB SERPL-MCNC: 0.3 MG/DL (ref 0–1.2)
BILIRUB UR QL STRIP: NEGATIVE
BILIRUB UR QL STRIP: NEGATIVE
BUN SERPL-MCNC: 13 MG/DL (ref 8–23)
BUN/CREAT SERPL: 13.8 (ref 7–25)
CALCIUM SPEC-SCNC: 10.2 MG/DL (ref 8.6–10.5)
CHLORIDE SERPL-SCNC: 103 MMOL/L (ref 98–107)
CK SERPL-CCNC: 51 U/L (ref 20–200)
CLARITY UR: CLEAR
CLARITY UR: CLEAR
CO2 SERPL-SCNC: 24 MMOL/L (ref 22–29)
COLOR UR: YELLOW
COLOR UR: YELLOW
CREAT SERPL-MCNC: 0.94 MG/DL (ref 0.76–1.27)
DEPRECATED RDW RBC AUTO: 39.8 FL (ref 37–54)
EGFRCR SERPLBLD CKD-EPI 2021: 82.5 ML/MIN/1.73
EOSINOPHIL # BLD AUTO: 0.08 10*3/MM3 (ref 0–0.4)
EOSINOPHIL NFR BLD AUTO: 1.3 % (ref 0.3–6.2)
ERYTHROCYTE [DISTWIDTH] IN BLOOD BY AUTOMATED COUNT: 13.1 % (ref 12.3–15.4)
GLOBULIN UR ELPH-MCNC: 2.7 GM/DL
GLUCOSE SERPL-MCNC: 161 MG/DL (ref 65–99)
GLUCOSE UR STRIP-MCNC: NEGATIVE MG/DL
GLUCOSE UR STRIP-MCNC: NEGATIVE MG/DL
HCT VFR BLD AUTO: 40.6 % (ref 37.5–51)
HGB BLD-MCNC: 13.3 G/DL (ref 13–17.7)
HGB UR QL STRIP.AUTO: ABNORMAL
HGB UR QL STRIP.AUTO: ABNORMAL
HOLD SPECIMEN: NORMAL
HOLD SPECIMEN: NORMAL
HYALINE CASTS UR QL AUTO: ABNORMAL /LPF
IMM GRANULOCYTES # BLD AUTO: 0.01 10*3/MM3 (ref 0–0.05)
IMM GRANULOCYTES NFR BLD AUTO: 0.2 % (ref 0–0.5)
KETONES UR QL STRIP: NEGATIVE
KETONES UR QL STRIP: NEGATIVE
LEUKOCYTE ESTERASE UR QL STRIP.AUTO: NEGATIVE
LEUKOCYTE ESTERASE UR QL STRIP.AUTO: NEGATIVE
LYMPHOCYTES # BLD AUTO: 1.88 10*3/MM3 (ref 0.7–3.1)
LYMPHOCYTES NFR BLD AUTO: 29.6 % (ref 19.6–45.3)
MCH RBC QN AUTO: 27.5 PG (ref 26.6–33)
MCHC RBC AUTO-ENTMCNC: 32.8 G/DL (ref 31.5–35.7)
MCV RBC AUTO: 83.9 FL (ref 79–97)
MONOCYTES # BLD AUTO: 0.37 10*3/MM3 (ref 0.1–0.9)
MONOCYTES NFR BLD AUTO: 5.8 % (ref 5–12)
NEUTROPHILS NFR BLD AUTO: 3.97 10*3/MM3 (ref 1.7–7)
NEUTROPHILS NFR BLD AUTO: 62.3 % (ref 42.7–76)
NITRITE UR QL STRIP: NEGATIVE
NITRITE UR QL STRIP: NEGATIVE
NRBC BLD AUTO-RTO: 0 /100 WBC (ref 0–0.2)
PH UR STRIP.AUTO: 6 [PH] (ref 5–8)
PH UR STRIP.AUTO: 6 [PH] (ref 5–8)
PLATELET # BLD AUTO: 167 10*3/MM3 (ref 140–450)
PMV BLD AUTO: 10.8 FL (ref 6–12)
POTASSIUM SERPL-SCNC: 4.4 MMOL/L (ref 3.5–5.2)
PROT SERPL-MCNC: 7.2 G/DL (ref 6–8.5)
PROT UR QL STRIP: ABNORMAL
PROT UR QL STRIP: ABNORMAL
RBC # BLD AUTO: 4.84 10*6/MM3 (ref 4.14–5.8)
RBC # UR STRIP: ABNORMAL /HPF
REF LAB TEST METHOD: ABNORMAL
SODIUM SERPL-SCNC: 140 MMOL/L (ref 136–145)
SP GR UR STRIP: 1.01 (ref 1–1.03)
SP GR UR STRIP: 1.01 (ref 1–1.03)
SQUAMOUS #/AREA URNS HPF: ABNORMAL /HPF
UROBILINOGEN UR QL STRIP: ABNORMAL
UROBILINOGEN UR QL STRIP: ABNORMAL
WBC # UR STRIP: ABNORMAL /HPF
WBC NRBC COR # BLD: 6.36 10*3/MM3 (ref 3.4–10.8)
WHOLE BLOOD HOLD COAG: NORMAL
WHOLE BLOOD HOLD SPECIMEN: NORMAL

## 2023-08-23 PROCEDURE — 81001 URINALYSIS AUTO W/SCOPE: CPT

## 2023-08-23 PROCEDURE — 99284 EMERGENCY DEPT VISIT MOD MDM: CPT

## 2023-08-23 PROCEDURE — 74176 CT ABD & PELVIS W/O CONTRAST: CPT

## 2023-08-23 PROCEDURE — 80053 COMPREHEN METABOLIC PANEL: CPT | Performed by: EMERGENCY MEDICINE

## 2023-08-23 PROCEDURE — 36415 COLL VENOUS BLD VENIPUNCTURE: CPT

## 2023-08-23 PROCEDURE — 85025 COMPLETE CBC W/AUTO DIFF WBC: CPT | Performed by: EMERGENCY MEDICINE

## 2023-08-23 PROCEDURE — 82550 ASSAY OF CK (CPK): CPT | Performed by: EMERGENCY MEDICINE

## 2023-08-23 RX ORDER — SODIUM CHLORIDE 0.9 % (FLUSH) 0.9 %
10 SYRINGE (ML) INJECTION AS NEEDED
Status: DISCONTINUED | OUTPATIENT
Start: 2023-08-23 | End: 2023-08-23 | Stop reason: HOSPADM

## 2023-08-23 NOTE — ED TRIAGE NOTES
Pt to ED from home with c/o blood in urine. Pt has a sample, urine is red tinges with sediment. Pt denies increased frequency, burning. Pt's states mild pressure en the bladder area but denies and pain.

## 2023-08-23 NOTE — ED PROVIDER NOTES
EMERGENCY DEPARTMENT ENCOUNTER    Room Number:  39/39  Date of encounter:  8/23/2023  PCP: Mayito Sepulveda MD  Historian: Patient    Patient was placed in face mask during triage process. Patient was wearing facemask when I entered the room and throughout our encounter. I wore full protective equipment throughout this patient encounter including a face mask, eye protection, and gloves. Hand hygiene was performed before donning protective equipment and again following doffing of PPE after leaving the room.    HPI:  Chief Complaint: Hematuria  A complete HPI/ROS/PMH/PSH/SH/FH are unobtainable due to: N/A   Context: Kerrie Tyler is a 78 y.o. male who presents to the ED c/o asymptomatic hematuria that initially noted about 3 weeks ago but was only transient.  It has recurred and been present since yesterday.  No dysuria or urinary urgency nor significant abdominal discomfort reported with this.  Patient has a very remote history of kidney stones but has no associated flank pain.  No fevers nausea or vomiting reported.  Patient is on statin.    MEDICAL HISTORY REVIEW  EMR reviewed:    Cardiology consult note 7/6/2023 by Dr. Littlejohn reviewed:  Patient with history of coronary artery disease presents with chest discomfort.  EKG and biomarkers reviewed and reassuring.  Plan for future perfusion stress test if has recurrent episodes of chest discomfort.    PAST MEDICAL HISTORY  Active Ambulatory Problems     Diagnosis Date Noted    Type 2 diabetes mellitus without complication, without long-term current use of insulin 12/16/2020    Mixed hyperlipidemia 12/16/2020    Essential hypertension 12/16/2020    Coronary artery disease of bypass graft of native heart with stable angina pectoris 12/16/2020    S/P CABG x 3 12/16/2020    Benign prostatic hyperplasia with urinary hesitancy 09/14/2021    Vitamin D deficiency 09/14/2021    Presbycusis of both ears 09/14/2021    Diabetic retinopathy associated with type 2 diabetes  mellitus 09/14/2021    Acute chest pain 08/23/2022    Status post insertion of drug eluting coronary artery stent 06/20/2023    Chest pain 07/06/2023     Resolved Ambulatory Problems     Diagnosis Date Noted    No Resolved Ambulatory Problems     Past Medical History:   Diagnosis Date    Kidney stone     Myocardial infarction          PAST SURGICAL HISTORY  Past Surgical History:   Procedure Laterality Date    CARDIAC CATHETERIZATION N/A 8/24/2022    Procedure: Left Heart Cath  FEMORAL ACCESS;  Surgeon: Jam Gavin MD;  Location: [facility] CATH INVASIVE LOCATION;  Service: Cardiovascular;  Laterality: N/A;  Use femoral access- patient has bilateral CAMERON's with previous CABG    CARDIAC CATHETERIZATION N/A 8/24/2022    Procedure: Left ventriculography;  Surgeon: Jam Gavin MD;  Location: [facility] C[facility]VASIVE LOC[facility];  Service: Cardiovascular;  Laterality: N/A;    CARDIAC CATHETERIZATION N/A 8/24/2022    Procedure: Native mammary injection;  Surgeon: Jam Gavin MD;  [facility];  Service: Cardiovascular;  Laterality: N/A;    CARDIAC CATHETERIZATION  8/24/2022    Procedure: RESTING FULL CYCLE RATIO;  Surgeon: Jam Gavin MD;  [facility];  Service: Cardiovascular;;    CARDIAC CATHETERIZATION N/A 8/24/2022    Procedure: Stent LIZBET bypass graft;  Surgeon: Jam Gavin MD;  [facility];  Service: Cardiovascular;  Laterality: N/A;    CARDIAC CATHETERIZATION N/A 8/24/2022    Procedure: Coronary angiography;  Surgeon: Jam Gavin MD;  Location: [facility] CATH INVASIVE LOCATION;  Service: Cardiovascular;  Laterality: N/A;    CARDIAC CATHETERIZATION N/A 8/24/2022    Procedure: Percutaneous Coronary Intervention;  Surgeon: Jam Gavin MD;  Location: [facility] CATH INVASIVE LOCATION;  Service: Cardiovascular;  Laterality: N/A;    CATARACT EXTRACTION, BILATERAL Bilateral 1995    CORONARY ARTERY BYPASS GRAFT  1994    3  vessel    EXTRACORPOREAL SHOCK WAVE LITHOTRIPSY (ESWL) Bilateral 2004    TONSILLECTOMY           FAMILY HISTORY  Family History   Problem Relation Age of Onset    Diabetes Father     Heart disease Father     Hypertension Father     Hyperlipidemia Father     Hypertension Sister     Hyperlipidemia Sister     Heart disease Sister         CABG    Diabetes Brother     Heart disease Brother         CABG    Hypertension Brother     Hyperlipidemia Brother     Prostate cancer Neg Hx          SOCIAL HISTORY  Social History     Socioeconomic History    Marital status:    Tobacco Use    Smoking status: Former     Packs/day: 0.50     Years: 12.00     Pack years: 6.00     Types: Cigarettes     Quit date:      Years since quittin.6    Smokeless tobacco: Never   Substance and Sexual Activity    Alcohol use: Never    Drug use: Never    Sexual activity: Yes     Partners: Female         ALLERGIES  Sulfa antibiotics and Trimethoprim        REVIEW OF SYSTEMS  Review of Systems     All systems reviewed and negative except for those discussed in HPI.       PHYSICAL EXAM    I have reviewed the triage vital signs and nursing notes.    ED Triage Vitals   Temp Heart Rate Resp BP SpO2   23 1651 23 1651 23 1651 23 1718 23 1651   96.8 øF (36 øC) 103 16 147/65 97 %      Temp src Heart Rate Source Patient Position BP Location FiO2 (%)   23 1651 23 1651 -- 23 1718 --   Tympanic Monitor  Right arm        Physical Exam    Physical Exam   Constitutional: No distress.  Nontoxic  HENT:  Head: Normocephalic and atraumatic.   Oropharynx: Mucous membranes are moist.   Eyes: No scleral icterus. No conjunctival pallor.  Neck: Painless range of motion noted. Neck supple.   Cardiovascular: Normal rate, regular rhythm and intact distal pulses.  Pulmonary/Chest: No respiratory distress.  No tachypnea or increased work of breathing   abdominal: Soft. There is no tenderness. There is no rebound and no  guarding.   Musculoskeletal: Moves all extremities equally.   Neurological: Alert.  Speech fluent and easily intelligible  Skin: Skin is pink, warm, and dry. No pallor.   Psychiatric: Mood and affect normal.   Nursing note and vitals reviewed.    LAB RESULTS  Recent Results (from the past 24 hour(s))   Green Top (Gel)    Collection Time: 08/23/23  5:33 PM   Result Value Ref Range    Extra Tube Hold for add-ons.    Lavender Top    Collection Time: 08/23/23  5:33 PM   Result Value Ref Range    Extra Tube hold for add-on    Gold Top - SST    Collection Time: 08/23/23  5:33 PM   Result Value Ref Range    Extra Tube Hold for add-ons.    Light Blue Top    Collection Time: 08/23/23  5:33 PM   Result Value Ref Range    Extra Tube Hold for add-ons.    Comprehensive Metabolic Panel    Collection Time: 08/23/23  5:33 PM    Specimen: Blood   Result Value Ref Range    Glucose 161 (H) 65 - 99 mg/dL    BUN 13 8 - 23 mg/dL    Creatinine 0.94 0.76 - 1.27 mg/dL    Sodium 140 136 - 145 mmol/L    Potassium 4.4 3.5 - 5.2 mmol/L    Chloride 103 98 - 107 mmol/L    CO2 24.0 22.0 - 29.0 mmol/L    Calcium 10.2 8.6 - 10.5 mg/dL    Total Protein 7.2 6.0 - 8.5 g/dL    Albumin 4.5 3.5 - 5.2 g/dL    ALT (SGPT) 17 1 - 41 U/L    AST (SGOT) 16 1 - 40 U/L    Alkaline Phosphatase 64 39 - 117 U/L    Total Bilirubin 0.3 0.0 - 1.2 mg/dL    Globulin 2.7 gm/dL    A/G Ratio 1.7 g/dL    BUN/Creatinine Ratio 13.8 7.0 - 25.0    Anion Gap 13.0 5.0 - 15.0 mmol/L    eGFR 82.5 >60.0 mL/min/1.73   CK    Collection Time: 08/23/23  5:33 PM    Specimen: Blood   Result Value Ref Range    Creatine Kinase 51 20 - 200 U/L   CBC Auto Differential    Collection Time: 08/23/23  5:33 PM    Specimen: Blood   Result Value Ref Range    WBC 6.36 3.40 - 10.80 10*3/mm3    RBC 4.84 4.14 - 5.80 10*6/mm3    Hemoglobin 13.3 13.0 - 17.7 g/dL    Hematocrit 40.6 37.5 - 51.0 %    MCV 83.9 79.0 - 97.0 fL    MCH 27.5 26.6 - 33.0 pg    MCHC 32.8 31.5 - 35.7 g/dL    RDW 13.1 12.3 - 15.4 %     RDW-SD 39.8 37.0 - 54.0 fl    MPV 10.8 6.0 - 12.0 fL    Platelets 167 140 - 450 10*3/mm3    Neutrophil % 62.3 42.7 - 76.0 %    Lymphocyte % 29.6 19.6 - 45.3 %    Monocyte % 5.8 5.0 - 12.0 %    Eosinophil % 1.3 0.3 - 6.2 %    Basophil % 0.8 0.0 - 1.5 %    Immature Grans % 0.2 0.0 - 0.5 %    Neutrophils, Absolute 3.97 1.70 - 7.00 10*3/mm3    Lymphocytes, Absolute 1.88 0.70 - 3.10 10*3/mm3    Monocytes, Absolute 0.37 0.10 - 0.90 10*3/mm3    Eosinophils, Absolute 0.08 0.00 - 0.40 10*3/mm3    Basophils, Absolute 0.05 0.00 - 0.20 10*3/mm3    Immature Grans, Absolute 0.01 0.00 - 0.05 10*3/mm3    nRBC 0.0 0.0 - 0.2 /100 WBC   Urinalysis With Microscopic If Indicated (No Culture) - Urine, Clean Catch    Collection Time: 08/23/23  5:54 PM    Specimen: Urine, Clean Catch   Result Value Ref Range    Color, UA Yellow Yellow, Straw    Appearance, UA Clear Clear    pH, UA 6.0 5.0 - 8.0    Specific Gravity, UA 1.006 1.005 - 1.030    Glucose, UA Negative Negative    Ketones, UA Negative Negative    Bilirubin, UA Negative Negative    Blood, UA Large (3+) (A) Negative    Protein, UA 30 mg/dL (1+) (A) Negative    Leuk Esterase, UA Negative Negative    Nitrite, UA Negative Negative    Urobilinogen, UA 0.2 E.U./dL 0.2 - 1.0 E.U./dL   Urinalysis With Culture If Indicated - Urine, Clean Catch    Collection Time: 08/23/23  5:54 PM    Specimen: Urine, Clean Catch   Result Value Ref Range    Color, UA Yellow Yellow, Straw    Appearance, UA Clear Clear    pH, UA 6.0 5.0 - 8.0    Specific Gravity, UA 1.006 1.005 - 1.030    Glucose, UA Negative Negative    Ketones, UA Negative Negative    Bilirubin, UA Negative Negative    Blood, UA Large (3+) (A) Negative    Protein, UA 30 mg/dL (1+) (A) Negative    Leuk Esterase, UA Negative Negative    Nitrite, UA Negative Negative    Urobilinogen, UA 0.2 E.U./dL 0.2 - 1.0 E.U./dL   Urinalysis, Microscopic Only - Urine, Clean Catch    Collection Time: 08/23/23  5:54 PM    Specimen: Urine, Clean Catch   Result  Value Ref Range    RBC, UA Too Numerous to Count (A) None Seen, 0-2 /HPF    WBC, UA 0-2 None Seen, 0-2 /HPF    Bacteria, UA None Seen None Seen /HPF    Squamous Epithelial Cells, UA 0-2 None Seen, 0-2 /HPF    Hyaline Casts, UA 0-2 None Seen /LPF    Methodology Automated Microscopy        Ordered the above labs and independently reviewed the results.        RADIOLOGY  CT Abdomen Pelvis Stone Protocol    Result Date: 8/23/2023  CT ABDOMEN AND PELVIS WITHOUT IV CONTRAST  HISTORY: 78-year-old male with asymptomatic hematuria.  TECHNIQUE: Radiation dose reduction techniques were utilized, including automated exposure control and exposure modulation based on body size. 3 mm images were obtained through the abdomen and pelvis without the administration of IV contrast. No priors for comparison.  FINDINGS: 1. There is an 8 mm stone within a mildly dilated left renal pelvis and the wall of the renal pelvis is slightly thickened. Intermittent obstruction at the UPJ is suspected. There is a 3 mm nonobstructing stone at the lower pole of the left kidney. 2. There are no right renal stones. There is a 6 cm cyst at the upper pole of the right kidney. 3. The prostate is enlarged measuring 5.5 cm in transverse diameter. Urinary bladder is mildly distended and has a mildly diffusely thickened wall. Please correlate clinically for acute cystitis. 4. Noncontrasted liver, gallbladder, spleen, pancreas, and adrenals appear unremarkable. No acute bowel abnormality is seen. Appendix appears within normal limits. 5. There are moderately extensive abdominal aortic atherosclerotic changes without aneurysmal dilatation.       I ordered the above noted radiological studies. Reviewed by me and discussed with radiologist.  See dictation for official radiology interpretation.      PROCEDURES    Procedures        MEDICATIONS GIVEN IN ER    Medications   sodium chloride 0.9 % flush 10 mL (has no administration in time range)         PROGRESS, DATA  ANALYSIS, CONSULTS, AND MEDICAL DECISION MAKING    Differential diagnosis for acute painless hematuria includes but is not limited to: ureterolithiasis, nephrolithiasis, rhabdomyolysis, bladder cancer.    All labs have been independently reviewed by me.  All radiology studies have been reviewed by me and discussed with radiologist dictating the report.   EKG's independently viewed and interpreted by me.  Discussion below represents my analysis of pertinent findings related to patient's condition, differential diagnosis, treatment plan and final disposition.      ED Course as of 08/23/23 2149   Wed Aug 23, 2023   1932 Blood, UA(!): Large (3+) [RS]   1932 Leukocytes, UA: Negative [RS]   1932 Nitrite, UA: Negative [RS]   1932 RBC, UA(!): Too Numerous to Count [RS]   1932 WBC, UA: 0-2 [RS]   1932 Bacteria, UA: None Seen [RS]   2139 Creatine Kinase: 51 [RS]   2139 Glucose(!): 161 [RS]   2139 BUN: 13 [RS]   2139 Creatinine: 0.94 [RS]   2139 Sodium: 140 [RS]   2139 Potassium: 4.4 [RS]   2139 Hemoglobin: 13.3 [RS]   2140 Patient and spouse updated with findings.  He is reassured about the laboratory findings.  He does have a history of kidney stones and is aware of what 1 moving will feel like.  He agrees with plan for discharge at this time and outpatient follow-up.  No medications felt to be of benefit to the patient at this time as he is not having pain and the stone is large and very proximal thus Flomax of limited utility.  Patient agreeable with plan to follow-up with urology. [RS]      ED Course User Index  [RS] García Salinas MD       AS OF 21:49 EDT VITALS:    BP - 124/68  HR - 62  TEMP - 96.8 øF (36 øC) (Tympanic)  O2 SATS - 96%        DIAGNOSIS  Final diagnoses:   Hematuria, unspecified type   Nephrolithiasis         DISPOSITION  DISCHARGE    Patient discharged in stable condition.    Reviewed implications of results, diagnosis, meds, responsibility to follow up, warning signs and symptoms of possible worsening,  potential complications and reasons to return to ER.    Patient/Family voiced understanding of above instructions.    Discussed plan for discharge, as there is no emergent indication for admission. Patient referred to primary care provider for regular health maintenance. Pt/family is agreeable and understands need for follow up and possible repeat testing.  Pt is aware that discharge does not mean that nothing is wrong but it indicates no emergency is present that requires admission and they must continue care with follow-up as given below or physician of their choice.     FOLLOW-UP  Mayito Sepulveda MD  [address] [address]  [phone]    Schedule an appointment as soon as possible for a visit   As needed    [facility]  [address] [place] [address]  [phone]  Call in 1 day  Schedule close outpatient follow-up         Medication List      No changes were made to your prescriptions during this visit.              García Salinas MD  08/23/23 1948

## 2023-09-07 ENCOUNTER — APPOINTMENT (OUTPATIENT)
Dept: PHYSICAL THERAPY | Facility: HOSPITAL | Age: 78
End: 2023-09-07
Payer: MEDICARE

## 2023-09-13 ENCOUNTER — APPOINTMENT (OUTPATIENT)
Dept: PHYSICAL THERAPY | Facility: HOSPITAL | Age: 78
End: 2023-09-13
Payer: MEDICARE

## 2023-09-15 ENCOUNTER — HOSPITAL ENCOUNTER (OUTPATIENT)
Dept: PHYSICAL THERAPY | Facility: HOSPITAL | Age: 78
Discharge: HOME | End: 2023-09-15
Payer: MEDICARE

## 2023-09-15 ENCOUNTER — APPOINTMENT (OUTPATIENT)
Dept: PHYSICAL THERAPY | Facility: HOSPITAL | Age: 78
End: 2023-09-15
Payer: MEDICARE

## 2023-09-15 DIAGNOSIS — S83.521D SPRAIN OF POSTERIOR CRUCIATE LIGAMENT OF RIGHT KNEE, SUBSEQUENT ENCOUNTER: ICD-10-CM

## 2023-09-15 DIAGNOSIS — M25.561 ACUTE PAIN OF RIGHT KNEE: Primary | ICD-10-CM

## 2023-09-15 DIAGNOSIS — S82.101D CLOSED FRACTURE OF PROXIMAL END OF RIGHT TIBIA WITH ROUTINE HEALING, UNSPECIFIED FRACTURE MORPHOLOGY, SUBSEQUENT ENCOUNTER: ICD-10-CM

## 2023-09-15 PROCEDURE — 97110 THERAPEUTIC EXERCISES: CPT

## 2023-09-15 NOTE — THERAPY PROGRESS REPORT/RE-CERT
Outpatient Physical Therapy Ortho Progress Note  [facility]     Patient Name: Kerrie Tyler  : 1945  MRN: [id number]  Today's Date: 9/15/2023      Visit Date: 09/15/2023    Visit Dx:    ICD-10-CM ICD-9-CM   1. Acute pain of right knee  M25.561 719.46   2. Closed fracture of proximal end of right tibia with routine healing, unspecified fracture morphology, subsequent encounter  S82.101D V54.16   3. 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    S/P CABG x 3    Benign prostatic hyperplasia with urinary hesitancy    Vitamin D deficiency    Presbycusis of both ears    Diabetic retinopathy associated with type 2 diabetes mellitus    Acute chest pain    Status post insertion of drug eluting coronary artery stent    Chest pain        Past Medical History:   Diagnosis Date    Kidney stone     Myocardial infarction         Past Surgical History:   Procedure Laterality Date    CARDIAC CATHETERIZATION N/A 2022    Procedure: Left Heart Cath  FEMORAL ACCESS;  Surgeon: Jam Gavin MD;  Location: [facility] CATH INVASIVE LOCATION;  Service: Cardiovascular;  Laterality: N/A;  Use femoral access- patient has bilateral CAMERON's with previous CABG    CARDIAC CATHETERIZATION N/A 2022    Procedure: Left ventriculography;  Surgeon: Jam Gavin MD;  Location: [facility] CATH INVASIVE LOCATION;  Service: Cardiovascular;  Laterality: N/A;    CARDIAC CATHETERIZATION N/A 2022    Procedure: Native mammary injection;  Surgeon: Jam Gavin MD;  Location: [facility] CATH INVASIVE LOCATION;  Service: Cardiovascular;  Laterality: N/A;    CARDIAC CATHETERIZATION  2022    Procedure: RESTING  FULL CYCLE RATIO;  Surgeon: Jam Gavin MD;  Location:  [facility] CATH INVASIVE LOCATION;  Service: Cardiovascular;;    CARDIAC CATHETERIZATION N/A 8/24/2022    Procedure: Stent LIZBET bypass graft;  Surgeon: Jam Gavin MD;  Location:  [facility] CATH INVASIVE LOCATION;  Service: Cardiovascular;  Laterality: N/A;    CARDIAC CATHETERIZATION N/A 8/24/2022    Procedure: Coronary angiography;  Surgeon: Jam Gavin MD;  Location:  [facility] CATH INVASIVE LOCATION;  Service: Cardiovascular;  Laterality: N/A;    CARDIAC CATHETERIZATION N/A 8/24/2022    Procedure: Percutaneous Coronary Intervention;  Surgeon: Jam Gavin MD;  Location:  [facility] CATH INVASIVE LOCATION;  Service: Cardiovascular;  Laterality: N/A;    CATARACT EXTRACTION, BILATERAL Bilateral 1995    CORONARY ARTERY BYPASS GRAFT  1994    3 vessel    EXTRACORPOREAL SHOCK WAVE LITHOTRIPSY (ESWL) Bilateral 2004    TONSILLECTOMY          PT Ortho       Row Name 09/15/23 1100       Right Lower Ext    Rt Knee Extension/Flexion AROM 0-122  -              User Key  (r) = Recorded By, (t) = Taken By, (c) = Cosigned By      Initials Name Provider Type     Honey Morrissey, PT Physical Therapist                                 PT Assessment/Plan       Row Name 09/15/23 1100          PT Assessment    Functional Limitations Limitation in home management;Limitations in community activities;Impaired gait;Performance in leisure activities  -     Impairments Gait;Impaired flexibility;Impaired muscle endurance;Impaired muscle length;Impaired muscle power;Joint mobility;Muscle strength;Pain;Poor body mechanics;Posture;Range of motion;Balance;Joint integrity  -     Assessment Comments Kerrie Tyler has been seen for 2 physical therapy sessions for R knee pain. X-rays performed in ED and negative for acute fracture but MD reviewed on 7/7/23 and reports posterior tibial plateau fracture laterally as well as MRI indicating avulsion fracture of posterior tibial intercondylar  eminence at the distal PCL and sprain of PCL and all other ligaments intact.  He was recently seen in ED 8/23/2023 for hematuria and has not been seen in clinic since 8/21/2023 with several canceled appointments due to kidney stone which further limited his ability to complete HEP. Treatment has included therapeutic exercise and patient education with home exercise program . Progress to physical therapy goals is fair as pt. Has met 2/3 STGs, and 2/5 LTGs. Despite limited visits he reports improved 90% improvement in R knee pain and 50% improvement in R hip pain. He demonstrates improved functional strength with increase in reps on 30sec STS from 4 at evaluation to 10 this date without use of UE to rise from chair as well as improved R knee AROM to 0-122. He continues with pain in groin with hip flexion which is his primary complaint particularly when trying to get in/out of car. He will benefit from continued skilled physical therapy to address remaining impairments and functional limitations.  -     Please refer to paper survey for additional self-reported information No  -     Rehab Potential Good  -     Patient/caregiver participated in establishment of treatment plan and goals Yes  -     Patient would benefit from skilled therapy intervention Yes  -        PT Plan    PT Frequency 2x/week  -     Predicted Duration of Therapy Intervention (PT) 8 visits  -     Planned CPT's? PT RE-EVAL: 25357;PT THER PROC EA 15 MIN: 35529;PT THER ACT EA 15 MIN: 20660;PT MANUAL THERAPY EA 15 MIN: 21311;PT NEUROMUSC RE-EDUCATION EA 15 MIN: 82001;PT GAIT TRAINING EA 15 MIN: 95330;PT SELF CARE/HOME MGMT/TRAIN EA 15: 87484;PT ELECTRICAL STIM UNATTEND: ;PT HOT OR COLD PACK TREAT MCARE;PT ELECTRICAL STIM ATTD EA 15 MIN: 75475  -     PT Plan Comments likely D/C QS, SLR; add side stepping, squats?  -               User Key  (r) = Recorded By, (t) = Taken By, (c) = Cosigned By      Initials Name Provider Type      Honey Morrissey, PT Physical Therapist                       OP Exercises       Row Name 09/15/23 1100             Subjective    Subjective Comments It's better, I had a kidney stone so I couldnt do my exercises  -         Total Minutes    25331 - PT Therapeutic Exercise Minutes 40  -MH         Exercise 1    Exercise Name 1 nustep  -MH      Cueing 1 Verbal;Demo  -MH      Time 1 5 mins  -MH         Exercise 2    Exercise Name 2 supine QS  -MH      Cueing 2 Verbal;Tactile  -MH      Sets 2 1  -MH      Reps 2 15  -MH      Time 2 5s  -MH         Exercise 3    Exercise Name 3 glute bridge  -MH      Cueing 3 Verbal  -MH      Reps 3 15  -MH      Time 3 5s  -MH      Additional Comments cues for slow up/down  -MH         Exercise 6    Exercise Name 6 QS + SLR  -MH      Cueing 6 Verbal  -MH      Sets 6 2 BL  -MH      Reps 6 15  -MH         Exercise 7    Exercise Name 7 Clamshell  -      Cueing 7 Verbal;Tactile;Demo  -MH      Sets 7 2  -MH      Reps 7 10  -MH         Exercise 9    Exercise Name 9 STS  -MH      Cueing 9 Verbal  -MH      Sets 9 1  -MH      Reps 9 10  -MH         Exercise 11    Exercise Name 11 Supine figure 4 IR stretch and piriformis stretch  -MH      Cueing 11 Verbal;Tactile;Demo  -MH      Reps 11 3B  -MH      Time 11 20s  -MH         Exercise 12    Exercise Name 12 goals update  -                User Key  (r) = Recorded By, (t) = Taken By, (c) = Cosigned By      Initials Name Provider Type     Honey Morrissey, PT Physical Therapist                                  PT OP Goals       Row Name 09/15/23 1100          PT Short Term Goals    STG Date to Achieve 09/16/23  -     STG 1 Pt will be independent with initial HEP to improve strength/ROM and decrease pain.  -     STG 1 Progress Ongoing  -     STG 1 Progress Comments unable to complete due to kidney stone  -     STG 2 Patient will report 25-50% reduction in R hip/knee pain when performing hip flexion based activities within his ADLs to  indicate improved activity tolerance and return to PLOF.  -     STG 2 Progress Met  -     STG 2 Progress Comments knee is better 90%, hip 50% pain along sartorius per patient with flexion  -     STG 3 Pt performs 30 seconds sit to stand having complete at least 2 more repetitions that was performed upon initial evaluation for progression of ease with functional transfers, LE strength, and safety in the home.  -     STG 3 Progress Met  -     STG 3 Progress Comments 10  -        Long Term Goals    LTG Date to Achieve 10/16/23  -     LTG 1 Pt will be independent with advance HEP to improve strength/ROM and decrease pain.  -     LTG 1 Progress Ongoing  -     LTG 2 Pt will improve R knee AROM from lacking 3-115 degrees to 0-120 degrees to improve ability to navigate stairs.  -     LTG 2 Progress Met  [facility]     LTG 2 Progress Comments see ortho  -     LTG 3 Pt will improve KOS score to 85% ability to show improved quality of life.  -     LTG 3 Progress Ongoing  -     LTG 4 Pt performs 30 seconds sit to stand having complete at least 3-4 more repetitions that was performed upon initial evaluation for progression of ease with functional transfers, LE strength, and safety in the home.  -     LTG 4 Progress Met  -     LTG 4 Progress Comments 10  -     LTG 5 Patient able to tandem stance bilaterally with 1 finger hold > 30 seconds without LOB for improved core stabilization and decreased fall risk  -     LTG 5 Progress Ongoing  [facility]               User Key  (r) = Recorded By, (t) = Taken By, (c) = Cosigned By      Initials Name Provider Type    Honey Gómez PT Physical Therapist                    Therapy Education  Given: Symptoms/condition management  Program: Reinforced  How Provided: Verbal  Provided to: Patient  Level of Understanding: Verbalized    30 Second Chair Stand Test  30 Second Chair Stand Test: 10 (clinic chair no UE)         Time Calculation:   Start Time: 1115  Stop Time:  1155  Time Calculation (min): 40 min  Total Timed Code Minutes- PT: 40 minute(s)  Timed Charges  91523 - PT Therapeutic Exercise Minutes: 40  Total Minutes  Timed Charges Total Minutes: 40   Total Minutes: 40  Therapy Charges for Today       Code Description Service Date Service Provider Modifiers Qty    [id number] HC PT THER PROC EA 15 MIN 9/15/2023 Honey Morrissey, PT GP 3                      Honey Morrissey, PT  9/15/2023

## 2023-09-18 DIAGNOSIS — I10 ESSENTIAL HYPERTENSION: ICD-10-CM

## 2023-09-19 DIAGNOSIS — E11.9 TYPE 2 DIABETES MELLITUS WITHOUT COMPLICATION, WITHOUT LONG-TERM CURRENT USE OF INSULIN: ICD-10-CM

## 2023-09-19 DIAGNOSIS — I10 ESSENTIAL HYPERTENSION: ICD-10-CM

## 2023-09-19 DIAGNOSIS — R07.9 CHEST PAIN, UNSPECIFIED TYPE: Primary | ICD-10-CM

## 2023-09-19 RX ORDER — TRANDOLAPRIL TABLETS 4 MG/1
TABLET ORAL
Qty: 60 TABLET | Refills: 0 | Status: SHIPPED | OUTPATIENT
Start: 2023-09-19

## 2023-09-19 RX ORDER — TRANDOLAPRIL TABLETS 4 MG/1
TABLET ORAL
Qty: 180 TABLET | OUTPATIENT
Start: 2023-09-19

## 2023-09-20 ENCOUNTER — APPOINTMENT (OUTPATIENT)
Dept: PHYSICAL THERAPY | Facility: HOSPITAL | Age: 78
End: 2023-09-20
Payer: MEDICARE

## 2023-09-20 RX ORDER — METFORMIN HYDROCHLORIDE 500 MG/1
500 TABLET, EXTENDED RELEASE ORAL 2 TIMES DAILY
Qty: 180 TABLET | Refills: 1 | Status: SHIPPED | OUTPATIENT
Start: 2023-09-20

## 2023-09-20 RX ORDER — NITROGLYCERIN 0.4 MG/1
0.4 TABLET SUBLINGUAL
Qty: 25 TABLET | Refills: 12 | Status: SHIPPED | OUTPATIENT
Start: 2023-09-20

## 2023-09-20 NOTE — TELEPHONE ENCOUNTER
Last office visit: 9/6/22    Next office visit: none scheduled    Last refill: nitro 6/1/22, metformin 4/24/23

## 2023-09-22 ENCOUNTER — APPOINTMENT (OUTPATIENT)
Dept: PHYSICAL THERAPY | Facility: HOSPITAL | Age: 78
End: 2023-09-22
Payer: MEDICARE

## 2023-09-22 DIAGNOSIS — I10 ESSENTIAL HYPERTENSION: ICD-10-CM

## 2023-09-22 RX ORDER — TRANDOLAPRIL TABLETS 4 MG/1
TABLET ORAL
Qty: 180 TABLET | OUTPATIENT
Start: 2023-09-22

## 2023-09-27 ENCOUNTER — HOSPITAL ENCOUNTER (OUTPATIENT)
Dept: PHYSICAL THERAPY | Facility: HOSPITAL | Age: 78
Setting detail: THERAPIES SERIES
Discharge: HOME | End: 2023-09-27
Payer: MEDICARE

## 2023-09-27 DIAGNOSIS — S82.101D CLOSED FRACTURE OF PROXIMAL END OF RIGHT TIBIA WITH ROUTINE HEALING, UNSPECIFIED FRACTURE MORPHOLOGY, SUBSEQUENT ENCOUNTER: ICD-10-CM

## 2023-09-27 DIAGNOSIS — S83.521D SPRAIN OF POSTERIOR CRUCIATE LIGAMENT OF RIGHT KNEE, SUBSEQUENT ENCOUNTER: ICD-10-CM

## 2023-09-27 DIAGNOSIS — M25.561 ACUTE PAIN OF RIGHT KNEE: Primary | ICD-10-CM

## 2023-09-27 PROCEDURE — 97110 THERAPEUTIC EXERCISES: CPT

## 2023-09-27 NOTE — THERAPY TREATMENT NOTE
Outpatient Physical Therapy Ortho Treatment Note  [facility]     Patient Name: Kerrie Tyler  : 1945  MRN: [id number]  Today's Date: 2023      Visit Date: 2023    Visit Dx:    ICD-10-CM ICD-9-CM   1. Acute pain of right knee  M25.561 719.46   2. Closed fracture of proximal end of right tibia with routine healing, unspecified fracture morphology, subsequent encounter  S82.101D V54.16   3. 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    S/P CABG x 3    Benign prostatic hyperplasia with urinary hesitancy    Vitamin D deficiency    Presbycusis of both ears    Diabetic retinopathy associated with type 2 diabetes mellitus    Acute chest pain    Status post insertion of drug eluting coronary artery stent    Chest pain        Past Medical History:   Diagnosis Date    Kidney stone     Myocardial infarction         Past Surgical History:   Procedure Laterality Date    CARDIAC CATHETERIZATION N/A 2022    Procedure: Left Heart Cath  FEMORAL ACCESS;  Surgeon: Jam Gavin MD;  Location: [facility] CATH INVASIVE LOCATION;  Service: Cardiovascular;  Laterality: N/A;  Use femoral access- patient has bilateral CAMERON's with previous CABG    CARDIAC CATHETERIZATION N/A 2022    Procedure: Left ventriculography;  Surgeon: Jam Gavin MD;  Location: [facility] CATH INVASIVE LOCATION;  Service: Cardiovascular;  Laterality: N/A;    CARDIAC CATHETERIZATION N/A 2022    Procedure: Native mammary injection;  Surgeon: Jam Gavin MD;  Location: [facility] CATH INVASIVE LOCATION;  Service: Cardiovascular;  Laterality: N/A;    CARDIAC CATHETERIZATION  2022    Procedure: RESTING  FULL CYCLE RATIO;  Surgeon: Jam Gavin MD;  Location:  [facility] CATH INVASIVE LOCATION;  Service: Cardiovascular;;    CARDIAC CATHETERIZATION N/A 8/24/2022    Procedure: Stent LIZBET bypass graft;  Surgeon: Jam Gavin MD;  Location:  [facility] CATH INVASIVE LOCATION;  Service: Cardiovascular;  Laterality: N/A;    CARDIAC CATHETERIZATION N/A 8/24/2022    Procedure: Coronary angiography;  Surgeon: Jam Gavin MD;  Location:  [facility] CATH INVASIVE LOCATION;  Service: Cardiovascular;  Laterality: N/A;    CARDIAC CATHETERIZATION N/A 8/24/2022    Procedure: Percutaneous Coronary Intervention;  Surgeon: Jam Gavin MD;  Location:  [facility] CATH INVASIVE LOCATION;  Service: Cardiovascular;  Laterality: N/A;    CATARACT EXTRACTION, BILATERAL Bilateral 1995    CORONARY ARTERY BYPASS GRAFT  1994    3 vessel    EXTRACORPOREAL SHOCK WAVE LITHOTRIPSY (ESWL) Bilateral 2004    TONSILLECTOMY                          PT Assessment/Plan       Row Name 09/27/23 1000          PT Assessment    Assessment Comments Mr. Tyler arrives to PT reporting he feels his L knee is more unstable compared to last session and expresses this feeling comes/goes from day to day. He also expresses R hip has improved some since the start of PT but he primarily has pain when performing him flexion based movements. Reviewed various stretches at start of session before transitioning to glute/hip strengthening exercises. Added resistance band to SL clam shells and also added lateral stepping, monster walks and mini squats with good tolerance. HEP not updated to determine patient response. Mr. Tyler remains a candidate for skilled PT.  -WILMAR        PT Plan    PT Plan Comments response to last session, update HEP if appropriate, consider step ups, hip ext, SLS  -WILMAR               User Key  (r) = Recorded By, (t) = Taken By, (c) = Cosigned By      Initials Name Provider Type    Isabelle Acevedo, PT Physical Therapist                       OP  Exercises       Row Name 09/27/23 1000             Subjective    Subjective Comments My knee feels unsteady and my hip is still bothering me  -WILMAR         Total Minutes    47445 - PT Therapeutic Exercise Minutes 38  -WILMAR         Exercise 1    Exercise Name 1 nustep  -WILMAR      Cueing 1 Verbal;Demo  -WILMAR      Time 1 5 mins  -WILMAR         Exercise 3    Exercise Name 3 glute bridge  -WILMAR      Cueing 3 Verbal  -WILMAR      Sets 3 2  -WILMAR      Reps 3 10  -WILMAR      Time 3 5s  -WILMAR         Exercise 4    Exercise Name 4 seated HS stretch  -WILMAR      Cueing 4 Verbal;Demo  -WILMAR      Reps 4 3  -WILMAR      Time 4 20s  -WILMAR         Exercise 5    Exercise Name 5 mini squats  -WILMAR      Cueing 5 Verbal;Demo  -WILMAR      Sets 5 2  -WILMAR      Reps 5 10  -WILMAR         Exercise 7    Exercise Name 7 Clamshell  -WILMAR      Cueing 7 Verbal;Tactile;Demo  -WILMAR      Sets 7 2  -WILMAR      Reps 7 10  -WILMAR      Time 7 RTB  -WILMAR         Exercise 9    Exercise Name 9 STS  -WILMAR      Cueing 9 Verbal  -WILMAR      Sets 9 2  -WILMAR      Reps 9 10  -WILMAR         Exercise 10    Exercise Name 10 Hip flexor stretch at stairs  -WILMAR      Cueing 10 Verbal;Demo  -WILMAR      Sets 10 2 BL  -WILMAR      Time 10 30s  -WILMAR         Exercise 11    Exercise Name 11 Supine figure 4 IR stretch and piriformis stretch  -WILMAR      Cueing 11 Verbal;Tactile;Demo  -WILMAR      Reps 11 3B  -WILMAR      Time 11 20s  -WILMAR         Exercise 12    Exercise Name 12 lateral stepping  -WILMAR      Cueing 12 Verbal;Demo  -WILMAR      Reps 12 3 laps  -WILMAR      Time 12 RTB  -WILMAR         Exercise 13    Exercise Name 13 monster walks  -WILMAR      Cueing 13 Verbal;Demo  -WILMAR      Reps 13 3 laps  -WILMAR      Time 13 RTB  -WILMAR                User Key  (r) = Recorded By, (t) = Taken By, (c) = Cosigned By      Initials Name Provider Type    Isabelle Acevedo, PT Physical Therapist                                  PT OP Goals       Row Name 09/27/23 0900          PT Short Term Goals    STG Date to Achieve 09/16/23  -WILMAR     STG 1 Pt will be independent with initial HEP  to improve strength/ROM and decrease pain.  -     STG 1 Progress Ongoing  -WILMAR     STG 2 Patient will report 25-50% reduction in R hip/knee pain when performing hip flexion based activities within his ADLs to indicate improved activity tolerance and return to PLOF.  -     STG 2 Progress Met  -WILMAR     STG 3 Pt performs 30 seconds sit to stand having complete at least 2 more repetitions that was performed upon initial evaluation for progression of ease with functional transfers, LE strength, and safety in the home.  -     STG 3 Progress Met  -WILMAR        Long Term Goals    LTG Date to Achieve 10/16/23  -WILMAR     LTG 1 Pt will be independent with advance HEP to improve strength/ROM and decrease pain.  -WILMAR     LTG 1 Progress Ongoing  -WILMAR     LTG 2 Pt will improve R knee AROM from lacking 3-115 degrees to 0-120 degrees to improve ability to navigate stairs.  -     LTG 2 Progress Met  -WILMAR     LTG 3 Pt will improve KOS score to 85% ability to show improved quality of life.  -     LTG 3 Progress Ongoing  -WILMAR     LTG 4 Pt performs 30 seconds sit to stand having complete at least 3-4 more repetitions that was performed upon initial evaluation for progression of ease with functional transfers, LE strength, and safety in the home.  -     LTG 4 Progress Met  -     LTG 5 Patient able to tandem stance bilaterally with 1 finger hold > 30 seconds without LOB for improved core stabilization and decreased fall risk  -     LTG 5 Progress Ongoing  -               User Key  (r) = Recorded By, (t) = Taken By, (c) = Cosigned By      Initials Name Provider Type    Isabelle Acevedo PT Physical Therapist                                   Time Calculation:   Start Time: 1003  Stop Time: 1041  Time Calculation (min): 38 min  Timed Charges  58304 - PT Therapeutic Exercise Minutes: 38  Total Minutes  Timed Charges Total Minutes: 38   Total Minutes: 38  Therapy Charges for Today       Code Description Service Date Service  Provider Modifiers Qty    [id number]  PT THER PROC EA 15 MIN 9/27/2023 Isabelle Belle, PT GP 3                      Isabelle Belle, PT  9/27/2023

## 2023-09-29 ENCOUNTER — HOSPITAL ENCOUNTER (OUTPATIENT)
Dept: PHYSICAL THERAPY | Facility: HOSPITAL | Age: 78
Setting detail: THERAPIES SERIES
Discharge: HOME | End: 2023-09-29
Payer: MEDICARE

## 2023-09-29 DIAGNOSIS — M25.561 ACUTE PAIN OF RIGHT KNEE: Primary | ICD-10-CM

## 2023-09-29 DIAGNOSIS — S83.521D SPRAIN OF POSTERIOR CRUCIATE LIGAMENT OF RIGHT KNEE, SUBSEQUENT ENCOUNTER: ICD-10-CM

## 2023-09-29 DIAGNOSIS — S82.101D CLOSED FRACTURE OF PROXIMAL END OF RIGHT TIBIA WITH ROUTINE HEALING, UNSPECIFIED FRACTURE MORPHOLOGY, SUBSEQUENT ENCOUNTER: ICD-10-CM

## 2023-09-29 PROCEDURE — 97110 THERAPEUTIC EXERCISES: CPT

## 2023-09-29 NOTE — THERAPY TREATMENT NOTE
Outpatient Physical Therapy Ortho Treatment Note  [facility]     Patient Name: Kerrie Tyler  : 1945  MRN: [id number]  Today's Date: 2023      Visit Date: 2023    Visit Dx:    ICD-10-CM ICD-9-CM   1. Acute pain of right knee  M25.561 719.46   2. Closed fracture of proximal end of right tibia with routine healing, unspecified fracture morphology, subsequent encounter  S82.101D V54.16   3. 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    S/P CABG x 3    Benign prostatic hyperplasia with urinary hesitancy    Vitamin D deficiency    Presbycusis of both ears    Diabetic retinopathy associated with type 2 diabetes mellitus    Acute chest pain    Status post insertion of drug eluting coronary artery stent    Chest pain        Past Medical History:   Diagnosis Date    Kidney stone     Myocardial infarction         Past Surgical History:   Procedure Laterality Date    CARDIAC CATHETERIZATION N/A 2022    Procedure: Left Heart Cath  FEMORAL ACCESS;  Surgeon: Jam Gavin MD;  Location: [facility] CATH INVASIVE LOCATION;  Service: Cardiovascular;  Laterality: N/A;  Use femoral access- patient has bilateral CAMERON's with previous CABG    CARDIAC CATHETERIZATION N/A 2022    Procedure: Left ventriculography;  Surgeon: Jam Gavin MD;  Location: [facility] CATH INVASIVE LOCATION;  Service: Cardiovascular;  Laterality: N/A;    CARDIAC CATHETERIZATION N/A 2022    Procedure: Native mammary injection;  Surgeon: Jam Gavin MD;  Location: [facility] CATH INVASIVE LOCATION;  Service: Cardiovascular;  Laterality: N/A;    CARDIAC CATHETERIZATION  2022    Procedure: RESTING  FULL CYCLE RATIO;  Surgeon: Jam Gavin MD;  Location:  [facility] CATH INVASIVE LOCATION;  Service: Cardiovascular;;    CARDIAC CATHETERIZATION N/A 8/24/2022    Procedure: Stent LIZBET bypass graft;  Surgeon: Jam Gavin MD;  Location:  [facility] CATH INVASIVE LOCATION;  Service: Cardiovascular;  Laterality: N/A;    CARDIAC CATHETERIZATION N/A 8/24/2022    Procedure: Coronary angiography;  Surgeon: Jam Gavin MD;  Location:  [facility] CATH INVASIVE LOCATION;  Service: Cardiovascular;  Laterality: N/A;    CARDIAC CATHETERIZATION N/A 8/24/2022    Procedure: Percutaneous Coronary Intervention;  Surgeon: Jam Gavin MD;  Location:  [facility] CATH INVASIVE LOCATION;  Service: Cardiovascular;  Laterality: N/A;    CATARACT EXTRACTION, BILATERAL Bilateral 1995    CORONARY ARTERY BYPASS GRAFT  1994    3 vessel    EXTRACORPOREAL SHOCK WAVE LITHOTRIPSY (ESWL) Bilateral 2004    TONSILLECTOMY                          PT Assessment/Plan       Row Name 09/29/23 1000          PT Assessment    Assessment Comments 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 Pain in both R knee and hip. Noted increased challenge with maintaining neutral LE alignment with side stepping when moving to R>L. Benefits from cueing and demonstration at beginning of exercise to improve body awareness and form. Pt. remains good candidate for skilled PT.  -        PT Plan    PT Plan Comments update HEP if better tolerance - D/C after remaining visits?  -               User Key  (r) = Recorded By, (t) = Taken By, (c) = Cosigned By      Initials Name Provider Type     Honey Morrissey, PT Physical Therapist                       OP Exercises       Row Name 09/29/23 1000             Subjective    Subjective Comments May have had a wrong movement yesterday, still hurt hehre and here (knee and hip)  -         Subjective Pain    Able to rate subjective pain? yes  -      Pre-Treatment Pain Level 6   -MH         Total Minutes    66405 - PT Therapeutic Exercise Minutes 38  -MH         Exercise 1    Exercise Name 1 nustep  -MH      Cueing 1 Verbal;Demo  -MH      Time 1 5 mins  -MH         Exercise 3    Exercise Name 3 glute bridge  -MH      Cueing 3 Verbal  -MH      Sets 3 2  -MH      Reps 3 15  -MH      Time 3 5s  -MH      Additional Comments RTB  -MH         Exercise 4    Exercise Name 4 seated HS stretch  -MH      Cueing 4 Verbal;Demo  -MH      Reps 4 3b  -MH      Time 4 20s  -MH         Exercise 6    Exercise Name 6 QS + SLR  -MH      Cueing 6 Verbal  -MH      Sets 6 2 BL  -MH      Reps 6 15  -MH         Exercise 7    Exercise Name 7 Clamshell  -MH      Cueing 7 Verbal;Tactile;Demo  -MH      Sets 7 2  -MH      Reps 7 15  -MH      Time 7 RTB  -MH         Exercise 8    Exercise Name 8 '  -MH         Exercise 10    Exercise Name 10 Hip flexor stretch at stairs  -MH      Cueing 10 Verbal;Demo  -MH      Reps 10 2BL  -MH      Time 10 30s  -MH         Exercise 11    Exercise Name 11 Supine figure 4 IR stretch and piriformis stretch  -MH      Cueing 11 Verbal;Tactile;Demo  -MH      Reps 11 3B  -MH      Time 11 20s  -MH         Exercise 12    Exercise Name 12 lateral stepping  -MH      Cueing 12 Verbal;Demo  -MH      Reps 12 3 laps  -MH      Time 12 RTB  -MH      Additional Comments frequent cueing, particularly when moving to R to manitain neutral LE alignment  -MH         Exercise 13    Exercise Name 13 monster walks  -MH      Cueing 13 Verbal;Demo  -MH      Reps 13 3 laps  -MH      Time 13 RTB  -MH         Exercise 14    Exercise Name 14 SLS  -MH      Cueing 14 Verbal  -MH      Reps 14 2e  -MH      Time 14 30s  -MH                User Key  (r) = Recorded By, (t) = Taken By, (c) = Cosigned By      Initials Name Provider Type    Honey Gómez, PT Physical Therapist                                  PT OP Goals       Row Name 09/29/23 1000          PT Short Term Goals    STG Date to Achieve 09/16/23  -MH     STG 1  Pt will be independent with initial HEP to improve strength/ROM and decrease pain.  -     STG 1 Progress Ongoing  -     STG 2 Patient will report 25-50% reduction in R hip/knee pain when performing hip flexion based activities within his ADLs to indicate improved activity tolerance and return to PLOF.  -     STG 2 Progress Met  [facility]     STG 3 Pt performs 30 seconds sit to stand having complete at least 2 more repetitions that was performed upon initial evaluation for progression of ease with functional transfers, LE strength, and safety in the home.  -     STG 3 Progress Met  [facility]        Long Term Goals    LTG Date to Achieve 10/16/23  -     LTG 1 Pt will be independent with advance HEP to improve strength/ROM and decrease pain.  -     LTG 1 Progress Ongoing  -     LTG 2 Pt will improve R knee AROM from lacking 3-115 degrees to 0-120 degrees to improve ability to navigate stairs.  -     LTG 2 Progress Met  [facility]     LTG 3 Pt will improve KOS score to 85% ability to show improved quality of life.  -     LTG 3 Progress Ongoing  -     LTG 4 Pt performs 30 seconds sit to stand having complete at least 3-4 more repetitions that was performed upon initial evaluation for progression of ease with functional transfers, LE strength, and safety in the home.  -     LTG 4 Progress Met  [facility]     LTG 5 Patient able to tandem stance bilaterally with 1 finger hold > 30 seconds without LOB for improved core stabilization and decreased fall risk  -     LTG 5 Progress [facility]               User Key  (r) = Recorded By, (t) = Taken By, (c) = Cosigned By      Initials Name Provider Type    Honey Gómez PT Physical Therapist                    Therapy Education  Given: Symptoms/condition management  Program: Reinforced  How Provided: Verbal  Provided to: Patient  Level of Understanding: Verbalized              Time Calculation:   Start Time: 1030  Stop Time: 1108  Time Calculation (min): 38 min  Total Timed  Code Minutes- PT: 38 minute(s)  Timed Charges  13024 - PT Therapeutic Exercise Minutes: 38  Total Minutes  Timed Charges Total Minutes: 38   Total Minutes: 38  Therapy Charges for Today       Code Description Service Date Service Provider Modifiers Qty    [id number] HC PT THER PROC EA 15 MIN 9/29/2023 Honey Morrissey, PT GP 3                      Honey Morrissey, PT  9/29/2023

## 2023-10-04 ENCOUNTER — HOSPITAL ENCOUNTER (OUTPATIENT)
Dept: PHYSICAL THERAPY | Facility: HOSPITAL | Age: 78
Setting detail: THERAPIES SERIES
Discharge: HOME | End: 2023-10-04
Payer: MEDICARE

## 2023-10-04 DIAGNOSIS — S83.521D SPRAIN OF POSTERIOR CRUCIATE LIGAMENT OF RIGHT KNEE, SUBSEQUENT ENCOUNTER: ICD-10-CM

## 2023-10-04 DIAGNOSIS — S82.101D CLOSED FRACTURE OF PROXIMAL END OF RIGHT TIBIA WITH ROUTINE HEALING, UNSPECIFIED FRACTURE MORPHOLOGY, SUBSEQUENT ENCOUNTER: ICD-10-CM

## 2023-10-04 DIAGNOSIS — M25.561 ACUTE PAIN OF RIGHT KNEE: Primary | ICD-10-CM

## 2023-10-04 PROCEDURE — 97110 THERAPEUTIC EXERCISES: CPT

## 2023-10-04 NOTE — THERAPY TREATMENT NOTE
Outpatient Physical Therapy Ortho Treatment Note  [facility]     Patient Name: Kerrie Tyler  : 1945  MRN: [id number]  Today's Date: 10/4/2023      Visit Date: 10/04/2023    Visit Dx:    ICD-10-CM ICD-9-CM   1. Acute pain of right knee  M25.561 719.46   2. Closed fracture of proximal end of right tibia with routine healing, unspecified fracture morphology, subsequent encounter  S82.101D V54.16   3. 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    S/P CABG x 3    Benign prostatic hyperplasia with urinary hesitancy    Vitamin D deficiency    Presbycusis of both ears    Diabetic retinopathy associated with type 2 diabetes mellitus    Acute chest pain    Status post insertion of drug eluting coronary artery stent    Chest pain        Past Medical History:   Diagnosis Date    Kidney stone     Myocardial infarction         Past Surgical History:   Procedure Laterality Date    CARDIAC CATHETERIZATION N/A 2022    Procedure: Left Heart Cath  FEMORAL ACCESS;  Surgeon: Jam Gavin MD;  Location: [facility] CATH INVASIVE LOCATION;  Service: Cardiovascular;  Laterality: N/A;  Use femoral access- patient has bilateral CAMERON's with previous CABG    CARDIAC CATHETERIZATION N/A 2022    Procedure: Left ventriculography;  Surgeon: Jam Gavin MD;  Location: [facility] CATH INVASIVE LOCATION;  Service: Cardiovascular;  Laterality: N/A;    CARDIAC CATHETERIZATION N/A 2022    Procedure: Native mammary injection;  Surgeon: Jam Gavin MD;  Location: [facility] CATH INVASIVE LOCATION;  Service: Cardiovascular;  Laterality: N/A;    CARDIAC CATHETERIZATION  2022    Procedure: RESTING  FULL CYCLE RATIO;  Surgeon: Jam Gavin MD;  Location:  [facility] CATH INVASIVE LOCATION;  Service: Cardiovascular;;    CARDIAC CATHETERIZATION N/A 8/24/2022    Procedure: Stent LIZBET bypass graft;  Surgeon: Jam Gavin MD;  Location:  [facility] CATH INVASIVE LOCATION;  Service: Cardiovascular;  Laterality: N/A;    CARDIAC CATHETERIZATION N/A 8/24/2022    Procedure: Coronary angiography;  Surgeon: Jam Gavin MD;  Location:  [facility] CATH INVASIVE LOCATION;  Service: Cardiovascular;  Laterality: N/A;    CARDIAC CATHETERIZATION N/A 8/24/2022    Procedure: Percutaneous Coronary Intervention;  Surgeon: Jam Gavin MD;  Location:  [facility] CATH INVASIVE LOCATION;  Service: Cardiovascular;  Laterality: N/A;    CATARACT EXTRACTION, BILATERAL Bilateral 1995    CORONARY ARTERY BYPASS GRAFT  1994    3 vessel    EXTRACORPOREAL SHOCK WAVE LITHOTRIPSY (ESWL) Bilateral 2004    TONSILLECTOMY                          PT Assessment/Plan       Row Name 10/04/23 1000          PT Assessment    Assessment Comments Mr. Tyler arrives to PT reporting his symptoms have overall improved with lingering R groin pain. He demonstrates improved independence with current exercise routine with intermittent cues needed. Patient without complaint of pain throughout session. Updated HEP, reviewed changes with patient and provided handout. Kerrie Tyler remains a candidate for skilled PT and would benefit from one additional session prior to transitioning to independent management.  -WILMAR        PT Plan    PT Plan Comments anticipate d/c and review finalized HEP  -WILMAR               User Key  (r) = Recorded By, (t) = Taken By, (c) = Cosigned By      Initials Name Provider Type    Isabelle Acevedo, PT Physical Therapist                       OP Exercises       Row Name 10/04/23 1000             Subjective    Subjective Comments I am feeling better  -WILMAR         Total Minutes    56071 - PT Therapeutic Exercise Minutes 40  -WILMAR         Exercise  1    Exercise Name 1 nustep  -WILMAR      Cueing 1 Verbal;Demo  -WILMAR      Time 1 5 mins  -WILMAR         Exercise 3    Exercise Name 3 glute bridge  -WILMAR      Cueing 3 Verbal  -WILMAR      Sets 3 2  -WILMAR      Reps 3 15  -WILMAR      Time 3 5s  -WILMAR      Additional Comments RTB  -WILMAR         Exercise 4    Exercise Name 4 seated HS stretch  -WILMAR      Cueing 4 Verbal;Demo  -WILMAR      Reps 4 3b  -WILMAR      Time 4 20s  -WILMAR         Exercise 6    Exercise Name 6 QS + SLR  -WILMAR      Cueing 6 Verbal  -WILMAR      Sets 6 2 BL  -WILMAR      Reps 6 15  -WILMAR         Exercise 7    Exercise Name 7 Clamshell  -WILMAR      Cueing 7 Verbal;Tactile;Demo  -WILMAR      Sets 7 2  -WILMAR      Reps 7 15  -WILMAR      Time 7 RTB  -WILMAR         Exercise 8    Exercise Name 8 '  -WILMAR         Exercise 9    Exercise Name 9 STS  -WILMAR      Cueing 9 Verbal  -WILMAR      Sets 9 2  -WILMAR      Reps 9 10  -WILMAR         Exercise 10    Exercise Name 10 Hip flexor stretch at stairs  -WILMAR      Cueing 10 Verbal;Demo  -WILMAR      Reps 10 2BL  -WILMAR      Time 10 30s  -WILMAR         Exercise 11    Exercise Name 11 Supine figure 4 IR stretch and piriformis stretch  -WILMAR      Cueing 11 Verbal;Tactile;Demo  -WILMAR      Reps 11 3B  -WILMAR      Time 11 20s  -WILMAR         Exercise 12    Exercise Name 12 lateral stepping  -WILMAR      Cueing 12 Verbal;Demo  -WILMAR      Reps 12 3 laps  -WILMAR      Time 12 RTB  -WILMAR         Exercise 13    Exercise Name 13 monster walks  -WILMAR      Cueing 13 Verbal;Demo  -WILMAR      Reps 13 3 laps  -WILMAR      Time 13 RTB  -WILMAR         Exercise 14    Exercise Name 14 SLS  -WILMAR      Cueing 14 Verbal  -WILMAR      Reps 14 2e  -WILMAR      Time 14 30s  -WILMAR                User Key  (r) = Recorded By, (t) = Taken By, (c) = Cosigned By      Initials Name Provider Type    Isabelle Acevedo, PT Physical Therapist                                  PT OP Goals       Row Name 10/04/23 1000          PT Short Term Goals    STG Date to Achieve 09/16/23  -WILMAR     STG 1 Pt will be independent with initial HEP to improve strength/ROM and decrease pain.   -     STG 1 Progress Ongoing  -WILMAR     STG 2 Patient will report 25-50% reduction in R hip/knee pain when performing hip flexion based activities within his ADLs to indicate improved activity tolerance and return to PLOF.  -     STG 2 Progress Met  -     STG 3 Pt performs 30 seconds sit to stand having complete at least 2 more repetitions that was performed upon initial evaluation for progression of ease with functional transfers, LE strength, and safety in the home.  -     STG 3 Progress Met  -        Long Term Goals    LTG Date to Achieve 10/16/23  -     LTG 1 Pt will be independent with advance HEP to improve strength/ROM and decrease pain.  -     LTG 1 Progress Ongoing  -WILMAR     LTG 2 Pt will improve R knee AROM from lacking 3-115 degrees to 0-120 degrees to improve ability to navigate stairs.  -     LTG 2 Progress Met  -     LTG 3 Pt will improve KOS score to 85% ability to show improved quality of life.  -     LTG 3 Progress Ongoing  -WILMAR     LTG 4 Pt performs 30 seconds sit to stand having complete at least 3-4 more repetitions that was performed upon initial evaluation for progression of ease with functional transfers, LE strength, and safety in the home.  -     LTG 4 Progress Met  -     LTG 5 Patient able to tandem stance bilaterally with 1 finger hold > 30 seconds without LOB for improved core stabilization and decreased fall risk  -     LTG 5 Progress Ongoing  -               User Key  (r) = Recorded By, (t) = Taken By, (c) = Cosigned By      Initials Name Provider Type    Isabelle Acevedo, PT Physical Therapist                    Therapy Education  Education Details: updated/finalized HEP  Given: HEP, Symptoms/condition management, Pain management, Posture/body mechanics  Program: Reinforced, Progressed  How Provided: Verbal, Demonstration, Written  Provided to: Patient  Level of Understanding: Verbalized, Demonstrated              Time Calculation:   Start Time:  1005  Stop Time: 1045  Time Calculation (min): 40 min  Timed Charges  07931 - PT Therapeutic Exercise Minutes: 40  Total Minutes  Timed Charges Total Minutes: 40   Total Minutes: 40  Therapy Charges for Today       Code Description Service Date Service Provider Modifiers Qty    [id number]  PT THER PROC EA 15 MIN 10/4/2023 Isabelle Belle, PT GP 3                      Isabelle Belle, PT  10/4/2023

## 2023-10-06 ENCOUNTER — HOSPITAL ENCOUNTER (OUTPATIENT)
Dept: PHYSICAL THERAPY | Facility: HOSPITAL | Age: 78
Setting detail: THERAPIES SERIES
Discharge: HOME | End: 2023-10-06
Payer: MEDICARE

## 2023-10-06 DIAGNOSIS — S83.521D SPRAIN OF POSTERIOR CRUCIATE LIGAMENT OF RIGHT KNEE, SUBSEQUENT ENCOUNTER: ICD-10-CM

## 2023-10-06 DIAGNOSIS — S82.101D CLOSED FRACTURE OF PROXIMAL END OF RIGHT TIBIA WITH ROUTINE HEALING, UNSPECIFIED FRACTURE MORPHOLOGY, SUBSEQUENT ENCOUNTER: ICD-10-CM

## 2023-10-06 DIAGNOSIS — M25.561 ACUTE PAIN OF RIGHT KNEE: Primary | ICD-10-CM

## 2023-10-06 PROCEDURE — 97110 THERAPEUTIC EXERCISES: CPT

## 2023-10-06 NOTE — THERAPY DISCHARGE NOTE
Outpatient Physical Therapy Ortho Treatment Note/Discharge Summary  [facility]     Patient Name: Kerrie Tyler  : 1945  MRN: [id number]  Today's Date: 10/6/2023      Visit Date: 10/06/2023    Visit Dx:    ICD-10-CM ICD-9-CM   1. Acute pain of right knee  M25.561 719.46   2. Closed fracture of proximal end of right tibia with routine healing, unspecified fracture morphology, subsequent encounter  S82.101D V54.16   3. 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    S/P CABG x 3    Benign prostatic hyperplasia with urinary hesitancy    Vitamin D deficiency    Presbycusis of both ears    Diabetic retinopathy associated with type 2 diabetes mellitus    Acute chest pain    Status post insertion of drug eluting coronary artery stent    Chest pain        Past Medical History:   Diagnosis Date    Kidney stone     Myocardial infarction         Past Surgical History:   Procedure Laterality Date    CARDIAC CATHETERIZATION N/A 2022    Procedure: Left Heart Cath  FEMORAL ACCESS;  Surgeon: Jam Gavin MD;  Location: [facility] CATH INVASIVE LOCATION;  Service: Cardiovascular;  Laterality: N/A;  Use femoral access- patient has bilateral CAMERON's with previous CABG    CARDIAC CATHETERIZATION N/A 2022    Procedure: Left ventriculography;  Surgeon: Jam Gavin MD;  Location: [facility] CATH INVASIVE LOCATION;  Service: Cardiovascular;  Laterality: N/A;    CARDIAC CATHETERIZATION N/A 2022    Procedure: Native mammary injection;  Surgeon: Jam Gavin MD;  Location: [facility] CATH INVASIVE LOCATION;  Service: Cardiovascular;  Laterality: N/A;    CARDIAC CATHETERIZATION  2022     Procedure: RESTING FULL CYCLE RATIO;  Surgeon: Jam Gavin MD;  Location:  [facility] CATH INVASIVE LOCATION;  Service: Cardiovascular;;    CARDIAC CATHETERIZATION N/A 8/24/2022    Procedure: Stent LIZBET bypass graft;  Surgeon: Jam Gavin MD;  Location:  [facility] CATH INVASIVE LOCATION;  Service: Cardiovascular;  Laterality: N/A;    CARDIAC CATHETERIZATION N/A 8/24/2022    Procedure: Coronary angiography;  Surgeon: Jam Gavin MD;  Location:  [facility] CATH INVASIVE LOCATION;  Service: Cardiovascular;  Laterality: N/A;    CARDIAC CATHETERIZATION N/A 8/24/2022    Procedure: Percutaneous Coronary Intervention;  Surgeon: Jam Gavin MD;  Location:  [facility] CATH INVASIVE LOCATION;  Service: Cardiovascular;  Laterality: N/A;    CATARACT EXTRACTION, BILATERAL Bilateral 1995    CORONARY ARTERY BYPASS GRAFT  1994    3 vessel    EXTRACORPOREAL SHOCK WAVE LITHOTRIPSY (ESWL) Bilateral 2004    TONSILLECTOMY          PT Ortho       Row Name 10/06/23 1000       Balance Skills Training    Balance Comments B tandem stance > 30sec without UE  -MH              User Key  (r) = Recorded By, (t) = Taken By, (c) = Cosigned By      Initials Name Provider Type    Honey Gómez, PT Physical Therapist                                 PT Assessment/Plan       Row Name 10/06/23 1100          PT Assessment    Assessment Comments Kerrie Tyler was seen for 6 physical therapy sessions for R knee pain. X-rays performed in ED and negative for acute fracture but MD reviewed on 7/7/23 and reports posterior tibial plateau fracture laterally as well as MRI indicating avulsion fracture of posterior tibial intercondylar eminence at the distal PCL and sprain of PCL and all other ligaments intact. Initially had gap in beginning PT following evaluation due to kidney stone, since resuming PT reports overall improvement in condition. Continues with complaints of lingering R groin pain. Treatment included therapeutic exercise and patient education  with home exercise program . Progress to physical therapy goals was good as pt. Met 3/3 STGs and 5/5 LTGs. He demonstrates improved independence with current program with reduced cueing. Since beginning PT demonstrates improved R knee AROM, repetitions on 30sec STS, able to maintain B tandem stance >30 sec without support, and >50% improvement in R hip/knee pain. He was discharged to an independent [facility] and provided patient education to self-manage condition  -        PT Plan    PT Plan Comments D/C  -               User Key  (r) = Recorded By, (t) = Taken By, (c) = Cosigned By      Initials Name Provider Type     Honey Morrissey, PT Physical Therapist                         OP Exercises       Row Name 10/06/23 1000             Subjective    Subjective Comments I will keep doing things on my own  -         Total Minutes    79280 - PT Therapeutic Exercise Minutes 33  -MH         Exercise 1    Exercise Name 1 nustep  -MH      Cueing 1 Verbal;Demo  -MH      Time 1 5 mins  -MH         Exercise 3    Exercise Name 3 glute bridge  -MH      Cueing 3 Verbal  -MH      Sets 3 2  -MH      Reps 3 15  -MH      Time 3 5s  -MH      Additional Comments RTB  -MH         Exercise 6    Exercise Name 6 QS + SLR  -MH      Cueing 6 Verbal  -MH      Sets 6 2 BL  -MH      Reps 6 15  -MH         Exercise 7    Exercise Name 7 Clamshell  -MH      Cueing 7 Verbal;Tactile;Demo  -MH      Sets 7 2  -MH      Reps 7 15  -MH      Time 7 RTB  -MH         Exercise 9    Exercise Name 9 STS  -MH      Cueing 9 Verbal  -MH      Sets 9 2  -MH      Reps 9 10  -MH         Exercise 11    Exercise Name 11 Supine figure 4 IR stretch and piriformis stretch  -MH      Cueing 11 Verbal;Tactile;Demo  -MH      Reps 11 3B  -MH      Time 11 20s  -MH         Exercise 12    Exercise Name 12 lateral stepping  -MH      Cueing 12 Verbal;Demo  -MH      Reps 12 3 laps  -MH      Time 12 RTB  -MH         Exercise 13    Exercise Name 13 monster walks  -MH      Cueing 13  Verbal;Demo  -      Reps 13 3 laps  -      Time 13 RTB  -                User Key  (r) = Recorded By, (t) = Taken By, (c) = Cosigned By      Initials Name Provider Type     Honey Morrissey, PT Physical Therapist                                    PT OP Goals       Row Name 10/06/23 1000          PT Short Term Goals    STG Date to Achieve 09/16/23  -     STG 1 Pt will be independent with initial HEP to improve strength/ROM and decrease pain.  -     STG 1 Progress Met  -     STG 1 Progress Comments reports compliance  -     STG 2 Patient will report 25-50% reduction in R hip/knee pain when performing hip flexion based activities within his ADLs to indicate improved activity tolerance and return to PLOF.  -     STG 2 Progress Met  [facility]     STG 3 Pt performs 30 seconds sit to stand having complete at least 2 more repetitions that was performed upon initial evaluation for progression of ease with functional transfers, LE strength, and safety in the home.  -     STG 3 Progress Met  [facility]        Long Term Goals    LTG Date to Achieve 10/16/23  -     LTG 1 Pt will be independent with advance HEP to improve strength/ROM and decrease pain.  -     LTG 1 Progress Met  [facility]     LTG 1 Progress Comments reports compliance  -     LTG 2 Pt will improve R knee AROM from lacking 3-115 degrees to 0-120 degrees to improve ability to navigate stairs.  -     LTG 2 Progress Met  [facility]     LTG 3 Pt will improve KOS score to 85% ability to show improved quality of life.  -     LTG 3 Progress Met  [facility]     LTG 3 Progress Comments 86%  -     LTG 4 Pt performs 30 seconds sit to stand having complete at least 3-4 more repetitions that was performed upon initial evaluation for progression of ease with functional transfers, LE strength, and safety in the home.  -     LTG 4 Progress Met  [facility]     LTG 5 Patient able to tandem stance bilaterally with 1 finger hold > 30 seconds without LOB for improved core stabilization and  decreased fall risk  -     LTG 5 Progress Met  -     LT 5 Progress Comments 30sec B without UE  -               User Key  (r) = Recorded By, (t) = Taken By, (c) = Cosigned By      Initials Name Provider Type    Honey Gómez PT Physical Therapist                         Outcome Measure Options: Knee Outcome Score- ADL  Knee Outcome Survey Activities of Daily Living Scale  Symptoms: Pain: I have the symptom, but it does not affect my activity  Symptoms: Stiffness: The symptom affects my activity slightly  Symptoms: Swelling: I do not have the symptom  Symptoms: Giving way, buckling, or shifting of the knee: I do not have the symptom  Symptoms: Weakness: I do not have the symptom  Symptoms: Limping: I do not have the symptom  Functional Limitations with ADL's: Walk: Activity is not difficult  Functional Limitations with ADL's: Go up stairs: Activity is not difficult  Functional Limitations with ADL's: Go down stairs: Activity is not difficult  Functional Limitations with ADL's: Stand: Activity is not difficult  Functional Limitations with ADL's: Kneel on front of your knee: Activity is fairly difficult  Functional Limitations with ADL's: Squat: Activity is somewhat difficult  Functional Limitations with ADL's: Sit with your knee bent: Activity is somewhat difficult  Functional Limitations with ADL's: Rise from a chair: Activity is not difficult  Knee Outcome Summary ADL's Score: 85.71 %      Time Calculation:   Start Time: 1030  Stop Time: 1103  Time Calculation (min): 33 min  Total Timed Code Minutes- PT: 33 minute(s)  Timed Charges  37251 - PT Therapeutic Exercise Minutes: 33  Total Minutes  Timed Charges Total Minutes: 33   Total Minutes: 33  Therapy Charges for Today       Code Description Service Date Service Provider Modifiers Qty    [id number] HC PT THER PROC EA 15 MIN 10/6/2023 Honey Morrissey, PT GP 2            PT G-Codes  Outcome Measure Options: Knee Outcome Score- ADL            Honey  Ghanshyam, PT  10/6/2023

## 2023-12-14 DIAGNOSIS — R39.11 BENIGN PROSTATIC HYPERPLASIA WITH URINARY HESITANCY: Primary | ICD-10-CM

## 2023-12-14 DIAGNOSIS — N40.1 BENIGN PROSTATIC HYPERPLASIA WITH URINARY HESITANCY: Primary | ICD-10-CM

## 2023-12-14 RX ORDER — TAMSULOSIN HYDROCHLORIDE 0.4 MG/1
CAPSULE ORAL
Qty: 90 CAPSULE | Refills: 0 | Status: SHIPPED | OUTPATIENT
Start: 2023-12-14

## 2023-12-16 DIAGNOSIS — R39.11 BENIGN PROSTATIC HYPERPLASIA WITH URINARY HESITANCY: ICD-10-CM

## 2023-12-16 DIAGNOSIS — N40.1 BENIGN PROSTATIC HYPERPLASIA WITH URINARY HESITANCY: ICD-10-CM

## 2023-12-18 RX ORDER — TAMSULOSIN HYDROCHLORIDE 0.4 MG/1
CAPSULE ORAL
Qty: 90 CAPSULE | Refills: 0 | OUTPATIENT
Start: 2023-12-18

## 2024-01-31 ENCOUNTER — TRANSCRIBE ORDERS (OUTPATIENT)
Dept: PHYSICAL THERAPY | Facility: HOSPITAL | Age: 79
End: 2024-01-31
Payer: MEDICARE

## 2024-01-31 DIAGNOSIS — M79.604 RIGHT LEG PAIN: Primary | ICD-10-CM

## 2024-02-01 ENCOUNTER — HOSPITAL ENCOUNTER (OUTPATIENT)
Dept: PHYSICAL THERAPY | Facility: HOSPITAL | Age: 79
Discharge: HOME | End: 2024-02-01
Admitting: ORTHOPAEDIC SURGERY
Payer: MEDICARE

## 2024-02-01 DIAGNOSIS — M25.851 FEMOROACETABULAR IMPINGEMENT OF RIGHT HIP: ICD-10-CM

## 2024-02-01 DIAGNOSIS — M79.604 PAIN IN RIGHT LEG: Primary | ICD-10-CM

## 2024-02-01 PROCEDURE — 97161 PT EVAL LOW COMPLEX 20 MIN: CPT

## 2024-02-01 PROCEDURE — 97140 MANUAL THERAPY 1/> REGIONS: CPT

## 2024-02-01 NOTE — THERAPY EVALUATION
Outpatient Physical Therapy Ortho Initial Evaluation  [facility]     Patient Name: Kerrie Tyler  : 1945  MRN: [id number]  Today's Date: 2024      Visit Date: 2024    Patient Active Problem List   Diagnosis    Type 2 diabetes mellitus without complication, without long-term current use of insulin    Mixed hyperlipidemia    Essential hypertension    Coronary artery disease of bypass graft of native heart with stable angina pectoris    S/P CABG x 3    Benign prostatic hyperplasia with urinary hesitancy    Vitamin D deficiency    Presbycusis of both ears    Diabetic retinopathy associated with type 2 diabetes mellitus    Acute chest pain    Status post insertion of drug eluting coronary artery stent    Chest pain        Past Medical History:   Diagnosis Date    Kidney stone     Myocardial infarction         Past Surgical History:   Procedure Laterality Date    CARDIAC CATHETERIZATION N/A 2022    Procedure: Left Heart Cath  FEMORAL ACCESS;  Surgeon: Jam Gavin MD;  Location: [facility] CATH INVASIVE LOCATION;  Service: Cardiovascular;  Laterality: N/A;  Use femoral access- patient has bilateral CAMERON's with previous CABG    CARDIAC CATHETERIZATION N/A 2022    Procedure: Left ventriculography;  Surgeon: Jam Gavin MD;  Location: [facility] CATH INVASIVE LOCATION;  Service: Cardiovascular;  Laterality: N/A;    CARDIAC CATHETERIZATION N/A 2022    Procedure: Native mammary injection;  Surgeon: Jam Gavin MD;  Location: [facility] CATH INVASIVE LOCATION;  Service: Cardiovascular;  Laterality: N/A;    CARDIAC CATHETERIZATION  2022    Procedure: RESTING FULL CYCLE RATIO;  Surgeon: Jam Gavin MD;  Location: [facility] CATH INVASIVE LOCATION;  Service: Cardiovascular;;    CARDIAC CATHETERIZATION N/A 2022    Procedure: Stent LIZBET bypass graft;  Surgeon: Jam Gavin MD;  Location: [facility] CATH INVASIVE LOCATION;  Service: Cardiovascular;  Laterality: N/A;    CARDIAC  CATHETERIZATION N/A 8/24/2022    Procedure: Coronary angiography;  Surgeon: Jam Gavin MD;  Location:  [facility] CATH INVASIVE LOCATION;  Service: Cardiovascular;  Laterality: N/A;    CARDIAC CATHETERIZATION N/A 8/24/2022    Procedure: Percutaneous Coronary Intervention;  Surgeon: Jam Gavin MD;  Location:  [facility] CATH INVASIVE LOCATION;  Service: Cardiovascular;  Laterality: N/A;    CATARACT EXTRACTION, BILATERAL Bilateral 1995    CORONARY ARTERY BYPASS GRAFT  1994    3 vessel    EXTRACORPOREAL SHOCK WAVE LITHOTRIPSY (ESWL) Bilateral 2004    TONSILLECTOMY         Visit Dx:     ICD-10-CM ICD-9-CM   1. Pain in right leg  M79.604 729.5   2. Femoroacetabular impingement of right hip  M25.851 719.95          Patient History       Row Name 02/01/24 1400             History    Chief Complaint Pain;Muscle weakness;Difficulty with daily activities  -ZB      Type of Pain Hip pain;Knee pain  -ZB      Date Current Problem(s) Began 07/01/23  -ZB      Brief Description of Current Complaint The patient presents with primary complaints of R sided hip and knee pain that have been present since he suffered a fall in July of last year. Per patient report, imaging at that time revealed posterior tibial plateau fracture that was managed nonoperatively. He began physical therapy at this clinic but did not finish due to suffering from a kidney stone. He denies numbness, tingling, or other sensation changes. Denies bowel/bladder changes. His pain is exacerbated when performing any motion that requires hip flexion or ER, at which he feels pain in the crease of his anterior hip. He notices this pain especially when attempting to don socks or pants from the seated position. He reports difficulty rising from a seated position if sitting for prolonged period of time. He denies any easing factors, and does not use any pain medications. He reports that recent MRI and X-ray are unremarkable. He likes to use the stationary bike at home, and  has not had issues with this since his injury. He states that walking does not cause significant pain.  -ZB      Previous treatment for THIS PROBLEM Other (comment)  unfinished bout of therapy  -ZB      Patient/Caregiver Goals Relieve pain;Return to prior level of function;Improve mobility;Know what to do to help the symptoms  -ZB      What clinical tests have you had for this problem? X-ray;MRI  -ZB         Daily Activities    Primary Language English  -ZB      Are you able to read Yes  -ZB      Are you able to write Yes  -ZB      How does patient learn best? Listening;Reading;Demonstration;Pictures/Video  -ZB      Teaching needs identified Home Exercise Program;Management of Condition  -ZB      Barriers to learning None  -ZB      Pt Participated in POC and Goals Yes  -ZB                User Key  (r) = Recorded By, (t) = Taken By, (c) = Cosigned By      Initials Name Provider Type    ZB Chris Olea, PT Physical Therapist                     PT Ortho       Row Name 02/01/24 1600       Subjective Pain    Pre-Treatment Pain Level 4  -ZB       Quarter Clearing    Quarter Clearing Lower Quarter Clearing  -ZB       Neural Tension Signs- Lower Quarter Clearing    Slump Bilateral:;Negative  -ZB    SLR Bilateral:;Negative  -ZB    Prone knee flexion Bilateral:;Negative  -ZB       Pathological Reflexes- Lower Quarter Clearing    Clonus Bilateral:;Negative  -ZB       Sensory Screen for Light Touch- Lower Quarter Clearing    L1 (inguinal area) Bilateral:;Intact  -ZB    L2 (anterior mid thigh) Bilateral:;Intact  -ZB    L3 (distal anterior thigh) Bilateral:;Intact  -ZB    L4 (medial lower leg/foot) Bilateral:;Intact  -ZB    L5 (lateral lower leg/great toe) Bilateral:;Intact  -ZB    S1 (bottom of foot) Bilateral:;Intact  -ZB       Myotomal Screen- Lower Quarter Clearing    Hip flexion (L2) 4- (Good -)  -ZB    Knee extension (L3) 4 (Good)  -ZB    Ankle DF (L4) 4 (Good)  -ZB    Great toe extension (L5) 4 (Good)  -ZB    Ankle PF  (S1) 4 (Good)  -ZB    Knee flexion (S2) 4- (Good -)  -ZB       Lumbar ROM Screen- Lower Quarter Clearing    Lumbar Flexion Normal  -ZB    Lumbar Extension Normal  -ZB    Lumbar Lateral Flexion Normal  -ZB    Lumbar Rotation Normal  -ZB       SI/Hip Screen- Lower Quarter Clearing    Gaenslen's test Right:;Positive  test reproduces R anterior hip crease pain when R hip flexed  -ZB    ASIS compression Bilateral:;Negative  -ZB    ASIS distraction Bilateral:;Negative  -ZB    Roma's/Robert's test Bilateral:;Negative  -ZB    Posterior thigh sheer Bilateral:;Negative  -ZB       Special Tests/Palpation    Special Tests/Palpation Hip  -ZB       Hip/Thigh Palpation    Hip/Thigh Palpation? Yes  -ZB       Hip Special Tests    ROMA (hip vs SI pathology) Bilateral:;Negative  -ZB    Hip scour test (labral vs hip pathology) Bilateral:;Negative  -ZB       Knee Palpation    Knee Palpation? Yes  -ZB    Patella --  no TTP  -ZB    Patella Tendon --  no TTP  -ZB    Pes Anserine Bursa --  no TTP  -ZB    Suprapatella Bursa --  no TTP  -ZB    Medial Joint Line --  no TTP  -ZB    Lateral Joint Line Right:  mildly tender  -ZB    Posterior Joint Line --  no TTP  -ZB    Lateral Condyle --  no TTP  -ZB    Medial Condyle --  no TTP  -ZB    Fibular Head Right:;Tender  superior/anterior aspect mildly tender  -ZB    Biceps Femoris --  no TTP  -ZB    Semimembranosis --  no TTP  -ZB    Semitendinosis --  no TTP  -ZB       General ROM    RT Lower Ext Rt Hip External Rotation;Rt Hip Internal Rotation;Rt Knee Extension/Flexion;Rt Hip ABduction  -ZB    LT Lower Ext Lt Hip External Rotation;Lt Hip Internal Rotation;Lt Knee Extension/Flexion;Lt Hip ABduction  -ZB       Right Lower Ext    Rt Hip ABduction AROM WNL  -ZB    Rt Hip External Rotation AROM 35  -ZB    Rt Hip Internal Rotation AROM 11  -ZB    Rt Knee Extension/Flexion AROM WNL  -ZB       Left Lower Ext    Lt Hip ABduction AROM WNL  -ZB    Lt Hip External Rotation AROM 36  -ZB    Lt Hip Internal  Rotation AROM 12  -ZB    Lt Knee Extension/Flexion AROM WNL  -ZB       MMT (Manual Muscle Testing)    Rt Lower Ext Rt Hip Flexion;Rt Hip Extension;Rt Hip ABduction;Rt Hip Internal (Medial) Rotation;Rt Hip External (Lateral) Rotation;Rt Knee Extension;Rt Knee Flexion;Rt Ankle Dorsiflexion  -ZB    Lt Lower Ext Lt Hip Flexion;Lt Hip Extension;Lt Hip ABduction;Lt Hip Internal (Medial) Rotation;Lt Hip External (Lateral) Rotation;Lt Knee Extension;Lt Knee Flexion;Lt Ankle Dorsiflexion  -ZB       MMT Right Lower Ext    Rt Hip Flexion MMT, Gross Movement (4-/5) good minus  -ZB    Rt Hip Extension MMT, Gross Movement (3+/5) fair plus  -ZB    Rt Hip ABduction MMT, Gross Movement (3+/5) fair plus  -ZB    Rt Hip Internal (Medial) Rotation MMT, Gross Movement (4/5) good  -ZB    Rt Hip External (Lateral) Rotation MMT, Gross Movement (4/5) good  -ZB    Rt Knee Extension MMT, Gross Movement (4/5) good  -ZB    Rt Knee Flexion MMT, Gross Movement (4-/5) good minus  -ZB    Rt Ankle Dorsiflexion MMT, Gross Movement (4/5) good  -ZB       MMT Left Lower Ext    Lt Hip Flexion MMT, Gross Movement (4-/5) good minus  -ZB    Lt Hip Extension MMT, Gross Movement (3+/5) fair plus  -ZB    Lt Hip ABduction MMT, Gross Movement (3+/5) fair plus  -ZB    Lt Hip Internal (Medial) Rotation MMT, Gross Movement (4/5) good  -ZB    Lt Hip External (Lateral) Rotation MMT, Gross Movement (4/5) good  -ZB    Lt Knee Extension MMT, Gross Movement (4/5) good  -ZB    Lt Knee Flexion MMT, Gross Movement (4-/5) good minus  -ZB    Lt Ankle Dorsiflexion MMT, Gross Movement (4/5) good  -ZB       Sensation    Sensation WNL? WNL  -ZB    Light Touch No apparent deficits  -ZB    Sharp/Dull No apparent deficits  -ZB       Flexibility    Flexibility Tested? Lower Extremity  -ZB       Lower Extremity Flexibility    Hamstrings Bilateral:;Moderately limited  -ZB    Hip Flexors Bilateral:;Moderately limited  -ZB              User Key  (r) = Recorded By, (t) = Taken By, (c) =  Cosigned By      Initials Name Provider Type    Chris Tobar PT Physical Therapist                                Therapy Education  Education Details: Educated on anatomy/pathology, evaluation findings, therapy expectations, prognosis, POC and HEP: [id number].  Given: HEP, Symptoms/condition management, Pain management, Posture/body mechanics, Mobility training  Program: New  How Provided: Verbal, Demonstration, Written  Provided to: Patient  Level of Understanding: Teach back education performed, Verbalized, Demonstrated      PT OP Goals       Row Name 02/01/24 1600          PT Short Term Goals    STG Date to Achieve 03/02/24  -ZB     STG 1 Pt will be independent with initial HEP to improve strength/ROM and decrease pain.  -ZB     STG 1 Progress New  -ZB     STG 2 Patient will report decreased difficulty with donning socks via hip flexion and external rotation to demonstrate reduction in symptoms.  -ZB     STG 2 Progress New  -ZB     STG 3 Pt will improve B hip strength to at least 4+/5.  -ZB     STG 3 Progress New  -ZB        Long Term Goals    LTG Date to Achieve 04/01/24  -ZB     LTG 1 Pt will be independent with advanced HEP to maintain strength/ROM and decrease pain.  -ZB     LTG 1 Progress New  -ZB     LTG 2 Patient will demonstrate R hip IR AROM >/=25 degrees without pain.  -ZB     LTG 2 Progress New  -ZB     LTG 3 Patient will subjectively report 70% reduction in overall symptoms to demonstrate perceived improvement in overall condition.  -ZB     LTG 3 Progress New  -ZB        Time Calculation    PT Goal Re-Cert Due Date 05/01/24  -ZB               User Key  (r) = Recorded By, (t) = Taken By, (c) = Cosigned By      Initials Name Provider Type    Chris Tobar PT Physical Therapist                     PT Assessment/Plan       Row Name 02/01/24 4032          PT Assessment    Assessment Comments 78 y.o. male referred to outpatient physical therapy for evaluation and treatment of right hip and knee  pain.  Patient presents with primary complaints of R sided anterior hip crease, anterior thigh, and lateral knee pain that has been present since he suffered a fall in July of 2023. Per patient report the fall resulted in a R sided posterior tibial plateau fracture that was managed nonoperatively. He began a trial of physical therapy but states that he did not finish due to complications regarding a kidney stone. He presents today with primary difficulty during tasks that require hip flexion, which reproduces the pain in the anterior hip. He denies difficulty with walking or stationary biking, which are his main hobbies at home. Neurological scan unremarkable. SI/LBP provocation unremarkable. He demonstrates significant limitations in hip rotation ROM (IR>ER), hip and knee strength deficits, (+) FADIR, anterior hip crease pain with excessive hip flexion, and flexibility limitations of hamstrings, hip flexors, and IR/ER. Signs and symptoms are consistent with  physical therapy diagnosis of femoroacetabular impingement coupled with generalized LE weakness and flexibility limitations . This condition is stable. Recommend skilled PT to address functional deficits. Thank you for this referral.  -ZB     Please refer to paper survey for additional self-reported information No  -ZB     Rehab Potential Good  -ZB     Patient/caregiver participated in establishment of treatment plan and goals Yes  -ZB     Patient would benefit from skilled therapy intervention Yes  -ZB        PT Plan    PT Frequency 2x/week  -ZB     Predicted Duration of Therapy Intervention (PT) 6-8 weeks  -ZB     Planned CPT's? PT EVAL LOW COMPLEXITY: 05625;PT RE-EVAL: 55319;PT THER PROC EA 15 MIN: 73002;PT THER ACT EA 15 MIN: 80172;PT MANUAL THERAPY EA 15 MIN: 23241;PT NEUROMUSC RE-EDUCATION EA 15 MIN: 02831;PT GAIT TRAINING EA 15 MIN: 35561;PT SELF CARE/HOME MGMT/TRAIN EA 15: 41783  -ZB     PT Plan Comments Assess response to initial treatment and HEP.  Consider revisiting mob belt hip distraction with IR/ER using contract-relax. Review HEP stretches to ensure proper form. Consider warm up on stationary bike, bridges, LAQ, s/l clams abd/add, STS, partial lunge, side stepping ? Needs to complete outcome measure as he did not on arrival to [facility]               User Key  (r) = Recorded By, (t) = Taken By, (c) = Cosigned By      Initials Name Provider Type    Chris Tobar PT Physical Therapist                       OP Exercises       Row Name 02/01/24 1600             Subjective Pain    Pre-Treatment Pain Level 4  -ZB         Total Minutes    83831 - PT Therapeutic Exercise Minutes 5  -ZB      81466 - PT Manual Therapy Minutes 10  -ZB         Exercise 1    Exercise Name 1 Piriformis stretch  -ZB      Cueing 1 Verbal  -ZB      Reps 1 3  -ZB      Time 1 20s  -ZB      Additional Comments HEP  -ZB         Exercise 2    Exercise Name 2 Figure 4 stretch  -ZB      Cueing 2 Verbal  -ZB      Reps 2 3  -ZB      Time 2 20s  -ZB      Additional Comments HEP  -ZB         Exercise 3    Exercise Name 3 Seated HS stretch  -ZB      Cueing 3 Verbal  -ZB      Reps 3 3  -ZB      Time 3 20s  -ZB      Additional Comments HEP  -ZB         Exercise 4    Exercise Name 4 Supine SLR  -ZB      Cueing 4 Verbal  -ZB      Sets 4 2  -ZB      Reps 4 10  -ZB      Additional Comments HEP  -ZB         Exercise 5    Exercise Name 5 S/L hip ABD  -ZB      Cueing 5 Verbal  -ZB      Sets 5 2  -ZB      Reps 5 10  -ZB      Additional Comments HEP  -ZB                User Key  (r) = Recorded By, (t) = Taken By, (c) = Cosigned By      Initials Name Provider Type    Chris Tobar, PT Physical Therapist                  Manual Rx (last 36 hours)       Manual Treatments       Row Name 02/01/24 1600             Total Minutes    58548 - PT Manual Therapy Minutes 10  -ZB         Manual Rx 1    Manual Rx 1 Location R hip distraction @ 90 degree flexion using mob belt  -ZB      Manual Rx 1 Type With IR/ER  using contract-relax for deeper ROM  -ZB                User Key  (r) = Recorded By, (t) = Taken By, (c) = Cosigned By      Initials Name Provider Type    Chris Tobar, PT Physical Therapist                                          Time Calculation:     Start Time: 1045  Stop Time: 1130  Time Calculation (min): 45 min  Total Timed Code Minutes- PT: 15 minute(s)  Timed Charges  80357 - PT Therapeutic Exercise Minutes: 5  32325 - PT Manual Therapy Minutes: 10  Total Minutes  Timed Charges Total Minutes: 15   Total Minutes: 15     Therapy Charges for Today       Code Description Service Date Service Provider Modifiers Qty    37309579498 HC PT MANUAL THERAPY EA 15 MIN 2/1/2024 Chris Olea, PT GP 1    85184505634 HC PT EVAL LOW COMPLEXITY 2 2/1/2024 Chris Olea, PT GP 1                      Kaleb Olea PT  2/1/2024

## 2024-02-05 ENCOUNTER — HOSPITAL ENCOUNTER (OUTPATIENT)
Dept: PHYSICAL THERAPY | Facility: HOSPITAL | Age: 79
Discharge: HOME | End: 2024-02-05
Admitting: ORTHOPAEDIC SURGERY
Payer: MEDICARE

## 2024-02-05 DIAGNOSIS — M25.851 FEMOROACETABULAR IMPINGEMENT OF RIGHT HIP: ICD-10-CM

## 2024-02-05 DIAGNOSIS — M79.604 PAIN IN RIGHT LEG: Primary | ICD-10-CM

## 2024-02-05 PROCEDURE — 97140 MANUAL THERAPY 1/> REGIONS: CPT

## 2024-02-05 PROCEDURE — 97110 THERAPEUTIC EXERCISES: CPT

## 2024-02-05 NOTE — THERAPY TREATMENT NOTE
Outpatient Physical Therapy Ortho Treatment Note  [facility]     Patient Name: Kerrie Tyler  : 1945  MRN: [id number]  Today's Date: 2024      Visit Date: 2024    Visit Dx:    ICD-10-CM ICD-9-CM   1. Pain in right leg  M79.604 729.5   2. 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 with stable angina pectoris    S/P CABG x 3    Benign prostatic hyperplasia with urinary hesitancy    Vitamin D deficiency    Presbycusis of both ears    Diabetic retinopathy associated with type 2 diabetes mellitus    Acute chest pain    Status post insertion of drug eluting coronary artery stent    Chest pain        Past Medical History:   Diagnosis Date    Kidney stone     Myocardial infarction         Past Surgical History:   Procedure Laterality Date    CARDIAC CATHETERIZATION N/A 2022    Procedure: Left Heart Cath  FEMORAL ACCESS;  Surgeon: Jam Gavin MD;  Location: [facility] CATH INVASIVE LOCATION;  Service: Cardiovascular;  Laterality: N/A;  Use femoral access- patient has bilateral CAMERON's with previous CABG    CARDIAC CATHETERIZATION N/A 2022    Procedure: Left ventriculography;  Surgeon: Jam Gavin MD;  Location: [facility] CATH INVASIVE LOCATION;  Service: Cardiovascular;  Laterality: N/A;    CARDIAC CATHETERIZATION N/A 2022    Procedure: Native mammary injection;  Surgeon: Jam Gavin MD;  Location: [facility] CATH INVASIVE LOCATION;  Service: Cardiovascular;  Laterality: N/A;    CARDIAC CATHETERIZATION  2022    Procedure: RESTING FULL CYCLE RATIO;  Surgeon: Jam Gavin MD;  Location: [facility] CATH INVASIVE LOCATION;  Service: Cardiovascular;;    CARDIAC CATHETERIZATION N/A 2022    Procedure: Stent LIZBET bypass graft;   "Surgeon: Jam Gavin MD;  Location: [facility] CATH INVASIVE LOCATION;  Service: Cardiovascular;  Laterality: N/A;    CARDIAC CATHETERIZATION N/A 8/24/2022    Procedure: Coronary angiography;  Surgeon: Jam Gavin MD;  Location:  [facility] CATH INVASIVE LOCATION;  Service: Cardiovascular;  Laterality: N/A;    CARDIAC CATHETERIZATION N/A 8/24/2022    Procedure: Percutaneous Coronary Intervention;  Surgeon: Jam Gavin MD;  Location: 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 He reports adherence to provided HEP with good tolerance. Reviewed HEP this date with cues provided as needed. Initiated strengthening and stretching exercises this date with the patient reporting that he felt \"good\" to end the session. He continues to respond well to manual distraction and mobilization of the R hip. He remains a good candidate for skilled intervention to continue progressing toward established goals.  -ZB        PT Plan    PT Plan Comments Assess initial session, have the joint distractions provided any relief or improvement? Consider adding multidirectional partial lunge, monster walks, and/or side steps ?  -ZB               User Key  (r) = Recorded By, (t) = Taken By, (c) = Cosigned By      Initials Name Provider Type    ZB Chris Olea, PT Physical Therapist                       OP Exercises       Row Name 02/05/24 1000             Total Minutes    82686 - PT Therapeutic Exercise Minutes 30  -ZB      83632 - PT Manual Therapy Minutes 10  -ZB         Exercise 1    Exercise Name 1 Rec Bike  " -ZB      Cueing 1 --  -ZB      Reps 1 --  -ZB      Time 1 5 min  -ZB         Exercise 2    Exercise Name 2 Piriformis / figure 4 stretch  -ZB      Cueing 2 Verbal  -ZB      Reps 2 3ea  -ZB      Time 2 20s  -ZB         Exercise 3    Exercise Name 3 Seated HS stretch  -ZB      Cueing 3 Verbal  -ZB      Reps 3 3  -ZB      Time 3 20s  -ZB         Exercise 4    Exercise Name 4 Supine SLR  -ZB      Cueing 4 Verbal  -ZB      Sets 4 2  -ZB      Reps 4 10  -ZB         Exercise 5    Exercise Name 5 S/L clams  -ZB      Cueing 5 Verbal  -ZB      Sets 5 2B  -ZB      Reps 5 10  -ZB      Time 5 RTB  -ZB         Exercise 6    Exercise Name 6 STS partially elevated mat  -ZB      Cueing 6 Verbal;Demo  -ZB      Sets 6 2  -ZB      Reps 6 10  -ZB      Additional Comments w/o UE use  -ZB         Exercise 7    Exercise Name 7 Step up w/ knee drive  -ZB      Cueing 7 Verbal  -ZB      Reps 7 10B  -ZB         Exercise 8    Exercise Name 8 Lateral step down  -ZB      Cueing 8 Verbal;Demo  -ZB      Reps 8 10B  -ZB         Exercise 9    Exercise Name 9 FWD step down  -ZB      Cueing 9 Verbal  -ZB      Reps 9 10B  -ZB         Exercise 10    Exercise Name 10 Hip flexor stretch  -ZB      Cueing 10 Verbal  -ZB      Reps 10 3B  -ZB      Time 10 20s  -ZB      Additional Comments at steps  -ZB                User Key  (r) = Recorded By, (t) = Taken By, (c) = Cosigned By      Initials Name Provider Type    NHIB Chris Olea, PT Physical Therapist                             Manual Rx (last 36 hours)       Manual Treatments       Row Name 02/05/24 1000             Total Minutes    62973 - PT Manual Therapy Minutes 10  -ZB         Manual Rx 1    Manual Rx 1 Location R hip distraction @ 90 degree flexion using mob belt  -ZB      Manual Rx 1 Type With IR/ER using contract-relax for deeper ROM  -ZB                User Key  (r) = Recorded By, (t) = Taken By, (c) = Cosigned By      Initials Name Provider Type    NHIB Chris Olea, PT Physical Therapist                                        Time Calculation:   Start Time: 1050  Stop Time: 1130  Time Calculation (min): 40 min  Total Timed Code Minutes- PT: 40 minute(s)  Timed Charges  81476 - PT Therapeutic Exercise Minutes: 30  64475 - PT Manual Therapy Minutes: 10  Total Minutes  Timed Charges Total Minutes: 40   Total Minutes: 40  Therapy Charges for Today       Code Description Service Date Service Provider Modifiers Qty    68281441494 HC PT THER PROC EA 15 MIN 2/5/2024 Chris Olea, PT GP 2    68507493008 HC PT MANUAL THERAPY EA 15 MIN 2/5/2024 Chris Olea, PT GP 1                      Kaleb Olea PT  2/5/2024

## 2024-02-08 ENCOUNTER — HOSPITAL ENCOUNTER (OUTPATIENT)
Dept: PHYSICAL THERAPY | Facility: HOSPITAL | Age: 79
Setting detail: THERAPIES SERIES
Discharge: HOME | End: 2024-02-08
Payer: MEDICARE

## 2024-02-08 DIAGNOSIS — M79.604 PAIN IN RIGHT LEG: Primary | ICD-10-CM

## 2024-02-08 DIAGNOSIS — M25.851 FEMOROACETABULAR IMPINGEMENT OF RIGHT HIP: ICD-10-CM

## 2024-02-08 PROCEDURE — 97110 THERAPEUTIC EXERCISES: CPT

## 2024-02-08 NOTE — THERAPY TREATMENT NOTE
Outpatient Physical Therapy Ortho Treatment Note  [facility]     Patient Name: Kerrie Tyler  : 1945  MRN: [id number]  Today's Date: 2024      Visit Date: 2024    Visit Dx:    ICD-10-CM ICD-9-CM   1. Pain in right leg  M79.604 729.5   2. 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 with stable angina pectoris    S/P CABG x 3    Benign prostatic hyperplasia with urinary hesitancy    Vitamin D deficiency    Presbycusis of both ears    Diabetic retinopathy associated with type 2 diabetes mellitus    Acute chest pain    Status post insertion of drug eluting coronary artery stent    Chest pain        Past Medical History:   Diagnosis Date    Kidney stone     Myocardial infarction         Past Surgical History:   Procedure Laterality Date    CARDIAC CATHETERIZATION N/A 2022    Procedure: Left Heart Cath  FEMORAL ACCESS;  Surgeon: Jam Gavin MD;  Location: [facility] CATH INVASIVE LOCATION;  Service: Cardiovascular;  Laterality: N/A;  Use femoral access- patient has bilateral CAMERON's with previous CABG    CARDIAC CATHETERIZATION N/A 2022    Procedure: Left ventriculography;  Surgeon: Jam Gavin MD;  Location: [facility] CATH INVASIVE LOCATION;  Service: Cardiovascular;  Laterality: N/A;    CARDIAC CATHETERIZATION N/A 2022    Procedure: Native mammary injection;  Surgeon: Jam Gavin MD;  Location: [facility] CATH INVASIVE LOCATION;  Service: Cardiovascular;  Laterality: N/A;    CARDIAC CATHETERIZATION  2022    Procedure: RESTING FULL CYCLE RATIO;  Surgeon: Jam Gavin MD;  Location: [facility] CATH INVASIVE LOCATION;  Service: Cardiovascular;;    CARDIAC CATHETERIZATION N/A 2022    Procedure: Stent LIZBET bypass graft;   "Surgeon: Jam Gavin MD;  Location: [facility] CATH INVASIVE LOCATION;  Service: Cardiovascular;  Laterality: N/A;    CARDIAC CATHETERIZATION N/A 8/24/2022    Procedure: Coronary angiography;  Surgeon: Jam Gavin MD;  Location: [facility] CATH INVASIVE LOCATION;  Service: Cardiovascular;  Laterality: N/A;    CARDIAC CATHETERIZATION N/A 8/24/2022    Procedure: Percutaneous Coronary Intervention;  Surgeon: Jam Gavin MD;  Location: [facility] CATH INVASIVE LOCATION;  Service: Cardiovascular;  Laterality: N/A;    CATARACT EXTRACTION, BILATERAL Bilateral 1995    CORONARY ARTERY BYPASS GRAFT  1994    3 vessel    EXTRACORPOREAL SHOCK WAVE LITHOTRIPSY (ESWL) Bilateral 2004    TONSILLECTOMY                          PT Assessment/Plan       Row Name 02/08/24 1212          PT Assessment    Assessment Comments 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 He continues to report adherence to HEP with good tolerance. Progressed strengthening exercises this date to include side stepping, monster walks, weighted hip IR/ER, and partial lunges all with good tolerance. He stated that he felt the exercises were \"good\" today to end the session. Deferred hip mobilization this date as the patient reported little to no change with the mobs. He remains a good candidate for skilled therapy at this time.  -ZB        PT Plan    PT Plan Comments Continue progressing hip/LE strengthening and ROM as tolerated. Reponse to progression last visit? Consider adding wt to reverse clams, 3D hip on foam, progress STS to lower surface height.  -ZB               User Key  (r) = Recorded By, (t) = Taken By, (c) = Cosigned By      Initials Name Provider Type    ZB Chris Olea, PT Physical Therapist                       OP Exercises       Row Name 02/08/24 1000             Total Minutes    14088 - PT Therapeutic Exercise Minutes 40  -ZB         Exercise 1    Exercise Name 1 Rec Bike  -ZB      Time 1 5 min  -ZB         Exercise " 2    Exercise Name 2 Piriformis / figure 4 stretch  -ZB      Cueing 2 Verbal  -ZB      Reps 2 3ea  -ZB      Time 2 20s  -ZB         Exercise 3    Exercise Name 3 Seated HS stretch  -ZB      Cueing 3 Verbal  -ZB      Reps 3 3  -ZB      Time 3 20s  -ZB         Exercise 4    Exercise Name 4 Supine SLR  -ZB      Cueing 4 Verbal  -ZB      Sets 4 2  -ZB      Reps 4 10  -ZB         Exercise 5    Exercise Name 5 S/L clams, reverse clams  -ZB      Cueing 5 Verbal  -ZB      Reps 5 10ea  -ZB      Time 5 RTB, no resistance reverse pos.  -ZB         Exercise 6    Exercise Name 6 STS partially elevated mat  -ZB      Cueing 6 Verbal;Demo  -ZB      Sets 6 2  -ZB      Reps 6 10  -ZB      Additional Comments w/o UE use, controlled descent  -ZB         Exercise 7    Exercise Name 7 Step up w/ knee drive  -ZB      Cueing 7 Verbal  -ZB      Reps 7 10B  -ZB         Exercise 8    Exercise Name 8 Lateral step down  -ZB      Cueing 8 Verbal;Demo  -ZB      Reps 8 10B  -ZB         Exercise 9    Exercise Name 9 FWD step down  -ZB      Cueing 9 Verbal  -ZB      Reps 9 10B  -ZB         Exercise 10    Exercise Name 10 Hip flexor stretch  -ZB      Cueing 10 Verbal  -ZB      Reps 10 3B  -ZB      Time 10 20s  -ZB      Additional Comments at steps  -ZB         Exercise 11    Exercise Name 11 H/L hip ADD  -ZB      Cueing 11 Verbal  -ZB      Reps 11 10  -ZB      Time 11 5s  -ZB         Exercise 12    Exercise Name 12 seated hip IR, ER  -ZB      Cueing 12 Verbal;Demo  -ZB      Reps 12 2ea  -ZB      Time 12 10  -ZB      Additional Comments 2# ankle wt  -ZB         Exercise 13    Exercise Name 13 Partial lunge star  -ZB      Cueing 13 Verbal  -ZB      Reps 13 5B, ea direction  -ZB      Time 13 fwd/lat/retro  -ZB         Exercise 14    Exercise Name 14 Resisted side stepping, monster walk  -ZB      Cueing 14 Verbal  -ZB      Sets 14 2 laps ea  -ZB      Time 14 RTB  -ZB                User Key  (r) = Recorded By, (t) = Taken By, (c) = Cosigned By       Initials Name Provider Type    Chris Tobar, PT Physical Therapist                                  PT OP Goals       Row Name 02/08/24 1200          PT Short Term Goals    STG Date to Achieve 03/02/24  -ZB     STG 1 Pt will be independent with initial HEP to improve strength/ROM and decrease pain.  -ZB     STG 1 Progress Ongoing  -ZB     STG 2 Patient will report decreased difficulty with donning socks via hip flexion and external rotation to demonstrate reduction in symptoms.  -ZB     STG 2 Progress Ongoing  -ZB     STG 3 Pt will improve B hip strength to at least 4+/5.  -ZB     STG 3 Progress Ongoing  -ZB        Long Term Goals    LTG Date to Achieve 04/01/24  -ZB     LTG 1 Pt will be independent with advanced HEP to maintain strength/ROM and decrease pain.  -ZB     LTG 1 Progress Ongoing  -ZB     LTG 2 Patient will demonstrate R hip IR AROM >/=25 degrees without pain.  -ZB     LTG 2 Progress Ongoing  -ZB     LTG 3 Patient will subjectively report 70% reduction in overall symptoms to demonstrate perceived improvement in overall condition.  -ZB     LTG 3 Progress Ongoing  -ZB               User Key  (r) = Recorded By, (t) = Taken By, (c) = Cosigned By      Initials Name Provider Type    Chris Tobar, PT Physical Therapist                                   Time Calculation:   Start Time: 1040  Stop Time: 1120  Time Calculation (min): 40 min  Total Timed Code Minutes- PT: 40 minute(s)  Timed Charges  36987 - PT Therapeutic Exercise Minutes: 40  Total Minutes  Timed Charges Total Minutes: 40   Total Minutes: 40  Therapy Charges for Today       Code Description Service Date Service Provider Modifiers Qty    19229440900 HC PT THER PROC EA 15 MIN 2/8/2024 Chris Olea, PT GP 3                      Kaleb Olea PT  2/8/2024

## 2024-02-14 ENCOUNTER — HOSPITAL ENCOUNTER (OUTPATIENT)
Dept: PHYSICAL THERAPY | Facility: HOSPITAL | Age: 79
Setting detail: THERAPIES SERIES
Discharge: HOME | End: 2024-02-14
Payer: MEDICARE

## 2024-02-14 DIAGNOSIS — I10 ESSENTIAL HYPERTENSION: ICD-10-CM

## 2024-02-14 DIAGNOSIS — M25.851 FEMOROACETABULAR IMPINGEMENT OF RIGHT HIP: ICD-10-CM

## 2024-02-14 DIAGNOSIS — M79.604 PAIN IN RIGHT LEG: Primary | ICD-10-CM

## 2024-02-14 PROCEDURE — 97110 THERAPEUTIC EXERCISES: CPT

## 2024-02-14 RX ORDER — TRANDOLAPRIL TABLETS 4 MG/1
4 TABLET ORAL 2 TIMES DAILY
Qty: 180 TABLET | Refills: 1 | Status: SHIPPED | OUTPATIENT
Start: 2024-02-14

## 2024-02-16 ENCOUNTER — HOSPITAL ENCOUNTER (OUTPATIENT)
Dept: PHYSICAL THERAPY | Facility: HOSPITAL | Age: 79
Setting detail: THERAPIES SERIES
Discharge: HOME | End: 2024-02-16
Payer: MEDICARE

## 2024-02-16 DIAGNOSIS — M25.851 FEMOROACETABULAR IMPINGEMENT OF RIGHT HIP: ICD-10-CM

## 2024-02-16 DIAGNOSIS — M79.604 PAIN IN RIGHT LEG: Primary | ICD-10-CM

## 2024-02-16 PROCEDURE — 97110 THERAPEUTIC EXERCISES: CPT

## 2024-02-20 ENCOUNTER — HOSPITAL ENCOUNTER (OUTPATIENT)
Dept: PHYSICAL THERAPY | Facility: HOSPITAL | Age: 79
Setting detail: THERAPIES SERIES
Discharge: HOME | End: 2024-02-20
Payer: MEDICARE

## 2024-02-20 DIAGNOSIS — M79.604 PAIN IN RIGHT LEG: Primary | ICD-10-CM

## 2024-02-20 DIAGNOSIS — M25.851 FEMOROACETABULAR IMPINGEMENT OF RIGHT HIP: ICD-10-CM

## 2024-02-20 DIAGNOSIS — M25.561 ACUTE PAIN OF RIGHT KNEE: ICD-10-CM

## 2024-02-20 PROCEDURE — 97110 THERAPEUTIC EXERCISES: CPT

## 2024-02-20 PROCEDURE — 97140 MANUAL THERAPY 1/> REGIONS: CPT

## 2024-02-20 NOTE — THERAPY TREATMENT NOTE
Outpatient Physical Therapy Ortho Treatment Note  [facility]     Patient Name: Kerrie Tyler  : 1945  MRN: [id number]  Today's Date: 2024      Visit Date: 2024    Visit Dx:    ICD-10-CM ICD-9-CM   1. Pain in right leg  M79.604 729.5   2. Femoroacetabular impingement of right hip  M25.851 719.95   3. 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 CATH INVASIVE LOCATION;  Service: Cardiovascular;  Laterality: N/A;  Use femoral access- patient has bilateral CAMERON's with previous CABG    CARDIAC CATHETERIZATION N/A 2022    Procedure: Left ventriculography;  Surgeon: Jam Gavin MD;  Location: [facility] CATH INVASIVE LOCATION;  Service: Cardiovascular;  Laterality: N/A;    CARDIAC CATHETERIZATION N/A 2022    Procedure: Native mammary injection;  Surgeon: Jam Gavin MD;  Location: [facility] CATH INVASIVE LOCATION;  Service: Cardiovascular;  Laterality: N/A;    CARDIAC CATHETERIZATION  2022    Procedure: RESTING FULL CYCLE RATIO;  Surgeon: Jam Gavin MD;  Location: [facility] CATH INVASIVE LOCATION;  Service: Cardiovascular;;    CARDIAC CATHETERIZATION N/A  8/24/2022    Procedure: Stent LIZBET bypass graft;  Surgeon: Jam Gavin MD;  Location:  [facility] CATH INVASIVE LOCATION;  Service: Cardiovascular;  Laterality: N/A;    CARDIAC CATHETERIZATION N/A 8/24/2022    Procedure: Coronary angiography;  Surgeon: Jam Gavin MD;  Location:  [facility] CATH INVASIVE LOCATION;  Service: Cardiovascular;  Laterality: N/A;    CARDIAC CATHETERIZATION N/A 8/24/2022    Procedure: Percutaneous Coronary Intervention;  Surgeon: Jam Gavin MD;  Location:  [facility] CATH INVASIVE LOCATION;  Service: Cardiovascular;  Laterality: N/A;    CATARACT EXTRACTION, BILATERAL Bilateral 1995    CORONARY ARTERY BYPASS GRAFT  1994    3 vessel    EXTRACORPOREAL SHOCK WAVE LITHOTRIPSY (ESWL) Bilateral 2004    TONSILLECTOMY                          PT Assessment/Plan       Row Name 02/20/24 1100          PT Assessment    Assessment Comments Pt reports his pain continues to improve gradually however he continues to have pain when donning pants. Continud manual therapy with good tolerance and initiated HS curl standing to address pain with AROM R knee flexion. Pt without reports of increased pain throughout session.  -RS        PT Plan    PT Plan Comments Update HEP, consider eccentric step down  -RS               User Key  (r) = Recorded By, (t) = Taken By, (c) = Cosigned By      Initials Name Provider Type    RS Susan Stover, PT Physical Therapist                       OP Exercises       Row Name 02/20/24 0900             Subjective    Subjective Comments Pt reports he feels some better but he continues to have pain when bending his knee to put on pants  -RS         Subjective Pain    Able to rate subjective pain? yes  -RS      Pre-Treatment Pain Level 5  -RS         Total Minutes    53602 - PT Therapeutic Exercise Minutes 30  -RS      37328 - PT Manual Therapy Minutes 10  -RS         Exercise 1    Exercise Name 1 Rec Bike  -RS      Time 1 5 min  -RS         Exercise 4    Exercise Name 4 Supine SLR   -RS      Cueing 4 Verbal  -RS      Sets 4 2  -RS      Reps 4 10  -RS      Additional Comments 2#  -RS         Exercise 5    Exercise Name 5 S/L clams, reverse clams  -RS      Cueing 5 Verbal  -RS      Reps 5 15ea  -RS      Time 5 GTB, no resistance reverse pos.  -RS         Exercise 6    Exercise Name 6 STS  -RS      Cueing 6 Verbal;Demo  -RS      Sets 6 2  -RS      Reps 6 10  -RS      Time 6 clinic chair with foam  -RS      Additional Comments R LE back  -RS         Exercise 7    Exercise Name 7 Step up w/ knee drive  -RS      Cueing 7 Verbal  -RS      Reps 7 15B  -RS      Time 7 6in  -RS      Additional Comments fwd/lat  -RS         Exercise 10    Exercise Name 10 Hip flexor stretch  -RS      Cueing 10 Verbal  -RS      Reps 10 3B  -RS      Time 10 20s  -RS      Additional Comments at steps  -RS         Exercise 11    Exercise Name 11 bridge + hip add  -RS      Cueing 11 Verbal;Demo  -RS      Sets 11 2  -RS      Reps 11 10  -RS      Time 11 pink ball  -RS         Exercise 12    Exercise Name 12 standing HS curl  -RS      Cueing 12 Verbal;Demo  -RS      Reps 12 2  -RS      Time 12 10  -RS      Additional Comments 2#  -RS         Exercise 13    Exercise Name 13 Partial lunge star  -RS      Cueing 13 --  -RS      Reps 13 --  -RS      Time 13 --  -RS         Exercise 14    Exercise Name 14 Resisted side stepping, monster walk  -RS      Cueing 14 Verbal  -RS      Sets 14 3 laps ea  -RS      Time 14 RTB  -RS         Exercise 15    Exercise Name 15 3D hip on foam  -RS      Cueing 15 --  -RS      Sets 15 --  -RS      Reps 15 --  -RS      Additional Comments next time  -RS                User Key  (r) = Recorded By, (t) = Taken By, (c) = Cosigned By      Initials Name Provider Type    RS Susan Stover, PT Physical Therapist                             Manual Rx (last 36 hours)       Manual Treatments       Row Name 02/20/24 0900             Total Minutes    17991 - PT Manual Therapy Minutes 10  -RS         Manual Rx 1     Manual Rx 1 Location R hip distraction @ 90 degree flexion using mob belt  -RS      Manual Rx 1 Type With IR/ER using contract-relax for deeper ROM  -RS                User Key  (r) = Recorded By, (t) = Taken By, (c) = Cosigned By      Initials Name Provider Type    RS Susan Stover, PT Physical Therapist                     PT OP Goals       Row Name 02/20/24 1100          PT Short Term Goals    STG Date to Achieve 03/02/24  -RS     STG 1 Pt will be independent with initial HEP to improve strength/ROM and decrease pain.  -RS     STG 1 Progress Ongoing  -RS     STG 2 Patient will report decreased difficulty with donning socks via hip flexion and external rotation to demonstrate reduction in symptoms.  -RS     STG 2 Progress Ongoing  -RS     STG 3 Pt will improve B hip strength to at least 4+/5.  -RS     STG 3 Progress Ongoing  -RS        Long Term Goals    LTG Date to Achieve 04/01/24  -RS     LTG 1 Pt will be independent with advanced HEP to maintain strength/ROM and decrease pain.  -RS     LTG 1 Progress Ongoing  -RS     LTG 2 Patient will demonstrate R hip IR AROM >/=25 degrees without pain.  -RS     LTG 2 Progress Ongoing  -RS     LTG 3 Patient will subjectively report 70% reduction in overall symptoms to demonstrate perceived improvement in overall condition.  -RS     LTG 3 Progress Ongoing  -RS               User Key  (r) = Recorded By, (t) = Taken By, (c) = Cosigned By      Initials Name Provider Type    Susan Dickerson PT Physical Therapist                    Therapy Education  Given: HEP  Program: Reinforced  How Provided: Verbal, Demonstration  Provided to: Patient  Level of Understanding: Verbalized, Demonstrated              Time Calculation:   Start Time: 1047  Stop Time: 1128  Time Calculation (min): 41 min  Timed Charges  10452 - PT Therapeutic Exercise Minutes: 30  79689 - PT Manual Therapy Minutes: 10  Total Minutes  Timed Charges Total Minutes: 40   Total Minutes: 40  Therapy Charges  for Today       Code Description Service Date Service Provider Modifiers Qty    78473823749 HC PT THER PROC EA 15 MIN 2/20/2024 Susan Stover, PT GP 2    37011013444 HC PT MANUAL THERAPY EA 15 MIN 2/20/2024 Susan Stover, PT GP 1                      Suasn Stover, FANG  2/20/2024

## 2024-02-23 ENCOUNTER — HOSPITAL ENCOUNTER (OUTPATIENT)
Dept: PHYSICAL THERAPY | Facility: HOSPITAL | Age: 79
Setting detail: THERAPIES SERIES
Discharge: HOME | End: 2024-02-23
Payer: MEDICARE

## 2024-02-23 DIAGNOSIS — M79.604 PAIN IN RIGHT LEG: Primary | ICD-10-CM

## 2024-02-23 DIAGNOSIS — M25.561 ACUTE PAIN OF RIGHT KNEE: ICD-10-CM

## 2024-02-23 DIAGNOSIS — M25.851 FEMOROACETABULAR IMPINGEMENT OF RIGHT HIP: ICD-10-CM

## 2024-02-23 PROCEDURE — 97110 THERAPEUTIC EXERCISES: CPT

## 2024-02-23 NOTE — THERAPY TREATMENT NOTE
Outpatient Physical Therapy Ortho Treatment Note  [facility]     Patient Name: Kerrie Tyler  : 1945  MRN: [id number]  Today's Date: 2024      Visit Date: 2024    Visit Dx:    ICD-10-CM ICD-9-CM   1. Pain in right leg  M79.604 729.5   2. Femoroacetabular impingement of right hip  M25.851 719.95   3. 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 stable angina pectoris    S/P CABG x 3    Benign prostatic hyperplasia with urinary hesitancy    Vitamin D deficiency    Presbycusis of both ears    Diabetic retinopathy associated with type 2 diabetes mellitus    Acute chest pain    Status post insertion of drug eluting coronary artery stent    Chest pain        Past Medical History:   Diagnosis Date    Kidney stone     Myocardial infarction         Past Surgical History:   Procedure Laterality Date    CARDIAC CATHETERIZATION N/A 2022    Procedure: Left Heart Cath  FEMORAL ACCESS;  Surgeon: Jam Gavin MD;  Location: [facility] CATH INVASIVE LOCATION;  Service: Cardiovascular;  Laterality: N/A;  Use femoral access- patient has bilateral CAMERON's with previous CABG    CARDIAC CATHETERIZATION N/A 2022    Procedure: Left ventriculography;  Surgeon: Jam Gavin MD;  Location: [facility] CATH INVASIVE LOCATION;  Service: Cardiovascular;  Laterality: N/A;    CARDIAC CATHETERIZATION N/A 2022    Procedure: Native mammary injection;  Surgeon: Jam Gavin MD;  Location: [facility] CATH INVASIVE LOCATION;  Service: Cardiovascular;  Laterality: N/A;    CARDIAC CATHETERIZATION  2022    Procedure: RESTING FULL CYCLE RATIO;  Surgeon: Jam Gavin MD;  Location: [facility] CATH INVASIVE LOCATION;  Service: Cardiovascular;;    CARDIAC CATHETERIZATION N/A  8/24/2022    Procedure: Stent LIZBET bypass graft;  Surgeon: Jam Gavin MD;  Location:  [facility] CATH INVASIVE LOCATION;  Service: Cardiovascular;  Laterality: N/A;    CARDIAC CATHETERIZATION N/A 8/24/2022    Procedure: Coronary angiography;  Surgeon: Jam Gavin MD;  Location:  [facility] CATH INVASIVE LOCATION;  Service: Cardiovascular;  Laterality: N/A;    CARDIAC CATHETERIZATION N/A 8/24/2022    Procedure: Percutaneous Coronary Intervention;  Surgeon: Jam Gavin MD;  Location:  [facility] CATH INVASIVE LOCATION;  Service: Cardiovascular;  Laterality: N/A;    CATARACT EXTRACTION, BILATERAL Bilateral 1995    CORONARY ARTERY BYPASS GRAFT  1994    3 vessel    EXTRACORPOREAL SHOCK WAVE LITHOTRIPSY (ESWL) Bilateral 2004    TONSILLECTOMY                          PT Assessment/Plan       Row Name 02/23/24 1145          PT Assessment    Assessment Comments 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 Added standing D1 LE flexion this date to target hip flexion + ER with good tolerance, plan to add resistance next visit. Ended session with no complaints of pain. He remains a good candidate for skilled therapy at this time.  -ZB        PT Plan    PT Plan Comments Needs HEP update, consider eccentric step down  -ZB               User Key  (r) = Recorded By, (t) = Taken By, (c) = Cosigned By      Initials Name Provider Type    ZB Chris Olea, PT Physical Therapist                       OP Exercises       Row Name 02/23/24 1000             Subjective    Subjective Comments Reports improvement but continued pain with donning pants.  -ZB         Total Minutes    59863 - PT Therapeutic Exercise Minutes 40  -ZB         Exercise 1    Exercise Name 1 Rec Bike  -ZB      Time 1 5 min  -ZB         Exercise 4    Exercise Name 4 Supine SLR  -ZB      Cueing 4 Verbal  -ZB      Sets 4 2  -ZB      Reps 4 10  -ZB      Additional  Comments 2#  -ZB         Exercise 5    Exercise Name 5 S/L clams, reverse clams  -ZB      Cueing 5 Verbal  -ZB      Reps 5 15ea  -ZB      Time 5 GTB, no resistance reverse pos.  -ZB         Exercise 6    Exercise Name 6 STS  -ZB      Cueing 6 Verbal;Demo  -ZB      Sets 6 2  -ZB      Reps 6 10  -ZB      Time 6 clinic chair with foam  -ZB      Additional Comments R LE back  -ZB         Exercise 7    Exercise Name 7 Step up w/ knee drive  -ZB      Cueing 7 Verbal  -ZB      Reps 7 15B  -ZB      Time 7 6in  -ZB      Additional Comments fwd/lat  -ZB         Exercise 10    Exercise Name 10 Hip flexor stretch  -ZB      Cueing 10 Verbal  -ZB      Reps 10 3B  -ZB      Time 10 20s  -ZB      Additional Comments at steps  -ZB         Exercise 11    Exercise Name 11 bridge + hip add  -ZB      Cueing 11 Verbal;Demo  -ZB      Sets 11 2  -ZB      Reps 11 10  -ZB      Time 11 pink ball  -ZB         Exercise 12    Exercise Name 12 standing HS curl  -ZB      Cueing 12 Verbal;Demo  -ZB      Reps 12 2  -ZB      Time 12 10  -ZB      Additional Comments 2#  -ZB         Exercise 13    Exercise Name 13 Partial lunge star  -ZB      Cueing 13 Verbal  -ZB      Reps 13 5B, ea direction  -ZB      Time 13 fwd/lat/retro  -ZB         Exercise 14    Exercise Name 14 Resisted side stepping, monster walk  -ZB      Cueing 14 Verbal  -ZB      Sets 14 3 laps ea  -ZB      Time 14 RTB  -ZB         Exercise 15    Exercise Name 15 3D hip on foam  -ZB      Cueing 15 Verbal  -ZB      Sets 15 2 B  -ZB      Reps 15 10  -ZB         Exercise 16    Exercise Name 16 Standing D1 LE flexion  -ZB      Cueing 16 Verbal;Demo  -ZB      Reps 16 15 L  -ZB      Additional Comments consider adding resistance next visit  -ZB                User Key  (r) = Recorded By, (t) = Taken By, (c) = Cosigned By      Initials Name Provider Type    ZB Chris Olea, PT Physical Therapist                                  PT OP Goals       Row Name 02/23/24 1100          PT Short Term Goals     STG Date to Achieve 03/02/24  -ZB     STG 1 Pt will be independent with initial HEP to improve strength/ROM and decrease pain.  -ZB     STG 1 Progress Met  -ZB     STG 2 Patient will report decreased difficulty with donning socks via hip flexion and external rotation to demonstrate reduction in symptoms.  -ZB     STG 2 Progress Ongoing  -ZB     STG 2 Progress Comments Continues to report pain and difficulty.  -ZB     STG 3 Pt will improve B hip strength to at least 4+/5.  -ZB     STG 3 Progress Ongoing  -ZB        Long Term Goals    LTG Date to Achieve 04/01/24  -ZB     LTG 1 Pt will be independent with advanced HEP to maintain strength/ROM and decrease pain.  -ZB     LTG 1 Progress Ongoing  -ZB     LTG 2 Patient will demonstrate R hip IR AROM >/=25 degrees without pain.  -ZB     LTG 2 Progress Ongoing  -ZB     LTG 3 Patient will subjectively report 70% reduction in overall symptoms to demonstrate perceived improvement in overall condition.  -ZB     LTG 3 Progress Ongoing  -ZB               User Key  (r) = Recorded By, (t) = Taken By, (c) = Cosigned By      Initials Name Provider Type    Chris Tobar, PT Physical Therapist                                   Time Calculation:   Start Time: 1000  Stop Time: 1040  Time Calculation (min): 40 min  Timed Charges  60237 - PT Therapeutic Exercise Minutes: 40  Total Minutes  Timed Charges Total Minutes: 40   Total Minutes: 40  Therapy Charges for Today       Code Description Service Date Service Provider Modifiers Qty    37319134363 HC PT THER PROC EA 15 MIN 2/23/2024 Chris Olea, PT GP 3                      Kaleb Olea PT  2/23/2024

## 2024-02-27 ENCOUNTER — HOSPITAL ENCOUNTER (OUTPATIENT)
Dept: PHYSICAL THERAPY | Facility: HOSPITAL | Age: 79
Setting detail: THERAPIES SERIES
Discharge: HOME | End: 2024-02-27
Payer: MEDICARE

## 2024-02-27 DIAGNOSIS — M25.851 FEMOROACETABULAR IMPINGEMENT OF RIGHT HIP: ICD-10-CM

## 2024-02-27 DIAGNOSIS — M79.604 PAIN IN RIGHT LEG: Primary | ICD-10-CM

## 2024-02-27 PROCEDURE — 97110 THERAPEUTIC EXERCISES: CPT

## 2024-02-27 PROCEDURE — 97530 THERAPEUTIC ACTIVITIES: CPT

## 2024-02-27 NOTE — THERAPY TREATMENT NOTE
Outpatient Physical Therapy Ortho Treatment Note  [facility]     Patient Name: Kerrie Tyler  : 1945  MRN: [id number]  Today's Date: 2024      Visit Date: 2024    Visit Dx:    ICD-10-CM ICD-9-CM   1. Pain in right leg  M79.604 729.5   2. 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 with stable angina pectoris    S/P CABG x 3    Benign prostatic hyperplasia with urinary hesitancy    Vitamin D deficiency    Presbycusis of both ears    Diabetic retinopathy associated with type 2 diabetes mellitus    Acute chest pain    Status post insertion of drug eluting coronary artery stent    Chest pain        Past Medical History:   Diagnosis Date    Kidney stone     Myocardial infarction         Past Surgical History:   Procedure Laterality Date    CARDIAC CATHETERIZATION N/A 2022    Procedure: Left Heart Cath  FEMORAL ACCESS;  Surgeon: Jam Gavin MD;  Location: [facility] CATH INVASIVE LOCATION;  Service: Cardiovascular;  Laterality: N/A;  Use femoral access- patient has bilateral CAMERON's with previous CABG    CARDIAC CATHETERIZATION N/A 2022    Procedure: Left ventriculography;  Surgeon: Jam Gavin MD;  Location: [facility] CATH INVASIVE LOCATION;  Service: Cardiovascular;  Laterality: N/A;    CARDIAC CATHETERIZATION N/A 2022    Procedure: Native mammary injection;  Surgeon: Jam Gavin MD;  Location: [facility] CATH INVASIVE LOCATION;  Service: Cardiovascular;  Laterality: N/A;    CARDIAC CATHETERIZATION  2022    Procedure: RESTING FULL CYCLE RATIO;  Surgeon: Jam Gavin MD;  Location: [facility] CATH INVASIVE LOCATION;  Service: Cardiovascular;;    CARDIAC CATHETERIZATION N/A 2022    Procedure: Stent LIZBET bypass graft;   Surgeon: Jam Gavin MD;  Location:  [facility] CATH INVASIVE LOCATION;  Service: Cardiovascular;  Laterality: N/A;    CARDIAC CATHETERIZATION N/A 8/24/2022    Procedure: Coronary angiography;  Surgeon: Jam Gavin MD;  Location:  [facility] CATH INVASIVE LOCATION;  Service: Cardiovascular;  Laterality: N/A;    CARDIAC CATHETERIZATION N/A 8/24/2022    Procedure: Percutaneous Coronary Intervention;  Surgeon: Jam Gvain MD;  Location:  [facility] CATH INVASIVE LOCATION;  Service: Cardiovascular;  Laterality: N/A;    CATARACT EXTRACTION, BILATERAL Bilateral 1995    CORONARY ARTERY BYPASS GRAFT  1994    3 vessel    EXTRACORPOREAL SHOCK WAVE LITHOTRIPSY (ESWL) Bilateral 2004    TONSILLECTOMY                          PT Assessment/Plan       Row Name 02/27/24 1100          PT Assessment    Assessment Comments Pt reports little to no change ein pain R hip with donning pants since last session. Discussed modification of donning pants in seated position rather than standing as this facilitated decreased  pain. Pt also reports  increased pain getting into the car, reviewed techniques to compensate, encouraged to sit first then move LE into car. Continued with current program with addition of resistance to hip flexion and hip 3 way and updated HEP. Pt reports understanding of HEP and POC.  -RS        PT Plan    PT Plan Comments Plan to DC to HEP next session  -RS               User Key  (r) = Recorded By, (t) = Taken By, (c) = Cosigned By      Initials Name Provider Type    RS Susan Stover, PT Physical Therapist                       OP Exercises       Row Name 02/27/24 1000             Subjective    Subjective Comments Pt reports he continues to have pain putting on paints  -RS         Total Minutes    24308 - PT Therapeutic Exercise Minutes 23  -RS      11059 - PT Therapeutic Activity Minutes 15  -RS         Exercise 1    Exercise Name 1 Rec Bike  -RS      Time 1 5 min  -RS         Exercise 4    Exercise Name 4 --  -RS       Cueing 4 --  -RS      Sets 4 --  -RS      Reps 4 --  -RS         Exercise 5    Exercise Name 5 --  -RS      Cueing 5 --  -RS      Reps 5 --  -RS      Time 5 --  -RS         Exercise 6    Exercise Name 6 STS  -RS      Cueing 6 Verbal;Demo  -RS      Sets 6 2  -RS      Reps 6 10  -RS      Time 6 clinic chair with foam  -RS      Additional Comments with shoulder press L2 med ball  -RS         Exercise 7    Exercise Name 7 Step up w/ knee drive  -RS      Cueing 7 Verbal  -RS      Reps 7 15B  -RS      Time 7 6in  -RS      Additional Comments fwd/lat  -RS         Exercise 9    Exercise Name 9 monster fwd/back  -RS      Cueing 9 Verbal;Demo  -RS      Reps 9 3 laps  -RS      Time 9 RTB  -RS         Exercise 10    Exercise Name 10 Hip flexor stretch  -RS      Cueing 10 Verbal  -RS      Reps 10 3B  -RS      Time 10 20s  -RS      Additional Comments at steps  -RS         Exercise 11    Exercise Name 11 bridge + hip add  -RS      Cueing 11 Verbal;Demo  -RS      Sets 11 2  -RS      Reps 11 10  -RS      Time 11 pink ball  -RS         Exercise 12    Exercise Name 12 standing HS curl  -RS      Cueing 12 Verbal;Demo  -RS      Reps 12 2  -RS      Time 12 10  -RS      Additional Comments 2#  -RS         Exercise 13    Exercise Name 13 Partial lunge star  -RS      Cueing 13 Verbal  -RS      Reps 13 5B, ea direction  -RS      Time 13 fwd/lat/retro  -RS         Exercise 14    Exercise Name 14 Resisted side stepping  -RS      Cueing 14 Verbal  -RS      Sets 14 3 laps ea  -RS      Time 14 RTB  -RS         Exercise 15    Exercise Name 15 3D hip on foam  -RS      Cueing 15 Verbal  -RS      Sets 15 2 B  -RS      Reps 15 10  -RS      Additional Comments RTB  -RS         Exercise 16    Exercise Name 16 Standing D1 LE flexion  -RS      Cueing 16 Verbal;Demo  -RS      Reps 16 15 L  -RS      Additional Comments RTB  -RS                User Key  (r) = Recorded By, (t) = Taken By, (c) = Cosigned By      Initials Name Provider Type    RS Ck  FANG Davis Physical Therapist                                  PT OP Goals       Row Name 02/27/24 1100          PT Short Term Goals    STG Date to Achieve 03/02/24  -RS     STG 1 Pt will be independent with initial HEP to improve strength/ROM and decrease pain.  -RS     STG 1 Progress Met  -RS     STG 2 Patient will report decreased difficulty with donning socks via hip flexion and external rotation to demonstrate reduction in symptoms.  -RS     STG 2 Progress Ongoing  -RS     STG 3 Pt will improve B hip strength to at least 4+/5.  -RS     STG 3 Progress Ongoing  -RS        Long Term Goals    LTG Date to Achieve 04/01/24  -RS     LTG 1 Pt will be independent with advanced HEP to maintain strength/ROM and decrease pain.  -RS     LTG 1 Progress Ongoing  -RS     LTG 2 Patient will demonstrate R hip IR AROM >/=25 degrees without pain.  -RS     LTG 2 Progress Ongoing  -RS     LTG 3 Patient will subjectively report 70% reduction in overall symptoms to demonstrate perceived improvement in overall condition.  -RS     LTG 3 Progress Ongoing  -RS               User Key  (r) = Recorded By, (t) = Taken By, (c) = Cosigned By      Initials Name Provider Type    RS Susan Stover, FANG Physical Therapist                    Therapy Education  Given: HEP  Program: Reinforced, Progressed  How Provided: Verbal, Demonstration, Written  Provided to: Patient  Level of Understanding: Verbalized, Demonstrated              Time Calculation:   Start Time: 1040  Stop Time: 1120  Time Calculation (min): 40 min  Timed Charges  00639 - PT Therapeutic Exercise Minutes: 23  02679 - PT Therapeutic Activity Minutes: 15  Total Minutes  Timed Charges Total Minutes: 38   Total Minutes: 38  Therapy Charges for Today       Code Description Service Date Service Provider Modifiers Qty    68578980515 HC PT THER PROC EA 15 MIN 2/27/2024 Susan Stover, PT GP 2    45092487982 HC PT THERAPEUTIC ACT EA 15 MIN 2/27/2024 Susan Stover, PT GP 1                       Susan Stover, PT  2/27/2024

## 2024-02-29 ENCOUNTER — LAB (OUTPATIENT)
Dept: LAB | Facility: HOSPITAL | Age: 79
End: 2024-02-29
Payer: MEDICARE

## 2024-02-29 ENCOUNTER — OFFICE VISIT (OUTPATIENT)
Age: 79
End: 2024-02-29
Payer: MEDICARE

## 2024-02-29 VITALS
BODY MASS INDEX: 24.11 KG/M2 | HEIGHT: 66 IN | DIASTOLIC BLOOD PRESSURE: 66 MMHG | WEIGHT: 150 LBS | HEART RATE: 70 BPM | SYSTOLIC BLOOD PRESSURE: 134 MMHG

## 2024-02-29 DIAGNOSIS — Z95.1 S/P CABG X 3: ICD-10-CM

## 2024-02-29 DIAGNOSIS — I25.708 CORONARY ARTERY DISEASE OF BYPASS GRAFT OF NATIVE HEART WITH STABLE ANGINA PECTORIS: ICD-10-CM

## 2024-02-29 DIAGNOSIS — I10 ESSENTIAL HYPERTENSION: ICD-10-CM

## 2024-02-29 DIAGNOSIS — I10 ESSENTIAL HYPERTENSION: Primary | ICD-10-CM

## 2024-02-29 LAB
ALBUMIN SERPL-MCNC: 4.3 G/DL (ref 3.5–5.2)
ALBUMIN/GLOB SERPL: 1.6 G/DL
ALP SERPL-CCNC: 62 U/L (ref 39–117)
ALT SERPL W P-5'-P-CCNC: 16 U/L (ref 1–41)
ANION GAP SERPL CALCULATED.3IONS-SCNC: 8.5 MMOL/L (ref 5–15)
ARTICHOKE IGE QN: 94 MG/DL (ref 0–100)
AST SERPL-CCNC: 18 U/L (ref 1–40)
BASOPHILS # BLD AUTO: 0.04 10*3/MM3 (ref 0–0.2)
BASOPHILS NFR BLD AUTO: 0.5 % (ref 0–1.5)
BILIRUB SERPL-MCNC: 0.3 MG/DL (ref 0–1.2)
BUN SERPL-MCNC: 15 MG/DL (ref 8–23)
BUN/CREAT SERPL: 15.6 (ref 7–25)
CALCIUM SPEC-SCNC: 10 MG/DL (ref 8.6–10.5)
CHLORIDE SERPL-SCNC: 103 MMOL/L (ref 98–107)
CK SERPL-CCNC: 47 U/L (ref 20–200)
CO2 SERPL-SCNC: 27.5 MMOL/L (ref 22–29)
CREAT SERPL-MCNC: 0.96 MG/DL (ref 0.76–1.27)
DEPRECATED RDW RBC AUTO: 40.7 FL (ref 37–54)
EGFRCR SERPLBLD CKD-EPI 2021: 80.4 ML/MIN/1.73
EOSINOPHIL # BLD AUTO: 0.1 10*3/MM3 (ref 0–0.4)
EOSINOPHIL NFR BLD AUTO: 1.4 % (ref 0.3–6.2)
ERYTHROCYTE [DISTWIDTH] IN BLOOD BY AUTOMATED COUNT: 13 % (ref 12.3–15.4)
GLOBULIN UR ELPH-MCNC: 2.7 GM/DL
GLUCOSE SERPL-MCNC: 144 MG/DL (ref 65–99)
HCT VFR BLD AUTO: 41.2 % (ref 37.5–51)
HGB BLD-MCNC: 13.6 G/DL (ref 13–17.7)
IMM GRANULOCYTES # BLD AUTO: 0.03 10*3/MM3 (ref 0–0.05)
IMM GRANULOCYTES NFR BLD AUTO: 0.4 % (ref 0–0.5)
LYMPHOCYTES # BLD AUTO: 1.78 10*3/MM3 (ref 0.7–3.1)
LYMPHOCYTES NFR BLD AUTO: 24.4 % (ref 19.6–45.3)
MCH RBC QN AUTO: 28.5 PG (ref 26.6–33)
MCHC RBC AUTO-ENTMCNC: 33 G/DL (ref 31.5–35.7)
MCV RBC AUTO: 86.4 FL (ref 79–97)
MONOCYTES # BLD AUTO: 0.42 10*3/MM3 (ref 0.1–0.9)
MONOCYTES NFR BLD AUTO: 5.7 % (ref 5–12)
NEUTROPHILS NFR BLD AUTO: 4.94 10*3/MM3 (ref 1.7–7)
NEUTROPHILS NFR BLD AUTO: 67.6 % (ref 42.7–76)
NRBC BLD AUTO-RTO: 0 /100 WBC (ref 0–0.2)
PLATELET # BLD AUTO: 163 10*3/MM3 (ref 140–450)
PMV BLD AUTO: 10.5 FL (ref 6–12)
POTASSIUM SERPL-SCNC: 4.2 MMOL/L (ref 3.5–5.2)
PROT SERPL-MCNC: 7 G/DL (ref 6–8.5)
RBC # BLD AUTO: 4.77 10*6/MM3 (ref 4.14–5.8)
SODIUM SERPL-SCNC: 139 MMOL/L (ref 136–145)
TSH SERPL DL<=0.05 MIU/L-ACNC: 0.96 UIU/ML (ref 0.27–4.2)
WBC NRBC COR # BLD AUTO: 7.31 10*3/MM3 (ref 3.4–10.8)

## 2024-02-29 PROCEDURE — 36415 COLL VENOUS BLD VENIPUNCTURE: CPT

## 2024-02-29 PROCEDURE — 84443 ASSAY THYROID STIM HORMONE: CPT

## 2024-02-29 PROCEDURE — 3075F SYST BP GE 130 - 139MM HG: CPT | Performed by: INTERNAL MEDICINE

## 2024-02-29 PROCEDURE — 83721 ASSAY OF BLOOD LIPOPROTEIN: CPT

## 2024-02-29 PROCEDURE — 99214 OFFICE O/P EST MOD 30 MIN: CPT | Performed by: INTERNAL MEDICINE

## 2024-02-29 PROCEDURE — 85025 COMPLETE CBC W/AUTO DIFF WBC: CPT

## 2024-02-29 PROCEDURE — 82550 ASSAY OF CK (CPK): CPT

## 2024-02-29 PROCEDURE — 80053 COMPREHEN METABOLIC PANEL: CPT

## 2024-02-29 PROCEDURE — 3078F DIAST BP <80 MM HG: CPT | Performed by: INTERNAL MEDICINE

## 2024-02-29 PROCEDURE — 93000 ELECTROCARDIOGRAM COMPLETE: CPT | Performed by: INTERNAL MEDICINE

## 2024-02-29 NOTE — PROGRESS NOTES
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   He moved here from [place] a couple of years prior and has not followed with a cardiologist.  Echocardiogram demonstrated normal LV function along with distal anteroapical dyskinesis and septal dyskinesis.    Patient was noted to have a discrete 80% stenosis in the AJAY to RCA anastomosis and underwent PCI to that vessel by Dr. Kenji Gavin.  The LAD fills via patent LIMA.  The ramus was occluded and the circumflex coronary artery had no flow-limiting disease.  He was subsequently was discharged home on aspirin and Plavix.  He states that he still has chest discomfort that comes on around once a week to once every 2 weeks and he takes a sublingual nitroglycerin with improvement in his pain.  He has been declining per his report and is decreasing when it comes to his strength and mobility.  He is also had a couple of falls.      Review of Systems   Constitutional: Negative for fever and malaise/fatigue.   HENT:  Negative for nosebleeds and sore throat.    Eyes:  Negative for blurred vision and double vision.   Cardiovascular:  Negative for chest pain, claudication, palpitations and syncope.   Respiratory:  Negative for cough, shortness of breath and snoring.    Endocrine: Negative for cold intolerance, heat intolerance and polydipsia.   Skin:  Negative for itching, poor wound healing and rash.   Musculoskeletal:  Negative for joint pain, joint swelling, muscle weakness and myalgias.   Gastrointestinal:   Negative for abdominal pain, melena, nausea and vomiting.   Neurological:  Negative for light-headedness, loss of balance, seizures, vertigo and weakness.   Psychiatric/Behavioral:  Negative for altered mental status and depression.           Past Medical History:   Diagnosis Date    Coronary artery disease 1994    CABAG    Diabetes mellitus 2012    Type2. Controlled    Kidney stone     Myocardial infarction        The following portions of the patient's history were reviewed and updated as appropriate: Social history , Family history and Surgical history     Current Outpatient Medications on File Prior to Visit   Medication Sig Dispense Refill    aspirin 81 MG EC tablet Take 1 tablet by mouth Daily. 30 tablet 11    bacitracin 500 UNIT/GM ointment Apply 1 application topically to the appropriate area as directed 2 (Two) Times a Day. 14 g 0    Cholecalciferol (Vitamin D) 50 MCG (2000 UT) capsule Take 1 capsule by mouth Daily. 90 capsule 3    clopidogrel (PLAVIX) 75 MG tablet Take 1 tablet by mouth Daily. 30 tablet 11    glucose blood (Accu-Chek Guide) test strip 1 each by Other route 3 (Three) Times a Day. Use as instructed 300 each 2    glucose monitor monitoring kit 1 each Daily. 1 each 1    HYDROcodone-acetaminophen (NORCO) 5-325 MG per tablet Take 0.5 tablets by mouth Every 6 (Six) Hours As Needed for Severe Pain. 4 tablet 0    metFORMIN ER (GLUCOPHAGE-XR) 500 MG 24 hr tablet Take 1 tablet by mouth 2 (Two) Times a Day. 180 tablet 1    metoprolol tartrate (LOPRESSOR) 25 MG tablet TAKE 1/2 OF A TABLET BY MOUTH TWICE DAILY 180 tablet 1    nitroglycerin (Nitrostat) 0.4 MG SL tablet Place 1 tablet under the tongue Every 5 (Five) Minutes As Needed for Chest Pain. Take no more than 3 doses in 15 minutes. 25 tablet 12    Omega-3 Fatty Acids (fish oil) 1000 MG capsule capsule Take 1 capsule by mouth Daily With Breakfast. 90 capsule 3    rosuvastatin (CRESTOR) 10 MG tablet Take 1 tablet by mouth Daily. 90 tablet 3     "tamsulosin (FLOMAX) 0.4 MG capsule 24 hr capsule TAKE 1 CAPSULE BY MOUTH DAILY 90 capsule 0    trandolapril (MAVIK) 4 MG tablet Take 1 tablet by mouth 2 (Two) Times a Day. 180 tablet 1     No current facility-administered medications on file prior to visit.       Allergies   Allergen Reactions    Sulfa Antibiotics Rash, Itching, Unknown - Low Severity and Other (See Comments)    Trimethoprim Rash       Vitals:    02/29/24 1118   BP: 134/66   Pulse: 70   Weight: 68 kg (150 lb)   Height: 167.6 cm (66\")     Body mass index is 24.21 kg/m².   Constitutional:       Appearance: Well-developed.   Eyes:      General: No scleral icterus.     Conjunctiva/sclera: Conjunctivae normal.   HENT:      Head: Normocephalic and atraumatic.   Neck:      Thyroid: No thyromegaly.      Vascular: Normal carotid pulses. No carotid bruit, hepatojugular reflux or JVD.      Trachea: No tracheal deviation.   Pulmonary:      Effort: No respiratory distress.      Breath sounds: Normal breath sounds. No decreased breath sounds. No wheezing. No rhonchi. No rales.   Chest:      Chest wall: Not tender to palpatation.   Cardiovascular:      Normal rate. Regular rhythm.      No gallop.    Pulses:     Carotid: 2+ bilaterally.     Radial: 2+ bilaterally.     Femoral: 2+ bilaterally.     Dorsalis pedis: 2+ bilaterally.     Posterior tibial: 2+ bilaterally.  Edema:     Peripheral edema absent.   Abdominal:      General: Bowel sounds are normal. There is no distension.      Palpations: Abdomen is soft.      Tenderness: There is no abdominal tenderness.   Musculoskeletal:         General: No deformity.      Cervical back: Normal range of motion and neck supple. Skin:     Findings: No erythema or rash.      Comments: Sternotomy   Neurological:      Mental Status: Alert and oriented to person, place, and time.      Sensory: No sensory deficit.   Psychiatric:         Behavior: Behavior normal.            Lab Results   Component Value Date    WBC 6.36 08/23/2023 "    HGB 13.3 08/23/2023    HCT 40.6 08/23/2023    MCV 83.9 08/23/2023     08/23/2023       Lab Results   Component Value Date    GLUCOSE 161 (H) 08/23/2023    BUN 13 08/23/2023    CREATININE 0.94 08/23/2023    EGFRIFNONA 83 09/14/2021    EGFRIFAFRI 96 09/14/2021    BCR 13.8 08/23/2023    K 4.4 08/23/2023    CO2 24.0 08/23/2023    CALCIUM 10.2 08/23/2023    PROTENTOTREF 7.5 09/14/2021    ALBUMIN 4.5 08/23/2023    LABIL2 1.7 09/14/2021    AST 16 08/23/2023    ALT 17 08/23/2023       Lab Results   Component Value Date    GLUCOSE 161 (H) 08/23/2023    CALCIUM 10.2 08/23/2023     08/23/2023    K 4.4 08/23/2023    CO2 24.0 08/23/2023     08/23/2023    BUN 13 08/23/2023    CREATININE 0.94 08/23/2023    EGFRIFAFRI 96 09/14/2021    EGFRIFNONA 83 09/14/2021    BCR 13.8 08/23/2023    ANIONGAP 13.0 08/23/2023       Lab Results   Component Value Date    CHOL 115 08/25/2022    CHLPL 161 09/14/2021    TRIG 50 08/25/2022    HDL 40 08/25/2022    LDL 63 08/25/2022         ECG 12 Lead    Date/Time: 2/29/2024 12:02 PM  Performed by: Tony Littlejohn MD    Authorized by: Tony Littlejohn MD  Comparison: compared with previous ECG from 7/6/2023  Similar to previous ECG  Rhythm: sinus rhythm  Rate: normal  QRS axis: left  Comments: Abnormal T waves anterior leads.             8/24/22  CORONARY ANGIOGRAPHY:  Left main: 60% distal left main 60%  Left anterior descending: The percent occluded at the origin  Ramus intermedius: 70 to 80% origin and then 80% mid where it looks like a graft had tied into but now is occluded  Circumflex: Large vessel but it mainly is in the AV groove there are 2 small OM's that are free of disease  RCA: Is a dominant vessel.  100% occluded in the midportion     LIMA to LAD: widely patent and normal.  Widely patent and looks good the distal LAD is also good     AJAY to RCA the graft is widely patent but at the insertion site is a 70 to 80% lesion the distal RCA looks good.    Patient  underwent 3 drug-eluting stents placed to Dr. Kenji Gavin      Results for orders placed during the hospital encounter of 08/23/22    Adult Transthoracic Echo Complete W/ Cont if Necessary Per Protocol    Interpretation Summary  · The study is technically difficult for diagnosis.  · Estimated right ventricular systolic pressure from tricuspid regurgitation is normal (<35 mmHg). Calculated right ventricular systolic pressure from tricuspid regurgitation is 21.9 mmHg.  · Estimated left ventricular EF = 56% Left ventricular systolic function is normal.  · Left ventricular diastolic function was normal.  · The following left ventricular wall segments are hypokinetic: apical septal, basal inferoseptal, mid inferoseptal, mid anteroseptal and basal inferoseptal.      DISCUSSION/SUMMARY  78-year-old male with a medical history of coronary artery disease with prior CABG including a LIMA to LAD and AJAY to RCA, preserved ejection fraction, hyperlipidemia, hypertension and diabetes mellitus who presented with chest discomfort consistent with unstable angina in August 2022.  He underwent PCI to the mid RCA anastomosis distally with 3 overlapping drug-eluting stents.  He tolerated this well and was subsequently discharged on dual antiplatelet therapy.  His main complaints as of late have been decreased mobility, strength, and occasional falls resulting in orthopedic fractures.  He has not had recent lab work and I suggested we start with that.  I have encouraged him to call me if he is angina increases in frequency.  At this time he does not want to start isosorbide mononitrate.  If he does have increased angina I would consider perfusion stress test at that time    1.  Coronary artery disease with prior CABG and PCI of the anastomosis of the LIMA to LAD in August 2022.  Patient states that he has angina every week or 2 that it requires 1 sublingual nitroglycerin.  He does not want to start isosorbide mononitrate.  -Continue  aspirin lifelong   -Plan on continuing Crestor therapy.  20 mg p.o. nightly.  Last LDL was 63.  -Continue metoprolol tartrate 12.5 mg p.o. every 12 hours.   -Will call me if his angina increases in frequency and at that time I would consider perfusion stress test    2.  Hyperlipidemia: Continue Crestor 20 mg p.o. nightly.  Tolerating well.  Patient has mild myalgias that are tolerable.  No elevation in transaminases

## 2024-02-29 NOTE — PROGRESS NOTES
2/29/2024    To whom it may concern I follow Dr. Kerrie Tyler in cardiology clinic in [facility].  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  He has weekly chest discomfort requiring nitroglycerin and decreased strength and mobility.  His functional status is significantly decreased with his advanced age.    With his advanced age and medical status I would support any efforts to allow for family visitation.    Sincerely,    Paco Littlejohn, [facility], [facility]

## 2024-03-01 ENCOUNTER — HOSPITAL ENCOUNTER (OUTPATIENT)
Dept: PHYSICAL THERAPY | Facility: HOSPITAL | Age: 79
Setting detail: THERAPIES SERIES
Discharge: HOME | End: 2024-03-01
Payer: MEDICARE

## 2024-03-01 DIAGNOSIS — M79.604 PAIN IN RIGHT LEG: Primary | ICD-10-CM

## 2024-03-01 DIAGNOSIS — M25.851 FEMOROACETABULAR IMPINGEMENT OF RIGHT HIP: ICD-10-CM

## 2024-03-01 PROCEDURE — 97110 THERAPEUTIC EXERCISES: CPT

## 2024-03-01 NOTE — THERAPY DISCHARGE NOTE
Outpatient Physical Therapy Ortho Progress Note/Discharge Summary  [facility]     Patient Name: Kerrie Tyler  : 1945  MRN: [id number]  Today's Date: 3/1/2024      Visit Date: 2024    Visit Dx:    ICD-10-CM ICD-9-CM   1. Pain in right leg  M79.604 729.5   2. 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 with stable angina pectoris    S/P CABG x 3    Benign prostatic hyperplasia with urinary hesitancy    Vitamin D deficiency    Presbycusis of both ears    Diabetic retinopathy associated with type 2 diabetes mellitus    Acute chest pain    Status post insertion of drug eluting coronary artery stent    Chest pain        Past Medical History:   Diagnosis Date    Coronary artery disease     CABAG    Diabetes mellitus 2012    Type2. Controlled    Kidney stone     Myocardial infarction         Past Surgical History:   Procedure Laterality Date    CARDIAC CATHETERIZATION N/A 2022    Procedure: Left Heart Cath  FEMORAL ACCESS;  Surgeon: Jam Gavin MD;  Location: [facility] CATH INVASIVE LOCATION;  Service: Cardiovascular;  Laterality: N/A;  Use femoral access- patient has bilateral CAMERON's with previous CABG    CARDIAC CATHETERIZATION N/A 2022    Procedure: Left ventriculography;  Surgeon: Jam Gavin MD;  Location: [facility] CATH INVASIVE LOCATION;  Service: Cardiovascular;  Laterality: N/A;    CARDIAC CATHETERIZATION N/A 2022    Procedure: Native mammary injection;  Surgeon: Jam Gavin MD;  Location: [facility] CATH INVASIVE LOCATION;  Service: Cardiovascular;  Laterality: N/A;    CARDIAC CATHETERIZATION  2022    Procedure: RESTING FULL CYCLE RATIO;  Surgeon: Jam Gavin MD;  Location: [facility] CATH INVASIVE  LOCATION;  Service: Cardiovascular;;    CARDIAC CATHETERIZATION N/A 08/24/2022    Procedure: Stent LIZBET bypass graft;  Surgeon: Jam Gavin MD;  Location:  [facility] CATH INVASIVE LOCATION;  Service: Cardiovascular;  Laterality: N/A;    CARDIAC CATHETERIZATION N/A 08/24/2022    Procedure: Coronary angiography;  Surgeon: Jam Gavin MD;  Location:  [facility] CATH INVASIVE LOCATION;  Service: Cardiovascular;  Laterality: N/A;    CARDIAC CATHETERIZATION N/A 08/24/2022    Procedure: Percutaneous Coronary Intervention;  Surgeon: Jam Gavin MD;  Location:  [facility] CATH INVASIVE LOCATION;  Service: Cardiovascular;  Laterality: N/A;    CATARACT EXTRACTION, BILATERAL Bilateral 1995    CORONARY ARTERY BYPASS GRAFT  1994    3 vessel    CORONARY STENT PLACEMENT  August 2022    EXTRACORPOREAL SHOCK WAVE LITHOTRIPSY (ESWL) Bilateral 2004    TONSILLECTOMY          PT Ortho       Row Name 03/01/24 1000       Right Lower Ext    Rt Hip Internal Rotation AROM 30  -JS       MMT Right Lower Ext    Rt Hip Flexion MMT, Gross Movement (4+/5) good plus  -JS    Rt Hip Extension MMT, Gross Movement (4/5) good  -JS    Rt Hip ABduction MMT, Gross Movement (4+/5) good plus  -JS    Rt Hip Internal (Medial) Rotation MMT, Gross Movement (4/5) good  -JS    Rt Hip External (Lateral) Rotation MMT, Gross Movement (4+/5) good plus  -JS       MMT Left Lower Ext    Lt Hip Flexion MMT, Gross Movement (4+/5) good plus  -JS    Lt Hip Extension MMT, Gross Movement (4/5) good  -JS    Lt Hip ABduction MMT, Gross Movement (4+/5) good plus  -JS    Lt Hip Internal (Medial) Rotation MMT, Gross Movement (4/5) good  -JS    Lt Hip External (Lateral) Rotation MMT, Gross Movement (4+/5) good plus  -JS              User Key  (r) = Recorded By, (t) = Taken By, (c) = Cosigned By      Initials Name Provider Type    Isabelle Hannah, PT Physical Therapist                                 PT Assessment/Plan       Row Name 03/01/24 1000          PT Assessment     Assessment Comments Kerrie Tyler was seen for 9 physical therapy sessions for R hip/knee pain. Patient reports overall 70% improvement since the start of PT. He now expresses greater ease when donning socks/shoes, walking, rising from a chair and when navigating stairs. He does report ongoing discomfort at end range R hip/knee flexion. Throughout his POC, treatment included therapeutic exercise, therapeutic activity, and patient education with home exercise program . Progress to physical therapy goals was good. Pt met 2/3 STG with partially meeting B hip strength goal and 3/3 LTG. He also demonstrates improvement in B hip strength and R hip ROM compared to initial evaluation (see ortho tab). Time spent discussing advanced HEP and appropriate progressions/modifications to make based on response following discharge and optimal frequency/duration to complete HEP over next several weeks-months. Patient verbalized understanding. He was discharged to an independent HEP and provided patient education to self-manage condition.  -JS        PT Plan    PT Plan Comments discharged  -JS               User Key  (r) = Recorded By, (t) = Taken By, (c) = Cosigned By      Initials Name Provider Type    Isabelle Hannah, PT Physical Therapist                         OP Exercises       Row Name 03/01/24 1000             Subjective    Subjective Comments I am doing good. A little tightness in the knee  -JS         Total Minutes    00410 - PT Therapeutic Exercise Minutes 25  -JS         Exercise 1    Exercise Name 1 Rec Bike  -JS      Time 1 5 min  -JS         Exercise 8    Exercise Name 8 Time spent filling out survey, taking ROM/MMT, discussing goals/POC and flinalizing HEP  -JS      Time 8 15 mins  -JS         Exercise 16    Exercise Name 16 Standing D1 LE flexion  -JS      Cueing 16 Verbal;Demo  -JS      Reps 16 15 L  -JS                User Key  (r) = Recorded By, (t) = Taken By, (c) = Cosigned By      Initials Name Provider  Type    Isabelle Hannah PT Physical Therapist                                    PT OP Goals       Row Name 03/01/24 1000          PT Short Term Goals    STG Date to Achieve 03/02/24  -JS     STG 1 Pt will be independent with initial HEP to improve strength/ROM and decrease pain.  -JS     STG 1 Progress Met  -JS     STG 2 Patient will report decreased difficulty with donning socks via hip flexion and external rotation to demonstrate reduction in symptoms.  -JS     STG 2 Progress Met  -JS     STG 3 Pt will improve B hip strength to at least 4+/5.  -JS     STG 3 Progress Partially Met  -JS     STG 3 Progress Comments see ortho  -JS        Long Term Goals    LTG Date to Achieve 04/01/24  -JS     LTG 1 Pt will be independent with advanced HEP to maintain strength/ROM and decrease pain.  -JS     LTG 1 Progress Met  -JS     LTG 2 Patient will demonstrate R hip IR AROM >/=25 degrees without pain.  -JS     LTG 2 Progress Met  -JS     LTG 2 Progress Comments 30 deg  -JS     LTG 3 Patient will subjectively report 70% reduction in overall symptoms to demonstrate perceived improvement in overall condition.  -JS     LTG 3 Progress Met  -JS     LTG 3 Progress Comments 70%  -JS               User Key  (r) = Recorded By, (t) = Taken By, (c) = Cosigned By      Initials Name Provider Type    Isabelle Hannah PT Physical Therapist                    Therapy Education  Education Details: finalized HEP  Given: HEP, Symptoms/condition management, Pain management, Posture/body mechanics  Program: Reinforced, Progressed  How Provided: Verbal, Demonstration, Written  Provided to: Patient  Level of Understanding: Verbalized, Demonstrated              Time Calculation:   Start Time: 1045  Stop Time: 1110  Time Calculation (min): 25 min  Timed Charges  16975 - PT Therapeutic Exercise Minutes: 25  Total Minutes  Timed Charges Total Minutes: 25   Total Minutes: 25  Therapy Charges for Today       Code Description Service Date Service  Provider Modifiers Qty    55037299339 HC PT THER PROC EA 15 MIN 3/1/2024 Isabelle Tineo, PT GP 2                         Isabelle Tineo, PT  3/1/2024

## 2024-03-12 DIAGNOSIS — R39.11 BENIGN PROSTATIC HYPERPLASIA WITH URINARY HESITANCY: ICD-10-CM

## 2024-03-12 DIAGNOSIS — N40.1 BENIGN PROSTATIC HYPERPLASIA WITH URINARY HESITANCY: ICD-10-CM

## 2024-03-12 RX ORDER — TAMSULOSIN HYDROCHLORIDE 0.4 MG/1
CAPSULE ORAL
Qty: 90 CAPSULE | OUTPATIENT
Start: 2024-03-12

## 2024-03-12 RX ORDER — TAMSULOSIN HYDROCHLORIDE 0.4 MG/1
CAPSULE ORAL
Qty: 30 CAPSULE | Refills: 0 | Status: SHIPPED | OUTPATIENT
Start: 2024-03-12

## 2024-04-08 DIAGNOSIS — R39.11 BENIGN PROSTATIC HYPERPLASIA WITH URINARY HESITANCY: ICD-10-CM

## 2024-04-08 DIAGNOSIS — N40.1 BENIGN PROSTATIC HYPERPLASIA WITH URINARY HESITANCY: ICD-10-CM

## 2024-04-08 RX ORDER — TAMSULOSIN HYDROCHLORIDE 0.4 MG/1
1 CAPSULE ORAL DAILY
Qty: 90 CAPSULE | OUTPATIENT
Start: 2024-04-08

## 2024-04-08 RX ORDER — TAMSULOSIN HYDROCHLORIDE 0.4 MG/1
1 CAPSULE ORAL DAILY
Qty: 30 CAPSULE | Refills: 0 | Status: SHIPPED | OUTPATIENT
Start: 2024-04-08

## 2024-04-11 DIAGNOSIS — N40.1 BENIGN PROSTATIC HYPERPLASIA WITH URINARY HESITANCY: ICD-10-CM

## 2024-04-11 DIAGNOSIS — R39.11 BENIGN PROSTATIC HYPERPLASIA WITH URINARY HESITANCY: ICD-10-CM

## 2024-04-11 RX ORDER — TAMSULOSIN HYDROCHLORIDE 0.4 MG/1
1 CAPSULE ORAL DAILY
Qty: 30 CAPSULE | Refills: 0 | OUTPATIENT
Start: 2024-04-11

## 2024-05-06 DIAGNOSIS — E11.9 TYPE 2 DIABETES MELLITUS WITHOUT COMPLICATION, WITHOUT LONG-TERM CURRENT USE OF INSULIN: ICD-10-CM

## 2024-05-06 RX ORDER — METFORMIN HYDROCHLORIDE 500 MG/1
500 TABLET, EXTENDED RELEASE ORAL 2 TIMES DAILY
Qty: 60 TABLET | Refills: 0 | OUTPATIENT
Start: 2024-05-06

## 2024-05-07 ENCOUNTER — OFFICE VISIT (OUTPATIENT)
Dept: FAMILY MEDICINE CLINIC | Facility: CLINIC | Age: 79
End: 2024-05-07
Payer: MEDICARE

## 2024-05-07 VITALS
HEIGHT: 66 IN | SYSTOLIC BLOOD PRESSURE: 116 MMHG | BODY MASS INDEX: 24.11 KG/M2 | WEIGHT: 150 LBS | OXYGEN SATURATION: 97 % | HEART RATE: 69 BPM | RESPIRATION RATE: 18 BRPM | DIASTOLIC BLOOD PRESSURE: 74 MMHG

## 2024-05-07 DIAGNOSIS — E11.9 TYPE 2 DIABETES MELLITUS WITHOUT COMPLICATION, WITHOUT LONG-TERM CURRENT USE OF INSULIN: ICD-10-CM

## 2024-05-07 DIAGNOSIS — N40.1 BENIGN PROSTATIC HYPERPLASIA WITH URINARY HESITANCY: ICD-10-CM

## 2024-05-07 DIAGNOSIS — R39.11 BENIGN PROSTATIC HYPERPLASIA WITH URINARY HESITANCY: ICD-10-CM

## 2024-05-07 PROCEDURE — 3078F DIAST BP <80 MM HG: CPT | Performed by: FAMILY MEDICINE

## 2024-05-07 PROCEDURE — 99214 OFFICE O/P EST MOD 30 MIN: CPT | Performed by: FAMILY MEDICINE

## 2024-05-07 PROCEDURE — 1160F RVW MEDS BY RX/DR IN RCRD: CPT | Performed by: FAMILY MEDICINE

## 2024-05-07 PROCEDURE — 1159F MED LIST DOCD IN RCRD: CPT | Performed by: FAMILY MEDICINE

## 2024-05-07 PROCEDURE — 3074F SYST BP LT 130 MM HG: CPT | Performed by: FAMILY MEDICINE

## 2024-05-07 RX ORDER — TAMSULOSIN HYDROCHLORIDE 0.4 MG/1
1 CAPSULE ORAL DAILY
Qty: 90 CAPSULE | Refills: 3 | Status: SHIPPED | OUTPATIENT
Start: 2024-05-07

## 2024-05-07 RX ORDER — METFORMIN HYDROCHLORIDE 500 MG/1
500 TABLET, EXTENDED RELEASE ORAL 2 TIMES DAILY
Qty: 180 TABLET | Refills: 3 | Status: SHIPPED | OUTPATIENT
Start: 2024-05-07

## 2024-05-07 RX ORDER — TOBRAMYCIN AND DEXAMETHASONE 3; 1 MG/ML; MG/ML
1 SUSPENSION/ DROPS OPHTHALMIC
COMMUNITY
Start: 2024-03-01

## 2024-05-07 RX ORDER — BLOOD SUGAR DIAGNOSTIC
1 STRIP MISCELLANEOUS 3 TIMES DAILY
Qty: 300 EACH | Refills: 2 | Status: SHIPPED | OUTPATIENT
Start: 2024-05-07

## 2024-06-07 DIAGNOSIS — I10 ESSENTIAL HYPERTENSION: ICD-10-CM

## 2024-06-07 DIAGNOSIS — I25.708 CORONARY ARTERY DISEASE OF BYPASS GRAFT OF NATIVE HEART WITH STABLE ANGINA PECTORIS: ICD-10-CM

## 2024-06-07 RX ORDER — ROSUVASTATIN CALCIUM 10 MG/1
10 TABLET, COATED ORAL DAILY
Qty: 90 TABLET | Refills: 3 | Status: SHIPPED | OUTPATIENT
Start: 2024-06-07

## 2024-06-27 ENCOUNTER — TRANSCRIBE ORDERS (OUTPATIENT)
Dept: PHYSICAL THERAPY | Facility: HOSPITAL | Age: 79
End: 2024-06-27
Payer: MEDICARE

## 2024-06-27 DIAGNOSIS — R42 DIZZY: Primary | ICD-10-CM

## 2024-07-23 ENCOUNTER — HOSPITAL ENCOUNTER (OUTPATIENT)
Dept: PHYSICAL THERAPY | Facility: HOSPITAL | Age: 79
Setting detail: THERAPIES SERIES
Discharge: HOME | End: 2024-07-23
Payer: MEDICARE

## 2024-07-23 DIAGNOSIS — R42 DIZZINESS: Primary | ICD-10-CM

## 2024-07-23 PROCEDURE — 97162 PT EVAL MOD COMPLEX 30 MIN: CPT | Performed by: PHYSICAL THERAPIST

## 2024-07-23 NOTE — THERAPY EVALUATION
Outpatient Physical Therapy Vestibular Initial Evaluation  [facility]     Patient Name: Kerrie Tyler  : 1945  MRN: [id number]  Today's Date: 2024      Visit Date: 2024    Patient Active Problem List   Diagnosis    Type 2 diabetes mellitus without complication, without long-term current use of insulin    Mixed hyperlipidemia    Essential hypertension    Coronary artery disease of bypass graft of native heart with stable angina pectoris    S/P CABG x 3    Benign prostatic hyperplasia with urinary hesitancy    Vitamin D deficiency    Presbycusis of both ears    Diabetic retinopathy associated with type 2 diabetes mellitus    Acute chest pain    Status post insertion of drug eluting coronary artery stent    Chest pain        Past Medical History:   Diagnosis Date    Coronary artery disease     CABAG    Diabetes mellitus 2012    Type2. Controlled    Kidney stone     Myocardial infarction         Past Surgical History:   Procedure Laterality Date    CARDIAC CATHETERIZATION N/A 2022    Procedure: Left Heart Cath  FEMORAL ACCESS;  Surgeon: Jam Gavin MD;  Location: [facility] CATH INVASIVE LOCATION;  Service: Cardiovascular;  Laterality: N/A;  Use femoral access- patient has bilateral CAMERON's with previous CABG    CARDIAC CATHETERIZATION N/A 2022    Procedure: Left ventriculography;  Surgeon: Jam Gavin MD;  Location: [facility] CATH INVASIVE LOCATION;  Service: Cardiovascular;  Laterality: N/A;    CARDIAC CATHETERIZATION N/A 2022    Procedure: Native mammary injection;  Surgeon: Jam Gavin MD;  [facility];  Service: Cardiovascular;  Laterality: N/A;    CARDIAC CATHETERIZATION  2022    Procedure: RESTING FULL CYCLE RATIO;  Surgeon: Jam Gavin MD;  [facility];  Service: Cardiovascular;;    CARDIAC CATHETERIZATION N/A 2022    Procedure: Stent LIZBET bypass graft;  Surgeon: Jam Gavin MD;   Location:  [facility] CATH INVASIVE LOCATION;  Service: Cardiovascular;  Laterality: N/A;    CARDIAC CATHETERIZATION N/A 08/24/2022    Procedure: Coronary angiography;  Surgeon: Jam Gavin MD;  Location:  [facility] CATH INVASIVE LOCATION;  Service: Cardiovascular;  Laterality: N/A;    CARDIAC CATHETERIZATION N/A 08/24/2022    Procedure: Percutaneous Coronary Intervention;  Surgeon: Jam Gavin MD;  Location:  [facility] CATH INVASIVE LOCATION;  Service: Cardiovascular;  Laterality: N/A;    CATARACT EXTRACTION, BILATERAL Bilateral 1995    CORONARY ARTERY BYPASS GRAFT  1994    3 vessel    CORONARY STENT PLACEMENT  August 2022    EXTRACORPOREAL SHOCK WAVE LITHOTRIPSY (ESWL) Bilateral 2004    TONSILLECTOMY           Visit Dx:     ICD-10-CM ICD-9-CM   1. Dizziness  R42 780.4        Patient History       Row Name 07/23/24 1100             History    Chief Complaint Dizziness  -GR      Brief Description of Current Complaint C/o generalized instability and gait, finds himself falling backwards, poor tolerance to bending foward x 3 months.  He does have diminished hearing in the R ear. He does have tinnitus in the L ear.  He had 1 fall in July last year, did not hit his head, fractured his tibia and does have long standing R knee pain. Also c/o generalized that is worse in his lower body. He denies spinning.  -GR      Occupation/sports/leisure activities retired ENT  -GR         Pain     Pain Location --  R knee pain  -GR         Fall Risk Assessment    Any falls in the past year: Yes  -GR      Number of falls reported in the last 12 months 1  -GR         Daily Activities    Primary Language Other (comment)  from [place]  -GR      Primary Language Comment speaks English  -GR      Pt Participated in POC and Goals Yes  -GR         Safety    Are you being hurt, hit, or frightened by anyone at home or in your life? No  -GR      Are you being neglected by a caregiver No  -GR                User Key  (r) = Recorded By, (t) = Taken By,  (c) = Cosigned By      Initials Name Provider Type    Rishi Dowling, PT Physical Therapist                     Vestibular Eval       Row Name 07/23/24 1100             Occulomotor Exam Fixation Present    Occular ROM Normal  -GR      Spontaneous Nystagmus Absent  -GR      Gaze-induced Nystagmus Absent  -GR      Smooth Pursuit Normal  -GR      Saccades Left:;Overshoots  -GR      Convergence Normal  -GR         Positional Testing    Positional Testing --  held  -GR         High-level Balance    High-level Balance Other modifiefd CTSIB 100/120  -GR         Cervicogenic Assessment    Cervicogenic Assess limited L at approx 45 degrees, R>60  -GR                User Key  (r) = Recorded By, (t) = Taken By, (c) = Cosigned By      Initials Name Provider Type    Rishi Dowling, PT Physical Therapist                                Therapy Education  Education Details: medbridge HEP [id number], expectations, POC                       PT OP Goals       Row Name 07/23/24 1300          PT Short Term Goals    STG 1 Patient will be no worse with VOR.  -GR     STG 1 Progress New  -GR     STG 2 Patient will utilize static fixation for in home safety.  -GR     STG 2 Progress New  -GR     STG 3 Patient will deny falls.  -GR        Long Term Goals    LTG 1 Patient will be independent with proggressive HEP.  -GR     LTG 1 Progress New  -GR     LTG 2 Patient will utilize dynamic adaptation techniques for community reintegration.  -GR     LTG 2 Progress New  -GR     LTG 3 Patient will score </= 10/100 on the DHI to indicate improved perceieved positional tolerance.  -GR     LTG 3 Progress New  -GR     LTG 4 Patient will score >/=19 on the DGI to indicate improved balance and decreasing fall ris.  -GR     LTG 4 Progress New  -GR               User Key  (r) = Recorded By, (t) = Taken By, (c) = Cosigned By      Initials Name Provider Type    Rishi Dowling, PT Physical Therapist                     PT Assessment/Plan       Row  Name 07/23/24 1300          PT Assessment    Functional Limitations Decreased safety during functional activities;Impaired gait;Limitations in community activities;Performance in leisure activities;Performance in self-care ADL  -GR     Impairments Balance;Muscle strength  -GR     Assessment Comments 78 y/o M referred to vestibular evaluation for 3 month h/o dizziness/balance deficits. He denies spinning and is concerned primarily about his balance degeneration and a generalized lower body weakness that worsened after a bout of COVID.  He overshoots saccades on the L and has a decreased modified CTSIB score. DGI is also <19 indicative of fall risk.  He may benefit from skilled PT to focus primarily on his balance deficits. Held on BPPV screen on this date due to the nature of his complaints; he is also a retired ENT.  -GR     Please refer to paper survey for additional self-reported information No  -GR     Rehab Potential Good  -GR     Patient/caregiver participated in establishment of treatment plan and goals Yes  -GR     Patient would benefit from skilled therapy intervention Yes  -GR        PT Plan    PT Frequency 2x/week  -GR     Predicted Duration of Therapy Intervention (PT) 8 visits  -GR     Planned CPT's? PT EVAL MOD COMPLELITY: 16211;PT RE-EVAL: 85025;PT THER PROC EA 15 MIN: 44191;PT NEUROMUSC RE-EDUCATION EA 15 MIN: 07410  -GR     Physical Therapy Interventions (Optional Details) balance training;home exercise program;postural re-education;vestibular training;neuromuscular re-education  -GR     PT Plan Comments Review HEP and progress with dynamic balance/lower quarter strengthening.  -GR               User Key  (r) = Recorded By, (t) = Taken By, (c) = Cosigned By      Initials Name Provider Type    GR Rishi Fierro, PT Physical Therapist                     Outcome Measure Options: Dizziness Handicap Inventory, Dynamic Gait Index  Dizziness Handicap Inventory Score: 30  Dynamic Gait Index (DGI)  Gait  Level Surface: Mild impairment: walks 20’, uses assistive devices, slower speed, mild gait deviations  Change in Gait Speed: Mild impairment: Is able to change speed but demonstrates mild gait deviations, or no gait deviations but unable to achieve a significant change in velocity, or uses as assistive device  Gait with Horizontal Head Turns: Mild impairment: Performs head turns smoothly with slight change in gait velocity, i.e. minor disruption to smooth gait path or uses walking aid.  Gait with Vertical Head Turns: Mild impairment: Performs head turns smoothly with slight change in gait velocity, i.e. minor disruption to smooth gait path or uses walking aid.  Gait and Pivot Turn: Mild impairment: pivot turns safely in >3 seconds and stops with no loss of balance.  Step Over Obstacle: Mild impairment: Is able to step over shoe box, but must slow down and adjust steps to clear box safely.  Step Around Obstacles: Moderate impairment: Is able to clear cones but must significantly slow speed to accomplish task, or requires verbal cueing.  Steps: Mild impairment: Alternating feet, must use rail.  Dynamic Gait Index Score: 15      Time Calculation:   Start Time: 1132  Stop Time: 1215  Time Calculation (min): 43 min  Total Timed Code Minutes- PT: 0 minute(s)  Untimed Charges  PT Eval/Re-eval Minutes: 43  Total Minutes  Untimed Charges Total Minutes: 43   Total Minutes: 43   Therapy Charges for Today       Code Description Service Date Service Provider Modifiers Qty    26537334980 HC PT EVAL MOD COMPLEXITY 3 7/23/2024 Rishi Fierro, PT GP 1            PT G-Codes  Outcome Measure Options: Dizziness Handicap Inventory, Dynamic Gait Index         Rishi Fierro, PT  7/23/2024

## 2024-07-26 ENCOUNTER — HOSPITAL ENCOUNTER (OUTPATIENT)
Dept: PHYSICAL THERAPY | Facility: HOSPITAL | Age: 79
Setting detail: THERAPIES SERIES
Discharge: HOME | End: 2024-07-26
Payer: MEDICARE

## 2024-07-26 DIAGNOSIS — R42 DIZZINESS: Primary | ICD-10-CM

## 2024-07-26 PROCEDURE — 97112 NEUROMUSCULAR REEDUCATION: CPT | Performed by: PHYSICAL THERAPIST

## 2024-07-26 PROCEDURE — 97110 THERAPEUTIC EXERCISES: CPT | Performed by: PHYSICAL THERAPIST

## 2024-07-26 NOTE — THERAPY TREATMENT NOTE
Outpatient Physical Therapy Vestibular Treatment Note  [facility]     Patient Name: Kerrie Tyler  : 1945  MRN: [id number]  Today's Date: 2024      Visit Date: 2024    Visit Dx:     ICD-10-CM ICD-9-CM   1. Dizziness  R42 780.4       Patient Active Problem List   Diagnosis    Type 2 diabetes mellitus without complication, without long-term current use of insulin    Mixed hyperlipidemia    Essential hypertension    Coronary artery disease of bypass graft of native heart with stable angina pectoris    S/P CABG x 3    Benign prostatic hyperplasia with urinary hesitancy    Vitamin D deficiency    Presbycusis of both ears    Diabetic retinopathy associated with type 2 diabetes mellitus    Acute chest pain    Status post insertion of drug eluting coronary artery stent    Chest pain                        PT Assessment/Plan       Row Name 24 1100          PT Assessment    Assessment Comments Patient returns for 1st follow up visit.  Added several balance exercises with noted drifting to R on progressively more challenging surfaces. Instructed to advance VOR pitch to standing at home otherwise no changes to HEP at this time.  -GR        PT Plan    PT Plan Comments Consider advancing HEP next week.  -GR               User Key  (r) = Recorded By, (t) = Taken By, (c) = Cosigned By      Initials Name Provider Type    Rishi Dowling, PT Physical Therapist                        OP Exercises       Row Name 24 1000             Subjective    Subjective Comments No changes to report since evaluation.  -GR         Subjective Pain    Able to rate subjective pain? yes  -GR      Pre-Treatment Pain Level 0  -GR      Post-Treatment Pain Level 0  -GR         Total Minutes    73206 - PT Therapeutic Exercise Minutes 10  -GR      65652 -  PT Neuromuscular Reeducation Minutes 30  -GR         Exercise 1    Exercise Name 1 Nustep  -GR      Time 1 5 min  -GR      Additional Comments L3  -GR          "Exercise 2    Exercise Name 2 VOR pitch  -GR      Cueing 2 Demo  -GR      Sets 2 1  -GR      Reps 2 2  -GR      Time 2 30 seconds  -GR      Additional Comments 2nd set standing  -GR         Exercise 3    Exercise Name 3 Rhomberg  -GR      Cueing 3 Demo  -GR      Sets 3 1  -GR      Reps 3 3  -GR      Time 3 30 seconds  -GR      Additional Comments EO/EC- airex, yellow and green foam  -GR         Exercise 4    Exercise Name 4 Semi-tandem  -GR      Cueing 4 Demo  -GR      Sets 4 1  -GR      Reps 4 3  -GR      Time 4 30 seconds  -GR      Additional Comments EO/EC- airex, yellow and green foam  -GR         Exercise 5    Exercise Name 5 SLS  -GR      Cueing 5 Demo  -GR      Sets 5 3  -GR      Reps 5 30 sec  -GR         Exercise 6    Exercise Name 6 Rockerboard - gastroc stretch  -GR      Cueing 6 Demo  -GR      Sets 6 1  -GR      Reps 6 3  -GR      Time 6 20 seconds  -GR         Exercise 7    Exercise Name 7 Rockerboard- static stance AP/ML  -GR      Cueing 7 Demo  -GR      Sets 7 1  -GR      Reps 7 3  -GR      Time 7 30 sec  -GR         Exercise 8    Exercise Name 8 Stagger stance + trunk rotation - front foot 6\" step  -GR      Cueing 8 Demo  -GR      Sets 8 1  -GR      Reps 8 20  -GR      Time 8 each side  -GR         Exercise 9    Exercise Name 9 Tandem walk- beam // bars  -GR      Cueing 9 Demo  -GR      Sets 9 2  -GR      Reps 9 3 laps  -GR         Exercise 10    Exercise Name 10 Wide walk, beam between feet  -GR      Cueing 10 Demo  -GR      Sets 10 2  -GR      Reps 10 3 laps  -GR         Exercise 11    Exercise Name 11 Panchito step overs- fwd and lateral  -GR      Cueing 11 Demo  -GR      Reps 11 3 laps each // abrs  -GR                User Key  (r) = Recorded By, (t) = Taken By, (c) = Cosigned By      Initials Name Provider Type    Rishi Dowling, PT Physical Therapist                                 PT OP Goals       Row Name 07/26/24 1100          PT Short Term Goals    STG 1 Patient will be no worse with " VOR.  -GR     STG 1 Progress Ongoing  -GR     STG 2 Patient will utilize static fixation for in home safety.  -GR     STG 2 Progress Ongoing  -GR     STG 3 Patient will deny falls.  -GR     STG 3 Progress Ongoing  -GR        Long Term Goals    LTG 1 Patient will be independent with proggressive HEP.  -GR     LTG 1 Progress Ongoing  -GR     LTG 2 Patient will utilize dynamic adaptation techniques for community reintegration.  -GR     LTG 2 Progress Ongoing  -GR     LTG 3 Patient will score </= 10/100 on the DHI to indicate improved perceieved positional tolerance.  -GR     LTG 3 Progress Ongoing  -GR     LTG 4 Patient will score >/=19 on the DGI to indicate improved balance and decreasing fall ris.  -GR     LTG 4 Progress Ongoing  -GR               User Key  (r) = Recorded By, (t) = Taken By, (c) = Cosigned By      Initials Name Provider Type    Rishi Dowling, PT Physical Therapist                                   Time Calculation:   Start Time: 1000  Stop Time: 1045  Time Calculation (min): 45 min  Total Timed Code Minutes- PT: 40 minute(s)  Timed Charges  27153 - PT Therapeutic Exercise Minutes: 10  47820 -  PT Neuromuscular Reeducation Minutes: 30  Total Minutes  Timed Charges Total Minutes: 40   Total Minutes: 40   Therapy Charges for Today       Code Description Service Date Service Provider Modifiers Qty    34227848226  PT NEUROMUSC RE EDUCATION EA 15 MIN 7/26/2024 Rishi Fierro, PT GP 2    [id number]  PT THER PROC EA 15 MIN 7/26/2024 Rishi Fierro, PT GP 1                     Rishi Fierro, PT  7/26/2024

## 2024-07-30 ENCOUNTER — HOSPITAL ENCOUNTER (OUTPATIENT)
Dept: PHYSICAL THERAPY | Facility: HOSPITAL | Age: 79
Setting detail: THERAPIES SERIES
Discharge: HOME | End: 2024-07-30
Payer: MEDICARE

## 2024-07-30 DIAGNOSIS — R42 DIZZINESS: Primary | ICD-10-CM

## 2024-07-30 PROCEDURE — 97112 NEUROMUSCULAR REEDUCATION: CPT | Performed by: PHYSICAL THERAPIST

## 2024-07-30 NOTE — THERAPY TREATMENT NOTE
Outpatient Physical Therapy Vestibular Treatment Note  [facility]     Patient Name: Kerrie Tyler  : 1945  MRN: [id number]  Today's Date: 2024      Visit Date: 2024    Visit Dx:     ICD-10-CM ICD-9-CM   1. Dizziness  R42 780.4       Patient Active Problem List   Diagnosis    Type 2 diabetes mellitus without complication, without long-term current use of insulin    Mixed hyperlipidemia    Essential hypertension    Coronary artery disease of bypass graft of native heart with stable angina pectoris    S/P CABG x 3    Benign prostatic hyperplasia with urinary hesitancy    Vitamin D deficiency    Presbycusis of both ears    Diabetic retinopathy associated with type 2 diabetes mellitus    Acute chest pain    Status post insertion of drug eluting coronary artery stent    Chest pain                        PT Assessment/Plan       Row Name 24 1400          PT Assessment    Assessment Comments Updated HEP and noted improved stability with dawit step overs.  -GR        PT Plan    PT Plan Comments Continue funcitonal balance training. Add head yaw.  -GR               User Key  (r) = Recorded By, (t) = Taken By, (c) = Cosigned By      Initials Name Provider Type    GR Rishi Fierro, PT Physical Therapist                        OP Exercises       Row Name 24 1300             Subjective    Subjective Comments Patient states he hopes he is getting better. Mild fatigue following last visit.  -GR         Subjective Pain    Able to rate subjective pain? yes  -GR      Pre-Treatment Pain Level 0  -GR      Post-Treatment Pain Level 0  -GR         Total Minutes    98656 -  PT Neuromuscular Reeducation Minutes 40  -GR         Exercise 1    Exercise Name 1 Nustep  -GR      Time 1 5 min  -GR         Exercise 2    Exercise Name 2 VOR pitch  -GR      Cueing 2 Demo  -GR      Sets 2 1  -GR      Reps 2 2  -GR      Time 2 30 seconds  -GR      Additional Comments standing on foam  -GR         Exercise 3     "Exercise Name 3 [place]  -GR      Cueing 3 Demo  -GR      Sets 3 1  -GR      Reps 3 3  -GR      Time 3 30 seconds  -GR      Additional Comments EO/EC- airex, yellow and green foam  -GR         Exercise 4    Exercise Name 4 Semi-tandem  -GR      Cueing 4 Demo  -GR      Sets 4 1  -GR      Reps 4 3  -GR      Time 4 30 seconds  -GR      Additional Comments EO/EC- airex, yellow and green foam  -GR         Exercise 6    Exercise Name 6 Stair gastroc stretch  -GR      Cueing 6 Demo  -GR      Sets 6 1  -GR      Reps 6 3  -GR      Time 6 20 seconds  -GR         Exercise 7    Exercise Name 7 Rockerboard- static stance AP/ML  -GR      Cueing 7 Demo  -GR      Sets 7 1  -GR      Reps 7 3  -GR      Time 7 30 sec  -GR         Exercise 8    Exercise Name 8 Stagger stance + trunk rotation - front foot 6\" step  -GR      Cueing 8 Demo  -GR      Sets 8 1  -GR      Reps 8 20  -GR      Time 8 each side  -GR      Additional Comments holding L1 med ball  -GR         Exercise 9    Exercise Name 9 Tandem walk- beam // bars  -GR      Cueing 9 Demo  -GR      Sets 9 3  -GR      Reps 9 3 laps  -GR         Exercise 11    Exercise Name 11 [place] step overs- fwd and lateral  -GR      Cueing 11 Demo  -GR      Reps 11 3 laps each // bars  -GR         Exercise 12    Exercise Name 12 Stagger stance - front foot BOSU- blue up  -GR      Cueing 12 Demo  -GR      Reps 12 1  -GR      Time 12 20  -GR      Additional Comments eyes open and closed  -GR                User Key  (r) = Recorded By, (t) = Taken By, (c) = Cosigned By      Initials Name Provider Type    GR Rishi Fierro, PT Physical Therapist                                 PT OP Goals       Row Name 07/30/24 1400          PT Short Term Goals    STG 1 Patient will be no worse with VOR.  -GR     STG 1 Progress Met  -GR     STG 2 Patient will utilize static fixation for in home safety.  -GR     STG 2 Progress Met  -GR     STG 3 Patient will deny falls.  -GR     STG 3 Progress Ongoing  -GR       "         User Key  (r) = Recorded By, (t) = Taken By, (c) = Cosigned By      Initials Name Provider Type    GR Rishi Fierro, PT Physical Therapist                                   Time Calculation:   Start Time: 1330  Stop Time: 1410  Time Calculation (min): 40 min  Total Timed Code Minutes- PT: 40 minute(s)  Timed Charges  82663 -  PT Neuromuscular Reeducation Minutes: 40  Total Minutes  Timed Charges Total Minutes: 40   Total Minutes: 40   Therapy Charges for Today       Code Description Service Date Service Provider Modifiers Qty    34917228306 HC PT NEUROMUSC RE EDUCATION EA 15 MIN 7/30/2024 Rishi Fierro, PT GP 3                     Rishi Fierro, PT  7/30/2024

## 2024-08-02 ENCOUNTER — HOSPITAL ENCOUNTER (OUTPATIENT)
Dept: PHYSICAL THERAPY | Facility: HOSPITAL | Age: 79
Setting detail: THERAPIES SERIES
Discharge: HOME | End: 2024-08-02
Payer: MEDICARE

## 2024-08-02 DIAGNOSIS — R42 DIZZINESS: Primary | ICD-10-CM

## 2024-08-02 PROCEDURE — 97110 THERAPEUTIC EXERCISES: CPT | Performed by: PHYSICAL THERAPIST

## 2024-08-02 PROCEDURE — 97112 NEUROMUSCULAR REEDUCATION: CPT | Performed by: PHYSICAL THERAPIST

## 2024-08-02 NOTE — THERAPY TREATMENT NOTE
Outpatient Physical Therapy Vestibular Treatment Note  [facility]     Patient Name: Kerrie Tyler  : 1945  MRN: [id number]  Today's Date: 2024      Visit Date: 2024    Visit Dx:     ICD-10-CM ICD-9-CM   1. Dizziness  R42 780.4       Patient Active Problem List   Diagnosis    Type 2 diabetes mellitus without complication, without long-term current use of insulin    Mixed hyperlipidemia    Essential hypertension    Coronary artery disease of bypass graft of native heart with stable angina pectoris    S/P CABG x 3    Benign prostatic hyperplasia with urinary hesitancy    Vitamin D deficiency    Presbycusis of both ears    Diabetic retinopathy associated with type 2 diabetes mellitus    Acute chest pain    Status post insertion of drug eluting coronary artery stent    Chest pain                        PT Assessment/Plan       Row Name 24 1100          PT Assessment    Assessment Comments 3/3 STGs met. Patient continues to progress appropriately with advancing balance exercises requiring SBA for occasional instability with stagger stance.  -GR        PT Plan    PT Plan Comments Continue to progress within tolerance and assess DOMS from new strengthening exercises.  -GR               User Key  (r) = Recorded By, (t) = Taken By, (c) = Cosigned By      Initials Name Provider Type    GR Rishi Fierro, PT Physical Therapist                        OP Exercises       Row Name 24 1100             Subjective    Subjective Comments Thinks he is more steady.  -GR         Subjective Pain    Able to rate subjective pain? yes  -GR      Pre-Treatment Pain Level 0  -GR      Post-Treatment Pain Level 0  -GR         Total Minutes    90862 - PT Therapeutic Exercise Minutes 23  -GR      07421 -  PT Neuromuscular Reeducation Minutes 20  -GR         Exercise 1    Exercise Name 1 Nustep  -GR      Time 1 5 min  -GR      Additional Comments L5  -GR         Exercise 3    Exercise Name 3 Rhomberg  -GR       "Cueing 3 Demo  -GR      Sets 3 1  -GR      Reps 3 3  -GR      Time 3 30 seconds  -GR      Additional Comments airex EO/EC  -GR         Exercise 4    Exercise Name 4 Tandem  -GR      Cueing 4 Demo  -GR      Sets 4 1  -GR      Reps 4 3  -GR      Time 4 30 seconds  -GR      Additional Comments airex EO/EC  -GR         Exercise 5    Exercise Name 5 MIP airex  -GR      Cueing 5 Demo  -GR      Sets 5 1  -GR      Reps 5 20  -GR         Exercise 6    Exercise Name 6 Rockerboard - gastroc stretch  -GR      Cueing 6 Demo  -GR      Sets 6 1  -GR      Reps 6 3  -GR      Time 6 20 seconds  -GR         Exercise 7    Exercise Name 7 Rockerboard- static stance AP/ML  -GR      Cueing 7 Demo  -GR      Sets 7 1  -GR      Reps 7 3  -GR      Time 7 30 sec  -GR         Exercise 8    Exercise Name 8 Stagger stance + trunk rotation - front foot 6\" step and PNF chop D2  -GR      Cueing 8 Demo  -GR      Sets 8 2  -GR      Reps 8 20  -GR      Time 8 each side  -GR      Additional Comments holding light ball  -GR         Exercise 9    Exercise Name 9 Tandem walk- beam // bars  -GR      Cueing 9 Demo  -GR      Sets 9 3  -GR      Reps 9 3 laps  -GR         Exercise 10    Exercise Name 10 Sidestepping  -GR      Reps 10 3 laps  -GR      Time 10 RTB at knees  -GR      Additional Comments fwd  -GR         Exercise 11    Exercise Name 11 Monster walk  -GR      Reps 11 3 laps  -GR      Time 11 RTB at knees  -GR         Exercise 12    Exercise Name 12 walk with chest press  -GR      Reps 12 3 laps  -GR      Time 12 light ball  -GR         Exercise 13    Exercise Name 13 walk with trunk rotation  -GR      Reps 13 3 laps  -GR      Time 13 light ball  -GR         Exercise 14    Exercise Name 14 standing hip ext  -GR      Cueing 14 Demo  -GR      Sets 14 2  -GR      Reps 14 10  -GR      Time 14 2#  -GR      Additional Comments each  -GR         Exercise 15    Exercise Name 15 standing HS curl  -GR      Cueing 15 Demo  -GR      Sets 15 2  -GR      Reps 15 " 10  -GR      Time 15 2#  -GR      Additional Comments each  -GR         Exercise 16    Exercise Name 16 standing hip abd  -GR      Cueing 16 Demo  -GR      Sets 16 2  -GR      Reps 16 10  -GR      Time 16 2#  -GR      Additional Comments each  -GR                User Key  (r) = Recorded By, (t) = Taken By, (c) = Cosigned By      Initials Name Provider Type    Rishi Dowling, PT Physical Therapist                                 PT OP Goals       Row Name 08/02/24 1100          PT Short Term Goals    STG 1 Patient will be no worse with VOR.  -GR     STG 1 Progress Met  -GR     STG 2 Patient will utilize static fixation for in home safety.  -GR     STG 2 Progress Met  -GR     STG 3 Patient will deny falls.  -GR     STG 3 Progress Met  -GR        Long Term Goals    LTG 1 Patient will be independent with proggressive HEP.  -GR     LTG 1 Progress Ongoing  -GR     LTG 2 Patient will utilize dynamic adaptation techniques for community reintegration.  -GR     LTG 2 Progress Ongoing  -GR     LTG 3 Patient will score </= 10/100 on the DHI to indicate improved perceieved positional tolerance.  -GR     LTG 3 Progress Ongoing  -GR     LTG 4 Patient will score >/=19 on the DGI to indicate improved balance and decreasing fall ris.  -GR     LTG 4 Progress Ongoing  -GR               User Key  (r) = Recorded By, (t) = Taken By, (c) = Cosigned By      Initials Name Provider Type    Rishi Dowling, PT Physical Therapist                                   Time Calculation:   Start Time: 1044  Stop Time: 1127  Time Calculation (min): 43 min  Total Timed Code Minutes- PT: 43 minute(s)  Timed Charges  06750 - PT Therapeutic Exercise Minutes: 23  20898 -  PT Neuromuscular Reeducation Minutes: 20  Total Minutes  Timed Charges Total Minutes: 43   Total Minutes: 43   Therapy Charges for Today       Code Description Service Date Service Provider Modifiers Qty    09213074522 HC PT NEUROMUSC RE EDUCATION EA 15 MIN 8/2/2024 Sri  Rishi DAWSON, PT GP 1    [id number]  PT THER PROC EA 15 MIN 8/2/2024 Rishi Fierro, PT GP 2                     Rishi Fierro, PT  8/2/2024

## 2024-08-06 ENCOUNTER — HOSPITAL ENCOUNTER (OUTPATIENT)
Dept: PHYSICAL THERAPY | Facility: HOSPITAL | Age: 79
Setting detail: THERAPIES SERIES
Discharge: HOME | End: 2024-08-06
Payer: MEDICARE

## 2024-08-06 DIAGNOSIS — R42 DIZZINESS: Primary | ICD-10-CM

## 2024-08-06 PROCEDURE — 97110 THERAPEUTIC EXERCISES: CPT | Performed by: PHYSICAL THERAPIST

## 2024-08-06 PROCEDURE — 97112 NEUROMUSCULAR REEDUCATION: CPT | Performed by: PHYSICAL THERAPIST

## 2024-08-06 NOTE — THERAPY TREATMENT NOTE
Outpatient Physical Therapy Vestibular Treatment Note  [facility]     Patient Name: Kerrie Tyler  : 1945  MRN: [id number]  Today's Date: 2024      Visit Date: 2024    Visit Dx:     ICD-10-CM ICD-9-CM   1. Dizziness  R42 780.4       Patient Active Problem List   Diagnosis    Type 2 diabetes mellitus without complication, without long-term current use of insulin    Mixed hyperlipidemia    Essential hypertension    Coronary artery disease of bypass graft of native heart with stable angina pectoris    S/P CABG x 3    Benign prostatic hyperplasia with urinary hesitancy    Vitamin D deficiency    Presbycusis of both ears    Diabetic retinopathy associated with type 2 diabetes mellitus    Acute chest pain    Status post insertion of drug eluting coronary artery stent    Chest pain                        PT Assessment/Plan       Row Name 24 1100          PT Assessment    Assessment Comments Focused on strength at the start of session due to his c/o continued weakness but improving stability..  Advanced distance with dynamic gait, reaching fatigue by end of session. He remains appropriate for skilled PT.  -GR        PT Plan    PT Plan Comments Consider BIG movement series.  -GR               User Key  (r) = Recorded By, (t) = Taken By, (c) = Cosigned By      Initials Name Provider Type    GR Rishi Fierro, PT Physical Therapist                        OP Exercises       Row Name 24 1000             Subjective    Subjective Comments Better, muscles still feel weak.  -GR         Subjective Pain    Able to rate subjective pain? yes  -GR      Pre-Treatment Pain Level 0  -GR      Post-Treatment Pain Level 0  -GR         Total Minutes    66836 - PT Therapeutic Exercise Minutes 20  -GR      51405 -  PT Neuromuscular Reeducation Minutes 25  -GR         Exercise 1    Exercise Name 1 Nustep  -GR      Time 1 5 min  -GR      Additional Comments L5  -GR         Exercise 3    Exercise Name 3 [place]   -GR      Cueing 3 Demo  -GR      Sets 3 1  -GR      Reps 3 3  -GR      Time 3 30 seconds  -GR      Additional Comments airex EO/EC  -GR         Exercise 4    Exercise Name 4 Tandem  -GR      Cueing 4 Demo  -GR      Sets 4 1  -GR      Reps 4 3  -GR      Time 4 30 seconds  -GR      Additional Comments airex EO/EC  -GR         Exercise 5    Exercise Name 5 --  -GR      Cueing 5 --  -GR      Sets 5 --  -GR      Reps 5 --  -GR         Exercise 6    Exercise Name 6 Rockerboard - gastroc stretch  -GR      Cueing 6 Demo  -GR      Sets 6 1  -GR      Reps 6 3  -GR      Time 6 20 seconds  -GR         Exercise 7    Exercise Name 7 Rockerboard- static stance AP/ML  -GR      Cueing 7 Demo  -GR      Sets 7 1  -GR      Reps 7 3  -GR      Time 7 30 sec  -GR         Exercise 8    Exercise Name 8 Stagger stance front foot BOSU  -GR      Cueing 8 Demo  -GR      Sets 8 4  -GR      Reps 8 20  -GR      Time 8 each side  -GR      Additional Comments EC, trunk rotation, VOR yaw and pitch  -GR         Exercise 9    Exercise Name 9 Tandem walk- beam // bars  -GR      Cueing 9 Demo  -GR      Sets 9 3  -GR      Reps 9 3 laps  -GR         Exercise 10    Exercise Name 10 Sidestepping  -GR      Reps 10 3 laps  -GR      Time 10 RTB at knees  -GR      Additional Comments fwd  -GR         Exercise 11    Exercise Name 11 Monster walk  -GR      Reps 11 3 laps  -GR      Time 11 RTB at knees  -GR         Exercise 12    Exercise Name 12 walk with chest press  -GR      Reps 12 1/2 lap clinic  -GR      Time 12 L1 med ball  -GR         Exercise 13    Exercise Name 13 walk with trunk rotation  -GR      Reps 13 1/2 lap clinic  -GR      Time 13 L1 med ball  -GR         Exercise 14    Exercise Name 14 standing hip ext  -GR      Cueing 14 Demo  -GR      Sets 14 2  -GR      Reps 14 10  -GR      Time 14 2#  -GR      Additional Comments each  -GR         Exercise 15    Exercise Name 15 standing HS curl  -GR      Cueing 15 Demo  -GR      Sets 15 2  -GR      Reps 15  10  -GR      Time 15 2#  -GR      Additional Comments each  -GR         Exercise 16    Exercise Name 16 standing hip abd  -GR      Cueing 16 Demo  -GR      Sets 16 2  -GR      Reps 16 10  -GR      Time 16 2#  -GR      Additional Comments each  -GR                User Key  (r) = Recorded By, (t) = Taken By, (c) = Cosigned By      Initials Name Provider Type    GR Rishi Fierro, PT Physical Therapist                                                   Time Calculation:   Start Time: 1045  Stop Time: 1130  Time Calculation (min): 45 min  Total Timed Code Minutes- PT: 45 minute(s)  Timed Charges  23967 - PT Therapeutic Exercise Minutes: 20  84191 -  PT Neuromuscular Reeducation Minutes: 25  Total Minutes  Timed Charges Total Minutes: 45   Total Minutes: 45   Therapy Charges for Today       Code Description Service Date Service Provider Modifiers Qty    45255678442 HC PT NEUROMUSC RE EDUCATION EA 15 MIN 8/6/2024 Rishi Fierro, PT GP 2    05460462286 HC PT THER PROC EA 15 MIN 8/6/2024 Rishi Fierro, PT GP 1                     Rishi Fierro, FANG  8/6/2024

## 2024-08-09 ENCOUNTER — HOSPITAL ENCOUNTER (OUTPATIENT)
Dept: PHYSICAL THERAPY | Facility: HOSPITAL | Age: 79
Setting detail: THERAPIES SERIES
Discharge: HOME | End: 2024-08-09
Payer: MEDICARE

## 2024-08-09 DIAGNOSIS — R42 DIZZINESS: Primary | ICD-10-CM

## 2024-08-09 PROCEDURE — 97112 NEUROMUSCULAR REEDUCATION: CPT | Performed by: PHYSICAL THERAPIST

## 2024-08-09 PROCEDURE — 97110 THERAPEUTIC EXERCISES: CPT | Performed by: PHYSICAL THERAPIST

## 2024-08-09 NOTE — THERAPY TREATMENT NOTE
"  Outpatient Physical Therapy Vestibular Treatment Note  [facility]     Patient Name: Kerrie Tyler  : 1945  MRN: [id number]  Today's Date: 2024      Visit Date: 2024    Visit Dx:     ICD-10-CM ICD-9-CM   1. Dizziness  R42 780.4       Patient Active Problem List   Diagnosis    Type 2 diabetes mellitus without complication, without long-term current use of insulin    Mixed hyperlipidemia    Essential hypertension    Coronary artery disease of bypass graft of native heart with stable angina pectoris    S/P CABG x 3    Benign prostatic hyperplasia with urinary hesitancy    Vitamin D deficiency    Presbycusis of both ears    Diabetic retinopathy associated with type 2 diabetes mellitus    Acute chest pain    Status post insertion of drug eluting coronary artery stent    Chest pain                        PT Assessment/Plan       Row Name 24 1100          PT Assessment    Assessment Comments Retropulsive on BOSU requiring SBA/CGA for safety.  Advanced ankle weights with decreased fatigue.  -GR        PT Plan    PT Plan Comments D/c end of next week.  -GR               User Key  (r) = Recorded By, (t) = Taken By, (c) = Cosigned By      Initials Name Provider Type    GR Rishi Fierro, PT Physical Therapist                        OP Exercises       Row Name 24 1100             Subjective    Subjective Comments \"so-so\"  -GR         Subjective Pain    Able to rate subjective pain? yes  -GR      Pre-Treatment Pain Level 0  -GR      Post-Treatment Pain Level 0  -GR         Total Minutes    39016 - PT Therapeutic Exercise Minutes 20  -GR      92778 -  PT Neuromuscular Reeducation Minutes 20  -GR         Exercise 1    Exercise Name 1 Nustep  -GR      Time 1 5 min  -GR         Exercise 2    Exercise Name 2 VOR pitch and yaw  -GR      Cueing 2 Demo  -GR      Sets 2 1  -GR      Reps 2 2  -GR      Time 2 30 seconds  -GR      Additional Comments front foot 6\" + airex  -GR         Exercise 3    " Exercise Name 3 [place]omberg  -GR      Cueing 3 Demo  -GR      Sets 3 1  -GR      Reps 3 3  -GR      Time 3 30 seconds  -GR      Additional Comments airex EO/EC  -GR         Exercise 4    Exercise Name 4 Tandem  -GR      Cueing 4 Demo  -GR      Sets 4 1  -GR      Reps 4 3  -GR      Time 4 30 seconds  -GR      Additional Comments airex EO/EC  -GR         Exercise 5    Exercise Name 5 BIG lateral step and reach  -GR      Cueing 5 Demo  -GR      Sets 5 1  -GR      Reps 5 10  -GR      Time 5 each direction  -GR         Exercise 6    Exercise Name 6 Stair gastroc stretch  -GR      Cueing 6 Demo  -GR      Sets 6 1  -GR      Reps 6 3  -GR      Time 6 20 seconds  -GR         Exercise 8    Exercise Name 8 BOSU static stance  -GR      Cueing 8 Demo  -GR      Sets 8 4  -GR      Reps 8 20  -GR         Exercise 9    Exercise Name 9 Tandem walk- beam // bars  -GR      Cueing 9 Demo  -GR      Sets 9 3  -GR      Reps 9 3 laps  -GR         Exercise 12    Exercise Name 12 walk with chest press  -GR      Reps 12 3 laps // bars  -GR      Time 12 L1 med ball  -GR         Exercise 13    Exercise Name 13 walk with trunk rotation  -GR      Reps 13 3 laps //  bars  -GR      Time 13 L1 med ball  -GR         Exercise 14    Exercise Name 14 standing hip ext  -GR      Cueing 14 Demo  -GR      Sets 14 2  -GR      Reps 14 10  -GR      Time 14 3#  -GR      Additional Comments each  -GR         Exercise 15    Exercise Name 15 standing HS curl  -GR      Cueing 15 Demo  -GR      Sets 15 2  -GR      Reps 15 10  -GR      Time 15 3#  -GR      Additional Comments each  -GR         Exercise 16    Exercise Name 16 standing hip abd  -GR      Cueing 16 Demo  -GR      Sets 16 2  -GR      Reps 16 10  -GR      Time 16 3#  -GR      Additional Comments each  -GR                User Key  (r) = Recorded By, (t) = Taken By, (c) = Cosigned By      Initials Name Provider Type    GR Rishi Fierro, PT Physical Therapist                                 PT OP Goals        Row Name 08/09/24 1100          PT Short Term Goals    STG 1 Patient will be no worse with VOR.  -GR     STG 1 Progress Met  -GR     STG 2 Patient will utilize static fixation for in home safety.  -GR     STG 2 Progress Met  -GR     STG 3 Patient will deny falls.  -GR     STG 3 Progress Met  -GR        Long Term Goals    LTG 1 Patient will be independent with proggressive HEP.  -GR     LTG 1 Progress Ongoing  -GR     LTG 2 Patient will utilize dynamic adaptation techniques for community reintegration.  -GR     LTG 2 Progress Ongoing  -GR     LTG 3 Patient will score </= 10/100 on the DHI to indicate improved perceieved positional tolerance.  -GR     LTG 3 Progress Ongoing  -GR     LTG 4 Patient will score >/=19 on the DGI to indicate improved balance and decreasing fall ris.  -GR     LTG 4 Progress Ongoing  -GR               User Key  (r) = Recorded By, (t) = Taken By, (c) = Cosigned By      Initials Name Provider Type     Rishi Fierro, PT Physical Therapist                                   Time Calculation:   Start Time: 1046  Stop Time: 1128  Time Calculation (min): 42 min  Total Timed Code Minutes- PT: 40 minute(s)  Timed Charges  19909 - PT Therapeutic Exercise Minutes: 20  02143 -  PT Neuromuscular Reeducation Minutes: 20  Total Minutes  Timed Charges Total Minutes: 40   Total Minutes: 40   Therapy Charges for Today       Code Description Service Date Service Provider Modifiers Qty    [id number]  PT NEUROMUSC RE EDUCATION EA 15 MIN 8/9/2024 Rishi Fierro, PT GP 2    [id number]  PT THER PROC EA 15 MIN 8/9/2024 Rishi Fierro, PT GP 1                     Rishi Fierro, FANG  8/9/2024

## 2024-08-12 ENCOUNTER — HOSPITAL ENCOUNTER (OUTPATIENT)
Dept: PHYSICAL THERAPY | Facility: HOSPITAL | Age: 79
Setting detail: THERAPIES SERIES
Discharge: HOME | End: 2024-08-12
Payer: MEDICARE

## 2024-08-12 DIAGNOSIS — R42 DIZZINESS: Primary | ICD-10-CM

## 2024-08-12 PROCEDURE — 97112 NEUROMUSCULAR REEDUCATION: CPT | Performed by: PHYSICAL THERAPIST

## 2024-08-12 PROCEDURE — 97110 THERAPEUTIC EXERCISES: CPT | Performed by: PHYSICAL THERAPIST

## 2024-08-12 NOTE — THERAPY TREATMENT NOTE
Outpatient Physical Therapy Vestibular Treatment Note  [facility]     Patient Name: Kerrie Tyler  : 1945  MRN: [id number]  Today's Date: 2024      Visit Date: 2024    Visit Dx:     ICD-10-CM ICD-9-CM   1. Dizziness  R42 780.4       Patient Active Problem List   Diagnosis    Type 2 diabetes mellitus without complication, without long-term current use of insulin    Mixed hyperlipidemia    Essential hypertension    Coronary artery disease of bypass graft of native heart with stable angina pectoris    S/P CABG x 3    Benign prostatic hyperplasia with urinary hesitancy    Vitamin D deficiency    Presbycusis of both ears    Diabetic retinopathy associated with type 2 diabetes mellitus    Acute chest pain    Status post insertion of drug eluting coronary artery stent    Chest pain                        PT Assessment/Plan       Row Name 24 1100          PT Assessment    Assessment Comments Patient reporting steady improvement in his daily life as with less drifting.  He is less reliant on UE support in clinic with dynamic balance, occasional sidestepping strategy noted.  -GR        PT Plan    PT Plan Comments d/c next visit.  -GR               User Key  (r) = Recorded By, (t) = Taken By, (c) = Cosigned By      Initials Name Provider Type    GR Rishi Fierro, PT Physical Therapist                        OP Exercises       Row Name 24 1100             Subjective    Subjective Comments Less drifting.  -GR         Subjective Pain    Able to rate subjective pain? yes  -GR      Pre-Treatment Pain Level 0  -GR      Post-Treatment Pain Level 0  -GR         Total Minutes    57153 - PT Therapeutic Exercise Minutes 23  -GR      97915 -  PT Neuromuscular Reeducation Minutes 20  -GR         Exercise 1    Exercise Name 1 Nustep  -GR      Time 1 5 min  -GR      Additional Comments L5  -GR         Exercise 2    Exercise Name 2 VOR pitch and yaw  -GR      Cueing 2 Demo  -GR      Sets 2 1  -GR       Reps 2 2  -GR      Time 2 30 seconds  -GR      Additional Comments airex - rhomberg and semi tandem  -GR         Exercise 3    Exercise Name 3 Rhomberg  -GR      Cueing 3 Demo  -GR      Sets 3 1  -GR      Reps 3 3  -GR      Time 3 30 seconds  -GR      Additional Comments airex EO/EC  -GR         Exercise 4    Exercise Name 4 Tandem  -GR      Cueing 4 Demo  -GR      Sets 4 1  -GR      Reps 4 3  -GR      Time 4 30 seconds  -GR      Additional Comments airex EO/EC  -GR         Exercise 6    Exercise Name 6 Rockerboard - gastroc stretch  -GR      Cueing 6 Demo  -GR      Sets 6 1  -GR      Reps 6 3  -GR      Time 6 20 seconds  -GR         Exercise 7    Exercise Name 7 Rockerboard- static stance AP/ML  -GR      Cueing 7 Demo  -GR      Sets 7 1  -GR      Reps 7 3  -GR      Time 7 30 sec  -GR         Exercise 8    Exercise Name 8 BOSU static stance  -GR      Cueing 8 Demo  -GR      Sets 8 4  -GR      Reps 8 20  -GR      Additional Comments black up and blue up  -GR         Exercise 9    Exercise Name 9 Tandem walk- beam // bars  -GR      Cueing 9 Demo  -GR      Sets 9 3  -GR      Reps 9 3 laps  -GR         Exercise 12    Exercise Name 12 walk with chest press  -GR      Reps 12 3 laps // bars  -GR      Time 12 2#  -GR         Exercise 13    Exercise Name 13 walk with trunk rotation  -GR      Reps 13 3 laps //  bars  -GR      Time 13 2#  -GR         Exercise 14    Exercise Name 14 standing hip ext  -GR      Cueing 14 Demo  -GR      Sets 14 2  -GR      Reps 14 10  -GR      Time 14 3#  -GR      Additional Comments each  -GR         Exercise 15    Exercise Name 15 standing HS curl  -GR      Cueing 15 Demo  -GR      Sets 15 2  -GR      Reps 15 10  -GR      Time 15 3#  -GR      Additional Comments each  -GR         Exercise 16    Exercise Name 16 standing hip abd  -GR      Cueing 16 Demo  -GR      Sets 16 2  -GR      Reps 16 10  -GR      Time 16 3#  -GR      Additional Comments each  -GR                User Key  (r) = Recorded By,  (t) = Taken By, (c) = Cosigned By      Initials Name Provider Type    Rishi Dowling, PT Physical Therapist                                 PT OP Goals       Row Name 08/12/24 1100          PT Short Term Goals    STG 1 Patient will be no worse with VOR.  -GR     STG 1 Progress Met  -GR     STG 2 Patient will utilize static fixation for in home safety.  -GR     STG 2 Progress Met  -GR     STG 3 Patient will deny falls.  -GR     STG 3 Progress Met  -GR        Long Term Goals    LTG 1 Patient will be independent with proggressive HEP.  -GR     LTG 1 Progress Ongoing  -GR     LTG 2 Patient will utilize dynamic adaptation techniques for community reintegration.  -GR     LTG 2 Progress Ongoing  -GR     LTG 3 Patient will score </= 10/100 on the DHI to indicate improved perceieved positional tolerance.  -GR     LTG 3 Progress Ongoing  -GR     LTG 4 Patient will score >/=19 on the DGI to indicate improved balance and decreasing fall ris.  -GR     LTG 4 Progress Ongoing  -GR               User Key  (r) = Recorded By, (t) = Taken By, (c) = Cosigned By      Initials Name Provider Type    Rishi Dowling, PT Physical Therapist                                   Time Calculation:   Start Time: 1046  Stop Time: 1130  Time Calculation (min): 44 min  Total Timed Code Minutes- PT: 43 minute(s)  Timed Charges  86077 - PT Therapeutic Exercise Minutes: 23  59049 -  PT Neuromuscular Reeducation Minutes: 20  Total Minutes  Timed Charges Total Minutes: 43   Total Minutes: 43   Therapy Charges for Today       Code Description Service Date Service Provider Modifiers Qty    68501879092 HC PT NEUROMUSC RE EDUCATION EA 15 MIN 8/12/2024 Rishi Fierro, PT GP 1    84520354330 HC PT THER PROC EA 15 MIN 8/12/2024 Rishi Fierro, PT GP 2                     Rishi Fierro PT  8/12/2024

## 2024-08-14 DIAGNOSIS — I10 ESSENTIAL HYPERTENSION: ICD-10-CM

## 2024-08-14 RX ORDER — TRANDOLAPRIL TABLETS 4 MG/1
4 TABLET ORAL 2 TIMES DAILY
Qty: 180 TABLET | Refills: 1 | Status: SHIPPED | OUTPATIENT
Start: 2024-08-14

## 2024-08-15 ENCOUNTER — HOSPITAL ENCOUNTER (OUTPATIENT)
Dept: PHYSICAL THERAPY | Facility: HOSPITAL | Age: 79
Setting detail: THERAPIES SERIES
Discharge: HOME | End: 2024-08-15
Payer: MEDICARE

## 2024-08-15 DIAGNOSIS — R42 DIZZINESS: Primary | ICD-10-CM

## 2024-08-15 PROCEDURE — 97112 NEUROMUSCULAR REEDUCATION: CPT | Performed by: PHYSICAL THERAPIST

## 2024-08-15 PROCEDURE — 97110 THERAPEUTIC EXERCISES: CPT | Performed by: PHYSICAL THERAPIST

## 2024-08-15 NOTE — THERAPY DISCHARGE NOTE
Outpatient Physical Therapy Vestibular Treatment Note/Discharge Summary  [facility]     Patient Name: Kerrie Tyler  : 1945  MRN: [id number]  Today's Date: 8/15/2024      Visit Date: 08/15/2024    Visit Dx:     ICD-10-CM ICD-9-CM   1. Dizziness  R42 780.4       Patient Active Problem List   Diagnosis    Type 2 diabetes mellitus without complication, without long-term current use of insulin    Mixed hyperlipidemia    Essential hypertension    Coronary artery disease of bypass graft of native heart with stable angina pectoris    S/P CABG x 3    Benign prostatic hyperplasia with urinary hesitancy    Vitamin D deficiency    Presbycusis of both ears    Diabetic retinopathy associated with type 2 diabetes mellitus    Acute chest pain    Status post insertion of drug eluting coronary artery stent    Chest pain                         PT Assessment/Plan       Row Name 08/15/24 1000          PT Assessment    Assessment Comments 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 He met 3/3 STGs and 2/4 LTGs.  He has advanced to an I HEP and is reporting improvment in his overall balance with ADLs. D/c to HEP recommended. Thank you for this referral.  -GR        PT Plan    PT Plan Comments dc to HEP.  -GR               User Key  (r) = Recorded By, (t) = Taken By, (c) = Cosigned By      Initials Name Provider Type    GR Rishi Fierro, PT Physical Therapist                          OP Exercises       Row Name 08/15/24 1000             Subjective    Subjective Comments Last day.  -GR         Subjective Pain    Able to rate subjective pain? yes  -GR      Pre-Treatment Pain Level 0  -GR      Post-Treatment Pain Level 0  -GR         Total Minutes    40496 - PT Therapeutic Exercise Minutes 21  -GR      44258 -  PT Neuromuscular Reeducation Minutes 20  -GR         Exercise 1    Exercise Name 1 Nustep  -GR      Time 1 5 min  -GR      Additional Comments L6  -GR         Exercise 2    Exercise Name 2 DGI:  18  -GR         Exercise 3    Exercise Name 3 Rhomberg  -GR      Cueing 3 Demo  -GR      Sets 3 1  -GR      Reps 3 3  -GR      Time 3 30 seconds  -GR      Additional Comments airex EO/EC  -GR         Exercise 4    Exercise Name 4 Tandem  -GR      Cueing 4 Demo  -GR      Sets 4 1  -GR      Reps 4 3  -GR      Time 4 30 seconds  -GR      Additional Comments airex EO/EC  -GR         Exercise 6    Exercise Name 6 Rockerboard - gastroc stretch  -GR      Cueing 6 Demo  -GR      Sets 6 1  -GR      Reps 6 3  -GR      Time 6 20 seconds  -GR         Exercise 7    Exercise Name 7 Rockerboard- static stance AP/ML  -GR      Cueing 7 Demo  -GR      Sets 7 1  -GR      Reps 7 3  -GR      Time 7 30 sec  -GR         Exercise 8    Exercise Name 8 BOSU static stance  -GR      Cueing 8 Demo  -GR      Sets 8 4  -GR      Reps 8 20  -GR         Exercise 9    Exercise Name 9 Tandem walk- beam // bars  -GR      Cueing 9 Demo  -GR      Sets 9 3  -GR      Reps 9 3 laps  -GR         Exercise 12    Exercise Name 12 walk with chest press  -GR      Reps 12 3 laps // bars  -GR      Time 12 L3 ball  -GR         Exercise 13    Exercise Name 13 walk with trunk rotation  -GR      Reps 13 3 laps //  bars  -GR      Time 13 L3 ball  -GR         Exercise 14    Exercise Name 14 standing hip ext  -GR      Cueing 14 Demo  -GR      Sets 14 2  -GR      Reps 14 10  -GR      Time 14 4#  -GR      Additional Comments eahc  -GR         Exercise 15    Exercise Name 15 standing HS curl  -GR      Cueing 15 Demo  -GR      Sets 15 2  -GR      Reps 15 10  -GR      Time 15 4#  -GR      Additional Comments each  -GR         Exercise 16    Exercise Name 16 standing hip abd  -GR      Cueing 16 Demo  -GR      Sets 16 2  -GR      Reps 16 10  -GR      Time 16 4#  -GR      Additional Comments each  -GR                User Key  (r) = Recorded By, (t) = Taken By, (c) = Cosigned By      Initials Name Provider Type    GR Rishi Fierro, PT Physical Therapist                                    PT OP Goals       Row Name 08/15/24 1000          PT Short Term Goals    STG 1 Patient will be no worse with VOR.  -GR     STG 1 Progress Met  -GR     STG 2 Patient will utilize static fixation for in home safety.  -GR     STG 2 Progress Met  -GR     STG 3 Patient will deny falls.  -GR     STG 3 Progress Met  -GR        Long Term Goals    LTG 1 Patient will be independent with proggressive HEP.  -GR     LTG 1 Progress Met  -GR     LTG 2 Patient will utilize dynamic adaptation techniques for community reintegration.  -GR     LTG 2 Progress Met  -GR     LTG 3 Patient will score </= 10/100 on the DHI to indicate improved perceieved positional tolerance.  -GR     LTG 3 Progress Not Met  -GR     LTG 3 Progress Comments 48/100  -GR     LTG 4 --  -GR     LTG 4 Progress Not Met  -GR     LTG 4 Progress Comments 18  -GR               User Key  (r) = Recorded By, (t) = Taken By, (c) = Cosigned By      Initials Name Provider Type    Rishi Dowling, PT Physical Therapist                         Outcome Measure Options: Dizziness Handicap Inventory, Dynamic Gait Index  Dynamic Gait Index (DGI)  Gait Level Surface: Normal: walks 20’, no assistive devices, good speed, no evidence for imbalance, normal gait pattern  Change in Gait Speed: Normal: Able to smoothly change walking speed without loss of balance or gait deviation. Shows significant difference in walking speeds between normal, fast and slow paces.  Gait with Horizontal Head Turns: Mild impairment: Performs head turns smoothly with slight change in gait velocity, i.e. minor disruption to smooth gait path or uses walking aid.  Gait with Vertical Head Turns: Mild impairment: Performs head turns smoothly with slight change in gait velocity, i.e. minor disruption to smooth gait path or uses walking aid.  Gait and Pivot Turn: Mild impairment: pivot turns safely in >3 seconds and stops with no loss of balance.  Step Over Obstacle: Mild impairment: Is  able to step over shoe box, but must slow down and adjust steps to clear box safely.  Step Around Obstacles: Mild impairment: Is able to step around both cones, but must slow down and adjust steps to clear cones.  Steps: Mild impairment: Alternating feet, must use rail.  Dynamic Gait Index Score: 18      Time Calculation:   Start Time: 1046  Stop Time: 1130  Time Calculation (min): 44 min  Total Timed Code Minutes- PT: 41 minute(s)  Timed Charges  49075 - PT Therapeutic Exercise Minutes: 21  10804 -  PT Neuromuscular Reeducation Minutes: 20  Total Minutes  Timed Charges Total Minutes: 41   Total Minutes: 41     Therapy Charges for Today       Code Description Service Date Service Provider Modifiers Qty    98383246162 HC PT NEUROMUSC RE EDUCATION EA 15 MIN 8/15/2024 Rishi Fierro, PT GP 1    10320942966 HC PT THER PROC EA 15 MIN 8/15/2024 Rishi Fierro, PT GP 2            PT G-Codes  Outcome Measure Options: Dizziness Handicap Inventory, Dynamic Gait Index     OP PT Discharge Summary  Date of Discharge: 08/15/24  Reason for Discharge: Maximum functional potential achieved  Outcomes Achieved: Patient able to partially acheive established goals  Discharge Destination: Home with home program        Rishi Fierro, PT  8/15/2024

## 2024-12-03 ENCOUNTER — TELEPHONE (OUTPATIENT)
Dept: FAMILY MEDICINE CLINIC | Facility: CLINIC | Age: 79
End: 2024-12-03
Payer: MEDICARE

## 2024-12-03 NOTE — TELEPHONE ENCOUNTER
Patient's son wanting to change from a video visit to an in person visit for the same time tomorrow. Please advise.

## 2024-12-04 ENCOUNTER — OFFICE VISIT (OUTPATIENT)
Dept: FAMILY MEDICINE CLINIC | Facility: CLINIC | Age: 79
End: 2024-12-04
Payer: MEDICARE

## 2024-12-04 VITALS
OXYGEN SATURATION: 97 % | HEART RATE: 72 BPM | WEIGHT: 152.7 LBS | DIASTOLIC BLOOD PRESSURE: 62 MMHG | RESPIRATION RATE: 16 BRPM | SYSTOLIC BLOOD PRESSURE: 160 MMHG | BODY MASS INDEX: 24.54 KG/M2 | HEIGHT: 66 IN

## 2024-12-04 DIAGNOSIS — E11.9 TYPE 2 DIABETES MELLITUS WITHOUT COMPLICATION, WITHOUT LONG-TERM CURRENT USE OF INSULIN: ICD-10-CM

## 2024-12-04 DIAGNOSIS — Z00.00 ROUTINE HEALTH MAINTENANCE: Primary | ICD-10-CM

## 2024-12-04 DIAGNOSIS — R54 FRAILTY: ICD-10-CM

## 2024-12-04 DIAGNOSIS — E78.2 MIXED HYPERLIPIDEMIA: ICD-10-CM

## 2024-12-04 DIAGNOSIS — I10 ESSENTIAL HYPERTENSION: ICD-10-CM

## 2024-12-04 DIAGNOSIS — R26.81 GAIT INSTABILITY: ICD-10-CM

## 2024-12-04 DIAGNOSIS — Z91.81 AT MODERATE RISK FOR FALL: ICD-10-CM

## 2024-12-04 PROCEDURE — 1125F AMNT PAIN NOTED PAIN PRSNT: CPT | Performed by: FAMILY MEDICINE

## 2024-12-04 PROCEDURE — 99213 OFFICE O/P EST LOW 20 MIN: CPT | Performed by: FAMILY MEDICINE

## 2024-12-04 PROCEDURE — 3077F SYST BP >= 140 MM HG: CPT | Performed by: FAMILY MEDICINE

## 2024-12-04 PROCEDURE — G0439 PPPS, SUBSEQ VISIT: HCPCS | Performed by: FAMILY MEDICINE

## 2024-12-04 PROCEDURE — 1159F MED LIST DOCD IN RCRD: CPT | Performed by: FAMILY MEDICINE

## 2024-12-04 PROCEDURE — 3078F DIAST BP <80 MM HG: CPT | Performed by: FAMILY MEDICINE

## 2024-12-04 PROCEDURE — 1160F RVW MEDS BY RX/DR IN RCRD: CPT | Performed by: FAMILY MEDICINE

## 2024-12-04 NOTE — LETTER
2024      RE: Kerrie Tyler,  1945      To whom it may concern,    I am writing to you as primary care physician for . Kerrie Tyler. As I understand it, he and his wife are anxiously awaiting visa application review for their son, who is currently a resident of [place]. 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 He has recently become more frail because of these issues and I worry that he will continue to suffer adverse effects from his chronic illnesses in the coming years. He is understandably desperate to see his son given his failing health.    He has longstanding diabetes, hypertension, high cholesterol, and coronary artery disease. He is no longer surgical candidate for any cardiovascular interventions. He is maximally medically managed though continues to suffer as above. 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    If any further information is needed to help facilitate this request, please contact me or my office.    Regards,    NYA Sepulveda MD

## 2024-12-04 NOTE — PROGRESS NOTES
Subjective   The ABCs of the Annual Wellness Visit  Medicare Wellness Visit      Kerrie Tyler is a 79 y.o. patient who presents for a Medicare Wellness Visit.    Here today for above. Due for some routine blood work. Overdue for an A1c check as well.    The following portions of the patient's history were reviewed and   updated as appropriate: allergies, current medications, past family history, past medical history, past social history, past surgical history, and problem list.    Compared to one year ago, the patient's physical   health is worse.  Compared to one year ago, the patient's mental   health is the same.    Recent Hospitalizations:  He was not admitted to the hospital during the last year.     Current Medical Providers:  Patient Care Team:  Mayito Sepulveda MD as PCP - General (Family Medicine)    Outpatient Medications Prior to Visit   Medication Sig Dispense Refill    aspirin 81 MG EC tablet Take 1 tablet by mouth Daily. 30 tablet 11    Cholecalciferol (Vitamin D) 50 MCG (2000 UT) capsule Take 1 capsule by mouth Daily. 90 capsule 3    glucose monitor monitoring kit 1 each Daily. 1 each 1    metFORMIN ER (GLUCOPHAGE-XR) 500 MG 24 hr tablet Take 1 tablet by mouth 2 (Two) Times a Day. 180 tablet 3    metoprolol tartrate (LOPRESSOR) 25 MG tablet TAKE HALF A TABLET BY MOUTH TWICE DAILY 180 tablet 3    rosuvastatin (CRESTOR) 10 MG tablet TAKE 1 TABLET BY MOUTH DAILY 90 tablet 3    tamsulosin (FLOMAX) 0.4 MG capsule 24 hr capsule Take 1 capsule by mouth Daily. 90 capsule 3    tobramycin-dexAMETHasone (TOBRADEX) 0.3-0.1 % ophthalmic suspension Apply 1 drop to eye(s) as directed by provider.      trandolapril (MAVIK) 4 MG tablet TAKE 1 TABLET BY MOUTH TWICE DAILY 180 tablet 1    glucose blood (Accu-Chek Guide) test strip 1 each by Other route 3 (Three) Times a Day. Use as instructed 300 each 2    bacitracin 500 UNIT/GM ointment Apply 1 application topically to the appropriate area as directed 2 (Two)  "Times a Day. (Patient not taking: Reported on 12/4/2024) 14 g 0    nitroglycerin (Nitrostat) 0.4 MG SL tablet Place 1 tablet under the tongue Every 5 (Five) Minutes As Needed for Chest Pain. Take no more than 3 doses in 15 minutes. (Patient not taking: Reported on 12/4/2024) 25 tablet 12    Omega-3 Fatty Acids (fish oil) 1000 MG capsule capsule Take 1 capsule by mouth Daily With Breakfast. (Patient not taking: Reported on 12/4/2024) 90 capsule 3     No facility-administered medications prior to visit.     No opioid medication identified on active medication list. I have reviewed chart for other potential  high risk medication/s and harmful drug interactions in the elderly.    Aspirin is on active medication list. Aspirin use is indicated based on review of current medical condition/s. Pros and cons of this therapy have been discussed today. Benefits of this medication outweigh potential harm.  Patient has been encouraged to continue taking this medication.  .      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  ACP discussion was held with the patient during this visit. Patient does not have an advance directive, information provided.      Objective   Vitals:    12/04/24 1614   BP: 160/62   Pulse: 72   Resp: 16   SpO2: 97%   Weight: 69.3 kg (152 lb 11.2 oz)   Height: 167.6 cm (66\")   PainSc:   8   PainLoc: Generalized     Estimated body mass index is 24.65 " "kg/m² as calculated from the following:    Height as of this encounter: 167.6 cm (66\").    Weight as of this encounter: 69.3 kg (152 lb 11.2 oz).    BMI is within normal parameters. No other follow-up for BMI required.     Does the patient have evidence of cognitive impairment? No                                                                               Health  Risk Assessment    Smoking Status:  Social History     Tobacco Use   Smoking Status Former    Current packs/day: 0.00    Average packs/day: 0.5 packs/day for 12.0 years (6.0 ttl pk-yrs)    Types: Cigarettes    Start date: 1982    Quit date: 1994    Years since quittin.9   Smokeless Tobacco Never     Alcohol Consumption:  Social History     Substance and Sexual Activity   Alcohol Use Never       Fall Risk Screen  STEADI Fall Risk Assessment was completed, and patient is at HIGH risk for falls. Assessment completed on:2024    Depression Screening   Little interest or pleasure in doing things? Not at all   Feeling down, depressed, or hopeless? Not at all   PHQ-2 Total Score 0      Health Habits and Functional and Cognitive Screenin/4/2024     4:21 PM   Functional & Cognitive Status   Do you have difficulty preparing food and eating? No   Do you have difficulty bathing yourself, getting dressed or grooming yourself? Yes   Do you have difficulty using the toilet? No   Do you have difficulty moving around from place to place? Yes   Do you have trouble with steps or getting out of a bed or a chair? Yes   Current Diet Well Balanced Diet   Dental Exam Up to date   Eye Exam Not up to date   Exercise (times per week) 7 times per week   Current Exercises Include Stationary Bicycling/Spin Class;Other        Exercise Comment lower body bike machine   Do you need help using the phone?  No   Are you deaf or do you have serious difficulty hearing?  Yes   Do you need help to go to places out of walking distance? Yes   Do you need help shopping? " Yes   Do you need help preparing meals?  Yes   Do you need help with housework?  Yes   Do you need help with laundry? Yes   Do you need help taking your medications? No   Do you need help managing money? No   Do you ever drive or ride in a car without wearing a seat belt? No   Have you felt unusual stress, anger or loneliness in the last month? Yes   Who do you live with? Spouse   If you need help, do you have trouble finding someone available to you? No   Have you been bothered in the last four weeks by sexual problems? No   Do you have difficulty concentrating, remembering or making decisions? No         Age-appropriate Screening Schedule:  Refer to the list below for future screening recommendations based on patient's age, sex and/or medical conditions. Orders for these recommended tests are listed in the plan section. The patient has been provided with a written plan.    Health Maintenance List  Health Maintenance   Topic Date Due    DIABETIC FOOT EXAM  Never done    RSV Vaccine - Adults (1 - 1-dose 75+ series) Never done    HEMOGLOBIN A1C  02/25/2023    COVID-19 Vaccine (5 - 2024-25 season) 09/01/2024    LIPID PANEL  02/28/2025    DIABETIC EYE EXAM  04/09/2025    ANNUAL WELLNESS VISIT  12/04/2025    TDAP/TD VACCINES (2 - Td or Tdap) 03/06/2033    INFLUENZA VACCINE  Completed    Pneumococcal Vaccine 65+  Completed    ZOSTER VACCINE  Completed                                                                                                                                             CMS Preventative Services Quick Reference  Risk Factors Identified During Encounter  Immunizations Discussed/Encouraged: COVID19    The above risks/problems have been discussed with the patient.  Pertinent information has been shared with the patient in the After Visit Summary.  An After Visit Summary and PPPS were made available to the patient.    Follow Up:   Next Medicare Wellness visit to be scheduled in 1 year.    Additional E&M  "Note during same encounter follows:  Patient has additional, significant, and separately identifiable condition(s)/problem(s) that require work above and beyond the Medicare Wellness Visit     Chief Complaint  Balance Issues    Subjective   HPI  Kerrie is also being seen today for additional medical problem/s.    Has ongoing balance problems that do not seem to be resolving despite physical therapy and other conservative measures. Has a hard time getting up out of a chair without several attempts. Often has to use his upper extremities to help. Has dizziness with position changes while standing and walking. Asking for an order for a cane today. Also needs an order for test trips.    Has longstanding diabetes, hypertension, lipidemia, and coronary artery disease. Health seems to be getting worse overall. Admits that he feels old for the first time in a while. Remains very concerned that he has not been able to see his son who remains in [place] waiting visa application review. Hoping that I might write a letter in support.    Objective   Vital Signs:  /62   Pulse 72   Resp 16   Ht 167.6 cm (66\")   Wt 69.3 kg (152 lb 11.2 oz)   SpO2 97%   BMI 24.65 kg/m²   Physical Exam  Vitals and nursing note reviewed.   Constitutional:       General: He is not in acute distress.     Appearance: Normal appearance. He is not ill-appearing.      Comments: More frail appearing than usual.   Cardiovascular:      Rate and Rhythm: Normal rate and regular rhythm.      Pulses: Normal pulses.      Heart sounds: Normal heart sounds. No murmur heard.  Pulmonary:      Effort: Pulmonary effort is normal. No respiratory distress.      Breath sounds: Normal breath sounds. No rales.   Musculoskeletal:      Comments: Difficulty with sit-to-stand, requires several attempts to get up out of his chair.   Neurological:      Mental Status: He is alert and oriented to person, place, and time. Mental status is at baseline.   Psychiatric:         " Mood and Affect: Mood normal.         Behavior: Behavior normal.       Assessment and Plan      Routine health maintenance    Type 2 diabetes mellitus without complication, without long-term current use of insulin      Orders:    Comprehensive Metabolic Panel    Hemoglobin A1c    glucose blood (Accu-Chek Guide) test strip; 1 each by Other route 3 (Three) Times a Day. Use as instructed    Mixed hyperlipidemia       Orders:    Comprehensive Metabolic Panel    Lipid Panel    Essential hypertension      Orders:    Comprehensive Metabolic Panel    Gait instability    Orders:    Cane  Quad or Three Prong Cane with Tips    Frailty    At moderate risk for fall    Orders:    Cane  Quad or Three Prong Cane with Tips       Orders as above. I will contact him with lab results as available. Orders for cane and test trips as requested.    Encouraged to continue working on healthy lifestyle habits. Recommended follow-up as below. Encouraged communication via [employer] in the meantime.       Follow Up   No follow-ups on file.  Patient was given instructions and counseling regarding his condition or for health maintenance advice. Please see specific information pulled into the AVS if appropriate.    Prevention counseling performed as below: Mindfulness for stress management.

## 2024-12-04 NOTE — ASSESSMENT & PLAN NOTE
Orders:    Comprehensive Metabolic Panel    Hemoglobin A1c    glucose blood (Accu-Chek Guide) test strip; 1 each by Other route 3 (Three) Times a Day. Use as instructed

## 2024-12-06 ENCOUNTER — CLINICAL SUPPORT (OUTPATIENT)
Dept: FAMILY MEDICINE CLINIC | Facility: CLINIC | Age: 79
End: 2024-12-06
Payer: MEDICARE

## 2024-12-06 DIAGNOSIS — Z23 NEED FOR VACCINATION: Primary | ICD-10-CM

## 2024-12-06 NOTE — PATIENT INSTRUCTIONS
Mindfulness apps: Headspace, Smiling Mind, Galloway!    Mindfulness-Based Stress Reduction  Mindfulness-based stress reduction (MBSR) is a program that helps people learn to practice mindfulness. Mindfulness is the practice of intentionally paying attention to the present moment. It can be learned and practiced through techniques such as education, breathing exercises, meditation, and yoga. MBSR includes several mindfulness techniques in one program.  MBSR works best when you understand the treatment, are willing to try new things, and can commit to spending time practicing what you learn. MBSR training may include learning about:  How your emotions, thoughts, and reactions affect your body.  New ways to respond to things that cause negative thoughts to start (triggers).  How to notice your thoughts and let go of them.  Practicing awareness of everyday things that you normally do without thinking.  The techniques and goals of different types of meditation.  What are the benefits of MBSR?  MBSR can have many benefits, which include helping you to:  Develop self-awareness. This refers to knowing and understanding yourself.  Learn skills and attitudes that help you to participate in your own health care.  Learn new ways to care for yourself.  Be more accepting about how things are, and let things go.  Be less judgmental and approach things with an open mind.  Be patient with yourself and trust yourself more.  MBSR has also been shown to:  Reduce negative emotions, such as depression and anxiety.  Improve memory and focus.  Change how you sense and approach pain.  Boost your body's ability to fight infections.  Help you connect better with other people.  Improve your sense of well-being.  Follow these instructions at home:    Find a local in-person or online MBSR program.  Set aside some time regularly for mindfulness practice.  Find a mindfulness practice that works best for you. This may include one or more of the  following:  Meditation. Meditation involves focusing your mind on a certain thought or activity.  Breathing awareness exercises. These help you to stay present by focusing on your breath.  Body scan. For this practice, you lie down and pay attention to each part of your body from head to toe. You can identify tension and soreness and intentionally relax parts of your body.  Yoga. Yoga involves stretching and breathing, and it can improve your ability to move and be flexible. It can also provide an experience of testing your body's limits, which can help you release stress.  Mindful eating. This way of eating involves focusing on the taste, texture, color, and smell of each bite of food. Because this slows down eating and helps you feel full sooner, it can be an important part of a weight-loss plan.  Find a podcast or recording that provides guidance for breathing awareness, body scan, or meditation exercises. You can listen to these any time when you have a free moment to rest without distractions.  Follow your treatment plan as told by your health care provider. This may include taking regular medicines and making changes to your diet or lifestyle as recommended.  How to practice mindfulness  To do a basic awareness exercise:  Find a comfortable place to sit.  Pay attention to the present moment. Observe your thoughts, feelings, and surroundings just as they are.  Avoid placing judgment on yourself, your feelings, or your surroundings. Make note of any judgment that comes up, and let it go.  Your mind may wander, and that is okay. Make note of when your thoughts drift, and return your attention to the present moment.  To do basic mindfulness meditation:  Find a comfortable place to sit. This may include a stable chair or a firm floor cushion.  Sit upright with your back straight. Let your arms fall next to your side with your hands resting on your legs.  If sitting in a chair, rest your feet flat on the floor.  If  sitting on a cushion, cross your legs in front of you.  Keep your head in a neutral position with your chin dropped slightly. Relax your jaw and rest the tip of your tongue on the roof of your mouth. Drop your gaze to the floor. You can close your eyes if you like.  Breathe normally and pay attention to your breath. Feel the air moving in and out of your nose. Feel your belly expanding and relaxing with each breath.  Your mind may wander, and that is okay. Make note of when your thoughts drift, and return your attention to your breath.  Avoid placing judgment on yourself, your feelings, or your surroundings. Make note of any judgment or feelings that come up, let them go, and bring your attention back to your breath.  When you are ready, lift your gaze or open your eyes. Pay attention to how your body feels after the meditation.  Where to find more information  You can find more information about MBSR from:  Your health care provider.  Community-based meditation centers or programs.  Programs offered near you.  Summary  Mindfulness-based stress reduction (MBSR) is a program that teaches you how to intentionally pay attention to the present moment. It is used with other treatments to help you cope better with daily stress, emotions, and pain.  MBSR focuses on developing self-awareness, which allows you to respond to life stress without judgment or negative emotions.  MBSR programs may involve learning different mindfulness practices, such as breathing exercises, meditation, yoga, body scan, or mindful eating. Find a mindfulness practice that works best for you, and set aside time for it on a regular basis.  This information is not intended to replace advice given to you by your health care provider. Make sure you discuss any questions you have with your health care provider.  Document Released: 04/26/2018 Document Revised: 11/30/2018 Document Reviewed: 04/26/2018  Elsevier Patient Education © 2020 [employer]

## 2024-12-07 LAB
ALBUMIN SERPL-MCNC: 4.4 G/DL (ref 3.8–4.8)
ALP SERPL-CCNC: 68 IU/L (ref 44–121)
ALT SERPL-CCNC: 15 IU/L (ref 0–44)
AST SERPL-CCNC: 16 IU/L (ref 0–40)
BILIRUB SERPL-MCNC: 0.4 MG/DL (ref 0–1.2)
BUN SERPL-MCNC: 13 MG/DL (ref 8–27)
BUN/CREAT SERPL: 12 (ref 10–24)
CALCIUM SERPL-MCNC: 10.1 MG/DL (ref 8.6–10.2)
CHLORIDE SERPL-SCNC: 99 MMOL/L (ref 96–106)
CHOLEST SERPL-MCNC: 175 MG/DL (ref 100–199)
CO2 SERPL-SCNC: 27 MMOL/L (ref 20–29)
CREAT SERPL-MCNC: 1.06 MG/DL (ref 0.76–1.27)
EGFRCR SERPLBLD CKD-EPI 2021: 71 ML/MIN/1.73
GLOBULIN SER CALC-MCNC: 2.7 G/DL (ref 1.5–4.5)
GLUCOSE SERPL-MCNC: 174 MG/DL (ref 70–99)
HBA1C MFR BLD: 6 % (ref 4.8–5.6)
HDLC SERPL-MCNC: 54 MG/DL
LDLC SERPL CALC-MCNC: 107 MG/DL (ref 0–99)
POTASSIUM SERPL-SCNC: 4.1 MMOL/L (ref 3.5–5.2)
PROT SERPL-MCNC: 7.1 G/DL (ref 6–8.5)
SODIUM SERPL-SCNC: 138 MMOL/L (ref 134–144)
TRIGL SERPL-MCNC: 75 MG/DL (ref 0–149)
VLDLC SERPL CALC-MCNC: 14 MG/DL (ref 5–40)

## 2025-01-14 ENCOUNTER — TELEPHONE (OUTPATIENT)
Dept: FAMILY MEDICINE CLINIC | Facility: CLINIC | Age: 80
End: 2025-01-14
Payer: MEDICARE

## 2025-01-14 NOTE — TELEPHONE ENCOUNTER
Caller: CHARLYMAADELFOEDDIE    Relationship: Emergency Contact    Best call back number: [phone]    What is the medical concern/diagnosis:   LOSING BALANCE, LEFT HAND, SLOW MOVEMENT, REDUCED VOCAL VOLUME, SHORT GATE AND REDUCED ARM SWINGS.     What specialty or service is being requested: NEUROLOGIST    What is the provider, practice or medical service name: [facility]      What is the office location: [place]     What is the office phone number: [place]OVE     Any additional details:     PATIENT'S SON CALLED IN REQUESTING THIS REFERRAL REQUEST TO A NEUROLOGIST BECAUSE OF PATIENT'S SYMPTOMS.     PLEASE CALL SON TO DISCUSS/ CONFIRM OR DENY.

## 2025-01-15 DIAGNOSIS — R29.818 PARKINSONIAN FEATURES: ICD-10-CM

## 2025-01-15 DIAGNOSIS — R26.81 GAIT INSTABILITY: Primary | ICD-10-CM

## 2025-01-28 ENCOUNTER — OFFICE VISIT (OUTPATIENT)
Dept: NEUROLOGY | Facility: CLINIC | Age: 80
End: 2025-01-28
Payer: MEDICARE

## 2025-01-28 VITALS
HEART RATE: 64 BPM | BODY MASS INDEX: 25.02 KG/M2 | OXYGEN SATURATION: 99 % | SYSTOLIC BLOOD PRESSURE: 158 MMHG | WEIGHT: 155 LBS | DIASTOLIC BLOOD PRESSURE: 68 MMHG

## 2025-01-28 DIAGNOSIS — G20.A1 PARKINSON'S DISEASE WITHOUT DYSKINESIA OR FLUCTUATING MANIFESTATIONS: Primary | ICD-10-CM

## 2025-01-28 PROCEDURE — 99205 OFFICE O/P NEW HI 60 MIN: CPT | Performed by: PSYCHIATRY & NEUROLOGY

## 2025-01-28 PROCEDURE — 3078F DIAST BP <80 MM HG: CPT | Performed by: PSYCHIATRY & NEUROLOGY

## 2025-01-28 PROCEDURE — 3077F SYST BP >= 140 MM HG: CPT | Performed by: PSYCHIATRY & NEUROLOGY

## 2025-01-28 PROCEDURE — 1160F RVW MEDS BY RX/DR IN RCRD: CPT | Performed by: PSYCHIATRY & NEUROLOGY

## 2025-01-28 PROCEDURE — 1159F MED LIST DOCD IN RCRD: CPT | Performed by: PSYCHIATRY & NEUROLOGY

## 2025-01-28 RX ORDER — CARBIDOPA AND LEVODOPA 25; 100 MG/1; MG/1
TABLET ORAL
Qty: 90 TABLET | Refills: 5 | Status: SHIPPED | OUTPATIENT
Start: 2025-01-28

## 2025-01-28 RX ORDER — FINASTERIDE 5 MG/1
1 TABLET, FILM COATED ORAL DAILY
COMMUNITY
Start: 2025-01-23

## 2025-01-28 NOTE — PROGRESS NOTES
CC: Gait change    HPI:  Kerrie Tyler is a  79 y.o.  right-handed white male who was sent for neurological consultation by Dr. Sepulveda regarding a change in his gait.  The patient is accompanied by his son I believe who adds to the history.  The patient states that in May of last year he had COVID and then afterwards he feels that his steps have slowly been getting shorter and that he has developed reduced voice projection and slowed movements along with some resting tremor of the left hand.  The patient denies any family history of a similar finding.  He is a retired ENT.  He has a brother who is a retired neurologist with a stroke causing expressive aphasia.    Patient states that his memory is sharp and they denies any visual hallucinations.  He denies numbness or tingling in the hands or feet.  He has history of a fall 8/2023 landing on his face without loss of consciousness.  He states that a head CT was obtained which was negative for a bleed.  About 2 months later he fell again this time it was at home at night with fracture of the right tibia.        Past Medical History:   Diagnosis Date    Allergic     Coronary artery disease 1994    CABAG    Diabetes mellitus 2012    Type2. Controlled    Kidney stone     Myocardial infarction          Past Surgical History:   Procedure Laterality Date    CARDIAC CATHETERIZATION N/A 08/24/2022    Procedure: Left Heart Cath  FEMORAL ACCESS;  Surgeon: Jam Gavin MD;  Location: [facility] INVASIVE LOCATION;  Service: Cardiovascular;  Laterality: N/A;  Use femoral access- patient has bilateral CAMERON's with previous CABG    CARDIAC CATHETERIZATION N/A 08/24/2022    Procedure: Left ventriculography;  Surgeon: Jam Gavin MD;  Location: [facility] INVASIVE LOCATION;  Service: Cardiovascular;  Laterality: N/A;    CARDIAC CATHETERIZATION N/A 08/24/2022    Procedure: Native mammary injection;  Surgeon: Jam Gavin MD;  Location: [facility] INVASIVE LOCATION;   Service: Cardiovascular;  Laterality: N/A;    CARDIAC CATHETERIZATION  08/24/2022    Procedure: RESTING FULL CYCLE RATIO;  Surgeon: Jam Gavin MD;  Location:  [facility];  Service: Cardiovascular;;    CARDIAC CATHETERIZATION N/A 08/24/2022    Procedure: Stent LIZBET bypass graft;  Surgeon: Jam Gavin MD;  Location:  [facility];  Service: Cardiovascular;  Laterality: N/A;    CARDIAC CATHETERIZATION N/A 08/24/2022    Procedure: Coronary angiography;  Surgeon: Jam Gavin MD;  Location:  [facility];  Service: Cardiovascular;  Laterality: N/A;    CARDIAC CATHETERIZATION N/A 08/24/2022    Procedure: Percutaneous Coronary Intervention;  Surgeon: Jam Gavin MD;  Location:  [facility];  Service: Cardiovascular;  Laterality: N/A;    CARDIAC SURGERY  1994    CATARACT EXTRACTION, BILATERAL Bilateral 1995    CORONARY ARTERY BYPASS GRAFT  1994    3 vessel    CORONARY STENT PLACEMENT  August 2022    EXTRACORPOREAL SHOCK WAVE LITHOTRIPSY (ESWL) Bilateral 2004    EYE SURGERY  1995    TONSILLECTOMY             Current Outpatient Medications:     finasteride (PROSCAR) 5 MG tablet, Take 1 tablet by mouth Daily., Disp: , Rfl:     aspirin 81 MG EC tablet, Take 1 tablet by mouth Daily., Disp: 30 tablet, Rfl: 11    carbidopa-levodopa (SINEMET)  MG per tablet, 1/2 tab 3 times daily for 1 week then 1 tab 3 times daily, Disp: 90 tablet, Rfl: 5    glucose blood (Accu-Chek Guide) test strip, 1 each by Other route 3 (Three) Times a Day. Use as instructed, Disp: 270 each, Rfl: 3    glucose monitor monitoring kit, 1 each Daily., Disp: 1 each, Rfl: 1    metFORMIN ER (GLUCOPHAGE-XR) 500 MG 24 hr tablet, Take 1 tablet by mouth 2 (Two) Times a Day., Disp: 180 tablet, Rfl: 3    metoprolol tartrate (LOPRESSOR) 25 MG tablet, TAKE HALF A TABLET BY MOUTH TWICE DAILY, Disp: 180 tablet, Rfl: 3    rosuvastatin (CRESTOR) 10 MG tablet, TAKE 1 TABLET BY MOUTH  DAILY, Disp: 90 tablet, Rfl: 3    tamsulosin (FLOMAX) 0.4 MG capsule 24 hr capsule, Take 1 capsule by mouth Daily., Disp: 90 capsule, Rfl: 3    trandolapril (MAVIK) 4 MG tablet, TAKE 1 TABLET BY MOUTH TWICE DAILY, Disp: 180 tablet, Rfl: 1      Family History   Problem Relation Age of Onset    Heart attack Mother              Diabetes Father     Heart disease Father         [id number]    Hypertension Father     Hyperlipidemia Father     Hypertension Sister     Hyperlipidemia Sister     Heart disease Sister         CABAG     Diabetes Brother     Heart disease Brother         Also Bypass     Hypertension Brother     Hyperlipidemia Brother     Prostate cancer Neg Hx          Social History     Socioeconomic History    Marital status:    Tobacco Use    Smoking status: Former     Current packs/day: 0.00     Average packs/day: 0.5 packs/day for 12.0 years (6.0 ttl pk-yrs)     Types: Cigarettes     Start date: 1982     Quit date: 1994     Years since quittin.0    Smokeless tobacco: Never   Vaping Use    Vaping status: Never Used   Substance and Sexual Activity    Alcohol use: Never    Drug use: Never    Sexual activity: Not Currently     Partners: Female         Allergies   Allergen Reactions    Sulfa Antibiotics Rash, Itching, Unknown - Low Severity and Other (See Comments)    Trimethoprim Rash         Pain Scale: 0/10 currently        ROS:  Review of Systems   Constitutional:  Positive for fatigue.   Musculoskeletal:  Positive for gait problem.   Neurological:  Positive for tremors, speech difficulty and weakness. Negative for dizziness, seizures, syncope, facial asymmetry, light-headedness, numbness and headaches.   Psychiatric/Behavioral:  Positive for sleep disturbance. Negative for agitation, behavioral problems, confusion, decreased concentration, dysphoric mood, hallucinations, self-injury and suicidal ideas. The patient is not nervous/anxious and is not hyperactive.          I have  reviewed and agree with the above ROS completed by the medical assistant.      Physical Exam:  Vitals:    01/28/25 1343   BP: 158/68   Pulse: 64   SpO2: 99%   Weight: 70.3 kg (155 lb)     Orthostatic BP:    Body mass index is 25.02 kg/m².    Physical Exam  General: Well-developed white male no acute distress  HEENT: Normocephalic no evidence of trauma.  Throat negative  Neck: Supple.  No thyromegaly.  No cervical bruits  Heart: Irregularly irregular rhythm.  No murmurs.  No pedal edema  Extremities: Radial pulses strong and simultaneous      Neurological Exam:   Mental Status: Awake, alert, oriented to person, place and time.  Conversant without evidence of an affective disorder, thought disorder, delusions or hallucinations.  Attention span and concentration are normal.  HCF: No aphasia, apraxia or dysarthria.  Recent and remote memory intact.  Knowledge of recent events intact.  CN: I:   II: Visual fields full without left inattention   III, IV, VI: Eye movements intact without nystagmus or ptosis.  Pupils equal  round and reactive to light.   V,VII: Light touch and pinprick intact all 3 divisions of V.  Facial muscles symmetrical.   VIII: Hearing reduced but intact to finger rub   IX,X: Soft palate elevates symmetrically   XI: Sternomastoid and trapezius are strong.   XII: Tongue midline without atrophy or fasciculations  Motor: Cogwheel tone in the upper extremities with normal tone at the knees and normal bulk in the upper and lower extremities   Power testing: Full power in all muscles tested arms and legs  Reflexes: Upper extremities: +2 diffusely        Lower extremities: +2 diffusely        Toe signs: Downgoing bilaterally  Sensory: Light touch: Diffusely intact arms and legs        Pinprick: Diffusely intact arms and legs        Vibration: Intact at the ankles        Position: Intact at the great toes    Cerebellar: Finger-to-nose: Some resting pill-rolling tremor seen in the left hand occasionally.   "Finger-to-nose shows bradykinesia bilaterally           Rapid movement: Finger tapping was pretty rapid           Heel-to-shin: Slow  Gait and Station: Comes to stand with some effort.  Has to push himself up with his arms.  Short steps are prominent with many steps taken to turn around.  While ambulating some tremor develops in the left hand.  No Romberg or drift    Results:      Lab Results   Component Value Date    GLUCOSE 174 (H) 12/06/2024    BUN 13 12/06/2024    CREATININE 1.06 12/06/2024    EGFRIFNONA 83 09/14/2021    EGFRIFAFRI 96 09/14/2021    BCR 12 12/06/2024    CO2 27 12/06/2024    CALCIUM 10.1 12/06/2024    PROTENTOTREF 7.1 12/06/2024    ALBUMIN 4.4 12/06/2024    LABIL2 1.7 09/14/2021    AST 16 12/06/2024    ALT 15 12/06/2024       Lab Results   Component Value Date    WBC 7.31 02/29/2024    HGB 13.6 02/29/2024    HCT 41.2 02/29/2024    MCV 86.4 02/29/2024     02/29/2024         .No results found for: \"RPR\"      Lab Results   Component Value Date    TSH 0.961 02/29/2024         No results found for: \"[id number]\"      No results found for: \"FOLATE\"      Lab Results   Component Value Date    HGBA1C 6.0 (H) 12/06/2024         Lab Results   Component Value Date    GLUCOSE 174 (H) 12/06/2024    BUN 13 12/06/2024    CREATININE 1.06 12/06/2024    EGFRIFNONA 83 09/14/2021    EGFRIFAFRI 96 09/14/2021    BCR 12 12/06/2024    K 4.1 12/06/2024    CO2 27 12/06/2024    CALCIUM 10.1 12/06/2024    PROTENTOTREF 7.1 12/06/2024    ALBUMIN 4.4 12/06/2024    LABIL2 1.7 09/14/2021    AST 16 12/06/2024    ALT 15 12/06/2024         Lab Results   Component Value Date    WBC 7.31 02/29/2024    HGB 13.6 02/29/2024    HCT 41.2 02/29/2024    MCV 86.4 02/29/2024     02/29/2024             Assessment:   1.  Idiopathic Parkinson's disease          Plan:  1.  I had a lengthy discussion about the pathophysiology of Parkinson's disease with the patient and his son.  Standard treatment includes carbidopa/levodopa in patients " with moderate disease or worse.  Invasive treatments were discussed also.  Treatment side effects were discussed.  He had some concerns about increased incidence of myocardial infarction in patients with coronary artery disease and placed on carbidopa/levodopa.  2.  Will start carbidopa/levodopa, 25/100, half tablet 3 times daily preferably prior to eating by about 30 minutes for better absorption.  If he is unable to tolerate it taken this way then he can take it with his food.  If he tolerates this after a week then increase dosage to 1 tablet 3 times daily  3.  To follow-up with Marion Gaytan NP in 1 month.        Time: 60 minutes                Dictated utilizing Dragon dictation.

## 2025-01-30 ENCOUNTER — PATIENT ROUNDING (BHMG ONLY) (OUTPATIENT)
Dept: NEUROLOGY | Facility: CLINIC | Age: 80
End: 2025-01-30
Payer: MEDICARE

## 2025-01-31 DIAGNOSIS — I10 ESSENTIAL HYPERTENSION: ICD-10-CM

## 2025-01-31 RX ORDER — TRANDOLAPRIL TABLETS 4 MG/1
4 TABLET ORAL 2 TIMES DAILY
Qty: 180 TABLET | Refills: 1 | Status: SHIPPED | OUTPATIENT
Start: 2025-01-31

## 2025-02-07 ENCOUNTER — HOSPITAL ENCOUNTER (OUTPATIENT)
Dept: SPEECH THERAPY | Facility: HOSPITAL | Age: 80
Setting detail: THERAPIES SERIES
Discharge: HOME | End: 2025-02-07
Payer: MEDICARE

## 2025-02-07 ENCOUNTER — HOSPITAL ENCOUNTER (OUTPATIENT)
Dept: PHYSICAL THERAPY | Facility: HOSPITAL | Age: 80
Setting detail: THERAPIES SERIES
Discharge: HOME | End: 2025-02-07
Payer: MEDICARE

## 2025-02-07 DIAGNOSIS — G20.A1 PARKINSON'S DISEASE, UNSPECIFIED WHETHER DYSKINESIA PRESENT, UNSPECIFIED WHETHER MANIFESTATIONS FLUCTUATE: Primary | ICD-10-CM

## 2025-02-07 DIAGNOSIS — Z74.09 IMPAIRED FUNCTIONAL MOBILITY, BALANCE, GAIT, AND ENDURANCE: Primary | ICD-10-CM

## 2025-02-07 DIAGNOSIS — R47.1 DYSARTHRIA: ICD-10-CM

## 2025-02-07 DIAGNOSIS — N40.1 BENIGN PROSTATIC HYPERPLASIA WITH URINARY HESITANCY: ICD-10-CM

## 2025-02-07 DIAGNOSIS — R39.11 BENIGN PROSTATIC HYPERPLASIA WITH URINARY HESITANCY: ICD-10-CM

## 2025-02-07 DIAGNOSIS — E11.9 TYPE 2 DIABETES MELLITUS WITHOUT COMPLICATION, WITHOUT LONG-TERM CURRENT USE OF INSULIN: ICD-10-CM

## 2025-02-07 PROCEDURE — 92522 EVALUATE SPEECH PRODUCTION: CPT | Performed by: SPEECH-LANGUAGE PATHOLOGIST

## 2025-02-07 PROCEDURE — 97162 PT EVAL MOD COMPLEX 30 MIN: CPT

## 2025-02-07 RX ORDER — TAMSULOSIN HYDROCHLORIDE 0.4 MG/1
1 CAPSULE ORAL DAILY
Qty: 90 CAPSULE | Refills: 3 | Status: SHIPPED | OUTPATIENT
Start: 2025-02-07

## 2025-02-07 RX ORDER — METFORMIN HYDROCHLORIDE 500 MG/1
500 TABLET, EXTENDED RELEASE ORAL 2 TIMES DAILY
Qty: 180 TABLET | Refills: 3 | Status: SHIPPED | OUTPATIENT
Start: 2025-02-07

## 2025-02-07 NOTE — THERAPY TREATMENT NOTE
"  Outpatient Physical Therapy Neuro Treatment Note  [facility]     Patient Name: Kerrie Tyler  : 1945  MRN: [id number]  Today's Date: 2025      Visit Date: 2025    Visit Dx:    ICD-10-CM ICD-9-CM   1. Impaired functional mobility, balance, gait, and endurance  Z74.09 V49.89       Patient Active Problem List   Diagnosis    Type 2 diabetes mellitus without complication, without long-term current use of insulin    Mixed hyperlipidemia    Essential hypertension    Coronary artery disease of bypass graft of native heart with stable angina pectoris    S/P CABG x 3    Benign prostatic hyperplasia with urinary hesitancy    Vitamin D deficiency    Presbycusis of both ears    Diabetic retinopathy associated with type 2 diabetes mellitus    Acute chest pain    Status post insertion of drug eluting coronary artery stent    Chest pain            PT Neuro       Row Name 25 1305             Subjective    Subjective Comments \"I told my family I had Parkinsons before the doctor did \"  Pt noted that he realized his diagnosis based off of sx.  He reports that he used to be more active - walking outside daily  - but has slowed down in the last year,  -KP         Precautions and Contraindications    Precautions/Limitations fall precautions  -KP         Subjective Pain    Able to rate subjective pain? yes  -KP      Subjective Pain Comment denies pain at timeof eval  -KP         Home Living    Current Living Arrangements home  -KP         Vision-Basic Assessment    Current Vision No visual deficits  -KP         Cognition    Overall Cognitive Status WFL  -KP      Arousal/Alertness Appropriate responses to stimuli  -KP      Memory Appears intact  -KP      Orientation Level Oriented X4  -KP      Safety Judgment Good awareness of safety precautions  -KP      Deficits Fully aware of deficits  -KP         Sensation    Sensation WNL? WNL  -KP         Proprioception    Proprioception WNL  -KP         Perception    " Inattention/Neglect Appears intact  -KP      Initiation Appears intact  -KP         Posture/Observations    Alignment Options Forward head;Rounded shoulders;Thoracic kyphosis  -KP      Forward Head Mild;Moderate  -KP      Thoracic Kyphosis Moderate  -KP      Rounded Shoulders Moderate  -KP      Posture/Observations Comments AMb to PT with spouse at side.  No AD. 5 min late due to incorrect location  -KP         Coordination    Coordination Tests Rapid Alternating  -KP      Rapid Alternating Intact  but slower  -KP         Gross Motor Coordination Training    Coordination WFL  -KP         General ROM    GENERAL ROM COMMENTS AROM grossly 75-85%  -KP         MMT (Manual Muscle Testing)    Rt Lower Ext Rt Hip Flexion;Rt Hip Extension;Rt Hip ABduction;Rt Knee WFL;Rt Ankle WFL  -KP      Lt Lower Ext Lt Hip Flexion;Lt Hip Extension;Lt Hip ABduction;Lt Knee WFL;Lt Ankle WFL  -KP         MMT Right Lower Ext    Rt Hip Flexion MMT, Gross Movement (4/5) good  -KP      Rt Hip Extension MMT, Gross Movement (4/5) good  -KP      Rt Hip ABduction MMT, Gross Movement (4-/5) good minus  -KP         MMT Left Lower Ext    Lt Hip Flexion MMT, Gross Movement (4/5) good  -KP      Lt Hip Extension MMT, Gross Movement (4/5) good  -KP      Lt Hip ABduction MMT, Gross Movement (4-/5) good minus  -KP         Bed Mobility    Bed Mobility rolling left;rolling right;sit-supine;supine-sit  -KP      Rolling Left [place] (Bed Mobility) independent  -KP      Rolling Right [place] (Bed Mobility) independent  -KP      Supine-Sit [place] (Bed Mobility) independent  -KP      Sit-Supine [place] (Bed Mobility) independent  -KP      Bed Mobility, Safety Issues decreased use of arms for pushing/pulling;decreased use of legs for bridging/pushing  -KP      Comment, (Bed Mobility) labored and inefficient.  Pain with rolling to L.  -KP         Transfers    Bed-Chair [place] (Transfers) modified independence  -KP      Chair-Bed  [place] (Transfers) modified independence  -      Sit-Stand [place] (Transfers) modified independence  -KP      Stand-Sit [place] (Transfers) modified independence  -KP      Transfer, Safety Issues weight-shifting ability decreased  -KP      Transfer, Impairments strength decreased;impaired balance  -      Comment, (Transfers) dec fwd WS, wide NOEL, relies on UEs  -KP         Gait/Stairs (Locomotion)    [place] Level (Gait) modified independence  -      Distance in Feet (Gait) 150  120  -KP      Pattern (Gait) step-through  -KP      Deviations/Abnormal Patterns (Gait) ace decreased;gait speed decreased;stride length decreased;weight shifting decreased  -KP      Bilateral Gait Deviations decreased arm swing;heel strike decreased  dec foot clearance B, dec push off  -KP      Gait Assessment/Intervention More rigid trunk, fixed pelvis  -      [place] Level (Stairs) modified [place]  -      Handrail Location (Stairs) both sides  -KP      Number of Steps (Stairs) 4  -KP      Ascending Technique (Stairs) step-over-step  -KP      Descending Technique (Stairs) step-over-step  -KP      Stairs, Safety Issues balance decreased during turns  -KP      Stairs, Impairments strength decreased;impaired balance  -         Balance Skills Training    Sitting-Level of Assistance Independent  -      Sitting-Balance Support Feet unsupported  -KP      Standing-Level of Assistance Independent  -      Static Standing Balance Support No upper extremity supported  -                User Key  (r) = Recorded By, (t) = Taken By, (c) = Cosigned By      Initials Name Provider Type     Joy Talbert, PT Physical Therapist                             PT Assessment/Plan       Row Name 02/07/25 8034          PT Assessment    Functional Limitations Decreased safety during functional activities;Impaired gait;Limitations in community activities;Limitations in functional capacity and  performance;Performance in leisure activities  -     Impairments Balance;Endurance;Gait;Impaired postural alignment;Muscle strength;Impaired muscle endurance  -     Assessment Comments Pt is an 80 yo male who presents to PT for evaluation following a new diagnosis of Parkinsons Disease.  Pt had been having gait abnormalities and falls among other sx which led to work up.  Pt has a PMH which includes CAD, CABG, DM Type2, and Kidney stone, Myocardial infarction, and a R Tibia fx which he sustained in a fall in 2023 which may impact pt POC.  Pt presents with impaired balance, parkinsonian gait abnormalities, impaired posture, dec strength, and decreased endurance all of which are limiting safety and functional indep. Pt transfers wtih Mod indep and ambulates without a device , mod Indep with numerous deviations.   Outcome measures show pt to be a min / mod fall risk based on 19.37 scored onTUG and 46 on the AVILA balance test.  Pt would benefit from skilled PT to address deficits , improve functional and independence, minimize fall risk and instruct pt on techniques to maximize potential of function with current diagnosis.  Pt and spouse in agreement with plan and goals.  -     Please refer to paper survey for additional self-reported information No  -KP     Rehab Potential Good  -KP     Patient/caregiver participated in establishment of treatment plan and goals Yes  -KP     Patient would benefit from skilled therapy intervention Yes  -KP        PT Plan    PT Frequency 2x/week  -     Predicted Duration of Therapy Intervention (PT) 6-8 weeks  -     Planned CPT's? PT EVAL MOD COMPLELITY: 78836;PT THER PROC EA 15 MIN: 16398;PT THER ACT EA 15 MIN: 60228;PT NEUROMUSC RE-EDUCATION EA 15 MIN: 28154;PT GAIT TRAINING EA 15 MIN: 41035  -               User Key  (r) = Recorded By, (t) = Taken By, (c) = Cosigned By      Initials Name Provider Type    Joy Booth, PT Physical Therapist                        OP  "Exercises       Row Name 02/07/25 1305             Subjective    Subjective Comments \"I told my family I had Parkinsons before the doctor did \"  Pt noted that he realized his diagnosis based off of sx.  He reports that he used to be more active - walking outside daily  - but has slowed down in the last year,  -         Subjective Pain    Able to rate subjective pain? yes  -      Subjective Pain Comment denies pain at timeof eval  -                User Key  (r) = Recorded By, (t) = Taken By, (c) = Cosigned By      Initials Name Provider Type    Joy Booth PT Physical Therapist                                 PT OP Goals       Row Name 02/07/25 1600          PT Short Term Goals    STG Date to Achieve 02/07/25  -     STG 1 Pt to be indep with basic HEP for PD, strength  -     STG 2 Pt to achieve STS from arm chair on first attempt 100% of attmepts.  -     STG 3 Pt to consistently demonstate forward WS in transfers to decrease fall risk and improve efficiency with mobility.  -        Long Term Goals    LTG Date to Achieve 04/07/25  -     LTG 1 Patient will be independent with proggressive HEP for PD management and balance.  -     LTG 2 Pt to improve TUG to 14 sec to demonstrate improved gait quality and dec fall risk.  -     LTG 3 Pt to improve 5 x STS to 25 seconds to demonstrate improved functional mobility and dec fall risk.  -     LTG 4 Pt to achieve STS from armless chair wtihout UEs.  -     LTG 5 Pt to achieve HS in gait B 90 % of the time.  -        Time Calculation    PT Goal Re-Cert Due Date 03/07/25  -               User Key  (r) = Recorded By, (t) = Taken By, (c) = Cosigned By      Initials Name Provider Type    Joy Booth PT Physical Therapist                    Therapy Education  Education Details: POC and therapy goals.  Given: Symptoms/condition management  Program: New  How Provided: Verbal  Provided to: Patient, Caregiver  Level of Understanding: " Verbalized, Demonstrated    Outcome Measure Options: 5x Sit to Stand, Baig Balance, Timed Up and Go (TUG)  5 Times Sit to Stand  5 Times Sit to Stand (seconds): 40 seconds  5 Times Sit to Stand Comments: definite use of UEs  Baig Balance Scale  Sitting to Standing: able to stand independently using hands  Standing Unsupported: able to stand safely for 2 minutes  Sitting with Back Unsupported but Feet Supported on Floor or on Stool: able to sit safely and securely for 2 minutes  Standing to Sitting: sits safely with minimal use of hands  Transfers: able to transfer safely with minor use of hands  Standing Unsupported with Eyes Closed: able to stand 10 seconds with supervision  Standing Unsupported with Feet Together: able to place feet together independently and stand 1 minute safely  Reaching Forward with Outstretched Arm While Standing: can reach forward 5 cm (2 inches)   Object From the Floor From a Standing Position: able to  object but needs supervision  Turning to Look Behind Over Left and Right Shoulders While Standing: looks behind from both sides and weight shifts well  Turn 360 Degrees: able to turn 360 degrees safely in 4 seconds or less  Place Alternate Foot on Step or Stool While Standing Unsupported: able to complete 4 steps without aid with supervision  Standing Unsupported with One Foot in Front: able to place foot ahead independently and hold 30 seconds  Standing on One Leg: unable to try of needs assist to prevent fall  Baig Total Score: 44  Timed Up and Go (TUG)  TUG Test 1: 21.37 seconds  TUG Test 2: 19.27 seconds      Time Calculation:   Start Time: 1250  Stop Time: 1330  Time Calculation (min): 40 min  Untimed Charges  PT Eval/Re-eval Minutes: 40  Total Minutes  Untimed Charges Total Minutes: 40   Total Minutes: 40   Therapy Charges for Today       Code Description Service Date Service Provider Modifiers Qty    [id number] HC PT EVAL MOD COMPLEXITY 4 2/7/2025 Joy Talbert, PT  GP 1            PT G-Codes  Outcome Measure Options: 5x Sit to Stand, Baig Balance, Timed Up and Go (TUG)  Baig Total Score: 44  TUG Test 1: 21.37 seconds  TUG Test 2: 19.27 seconds         Joy Talbert, PT  2/7/2025

## 2025-02-07 NOTE — THERAPY EVALUATION
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 Controlled    Kidney stone     Myocardial infarction         Past Surgical History:   Procedure Laterality Date    CARDIAC CATHETERIZATION N/A 2022    Procedure: Left Heart Cath  FEMORAL ACCESS;  Surgeon: Jam Gavin MD;  Location: [facility] CATH INVASIVE LOCATION;  Service: Cardiovascular;  Laterality: N/A;  Use femoral access- patient has bilateral CAMERON's with previous CABG    CARDIAC CATHETERIZATION N/A 2022    Procedure: Left ventriculography;  Surgeon: Jma Gavin MD;  Location: [facility] CATH INVASIVE LOCATION;  Service: Cardiovascular;  Laterality: N/A;    CARDIAC CATHETERIZATION N/A 2022    Procedure: Native mammary injection;  Surgeon: Jam Gavin MD;  Location:  [facility] CATH INVASIVE LOCATION;  Service: Cardiovascular;  Laterality: N/A;    CARDIAC CATHETERIZATION  2022    Procedure: RESTING FULL CYCLE RATIO;  Surgeon: Jam Gavin MD;  Location: [facility] C[facility]VAS[facility] LOC[facility];  Service: Cardiovascular;;    CARDIAC CATHETERIZATION N/A 2022    Procedure: Stent LIZBET bypass  graft;  Surgeon: Jam Gavin MD;  Location:  [facility] CATH INVASIVE LOCATION;  Service: Cardiovascular;  Laterality: N/A;    CARDIAC CATHETERIZATION N/A 08/24/2022    Procedure: Coronary angiography;  Surgeon: Jam Gavin MD;  Location:  [facility] CATH INVASIVE LOCATION;  Service: Cardiovascular;  Laterality: N/A;    CARDIAC CATHETERIZATION N/A 08/24/2022    Procedure: Percutaneous Coronary Intervention;  Surgeon: Jam Gavin MD;  Location:  [facility] CATH INVASIVE LOCATION;  Service: Cardiovascular;  Laterality: N/A;    CARDIAC SURGERY  1994    CATARACT EXTRACTION, BILATERAL Bilateral 1995    CORONARY ARTERY BYPASS GRAFT  1994    3 vessel    CORONARY STENT PLACEMENT  August 2022    EXTRACORPOREAL SHOCK WAVE LITHOTRIPSY (ESWL) Bilateral 2004    EYE SURGERY  1995    TONSILLECTOMY           Visit Dx:    ICD-10-CM ICD-9-CM   1. Parkinson's disease, unspecified whether dyskinesia present, unspecified whether manifestations fluctuate  G20.A1 332.0   2. Dysarthria  R47.1 784.51            OP SLP Assessment/Plan - 02/07/25 1604          SLP Assessment    Functional Problems Speech Language- Adult/Cognition  -KA    Clinical Impression: Speech Language-Adult/Congnition Mild:;Dysarthria- Hypokinetic  -KA    Prognosis Good (comment)  -KA    Patient/caregiver participated in establishment of treatment plan and goals Yes  -KA    Patient would benefit from skilled therapy intervention Yes  -KA       SLP Plan    Frequency x1 a week  -KA    Duration 4 to 8 weeks  -KA    Planned CPT's? SLP INDIVIDUAL SPEECH THERAPY: 09216  -KA    Expected Duration of Therapy Session (SLP Eval) 45  -KA              User Key  (r) = Recorded By, (t) = Taken By, (c) = Cosigned By      Initials Name Provider Type    Gilmar Larsen SLP Speech and Language Pathologist                     SLP SLC Evaluation - 02/07/25 1500          Communication Assessment/Intervention    Document Type evaluation  -KA    Subjective Information no complaints  -KA     Patient Observations alert;cooperative  -KA    Patient Effort good  -KA    Symptoms Noted During/After Treatment none  -KA       General Information    Patient Profile Reviewed yes  -KA    Pertinent History Of Current Problem Patient referred for Parkinsons disease. Patient c/o of weak voice. He reports he was diagnosed with Parkinsons disease approximately 4 months ago. He reports he has reduced volume and cannot project himself as he once did.  -KA    Precautions/Limitations, Vision WFL;for purposes of eval  -KA    Precautions/Limitations, Hearing WFL;for purposes of eval  -KA    Patient Level of Education Retired ENT MD  -KA    Prior Level of Function-Communication WFL  -KA    Plans/Goals Discussed with patient  -KA    Barriers to Rehab none identified  -KA    Patient's Goals for Discharge other (see comments)   improve voice -KA       Pain    Pretreatment Pain Rating 0/10 - no pain  -KA    Posttreatment Pain Rating 0/10 - no pain  -KA       Oral Motor Structure and Function    Oral Motor Structure and Function WNL  -KA    Dentition Assessment natural, present and adequate  -KA    Mucosal Quality moist, healthy  -KA       Oral Musculature and Cranial Nerve Assessment    Oral Motor General Assessment WFL  -KA       Motor Speech Assessment/Intervention    Motor Speech Function mild impairment  -KA    Characteristics Consistent with Dysarthria slow rate;decreased intensity  -KA    Motor Speech, Comment Patient demonstrates mild hypokinetic dysarthria characterized by decreased volume. Patient speech intelligibility is functional and 100% clear. Patient averaged MPT was 14 seconds (15 to 20 normal). Decreased loudness and volume demonstrated in pitch glides. When pt instructed to inhale deeply prior to phonation pt was able to increase volume and loudness with increased effort. SLP educated pt on EMST 150 device to increase pt respiratory strength, breath support and volume. Pt performed 5 sets of 5 breaths for total  of 25 breaths at [id number].  -KA       SLP Evaluation Clinical Impressions    SLP Diagnosis mild;dysarthria  -KA    Rehab Potential/Prognosis good  -KA    SLC Criteria for Skilled Therapy Interventions Met yes  -KA    Functional Impact difficulty communicating wants, needs;difficulty communicating in an emergency;difficulty in expressing complex messages  -KA       Recommendations    Therapy Frequency (SLP SLC) 1 day per week  -KA    Predicted Duration Therapy Intervention (Days) until discharge  -KA    Anticipated Discharge Disposition (SLP) home  -KA              User Key  (r) = Recorded By, (t) = Taken By, (c) = Cosigned By      Initials Name Provider Type    Gilmar Larsen SLP Speech and Language Pathologist                                    OP SLP Education       Row Name 02/07/25 1605       Education    Barriers to Learning No barriers identified  -KA    Education Provided Described results of evaluation;Patient expressed understanding of evaluation;Family/caregivers expressed understanding of evaluation  -KA    Assessed Learning needs;Learning motivation  -[place]    Learning Motivation Strong  -[place]    Learning Method Explanation  -KA    Teaching Response Verbalized understanding  -KA              User Key  (r) = Recorded By, (t) = Taken By, (c) = Cosigned By      Initials Name Effective Dates    Gilmar Larsen SLP 01/05/24 -                    SLP OP Goals       Row Name 02/07/25 1600          Goal Type Needed    Goal Type Needed Voice;Dysarthria  -[place]        Dysarthria Goals     Dysarthria LTG's Patient will be able to communicate a message that is comprehensible to familiar/unfamiliar listeners utilizing verbal speech and augmentative strategies  -KA     Patient will be able to communicate a message that is comprehensible to familiar/unfamiliar listeners utilizing verbal speech and augmentative strategies 90%:;without cues  -KA     Status: Patient will be able to communicate a message that is  comprehensible to familiar/unfamiliar listeners utilizing verbal speech and augmentative strategies New  -KA      Dysarthria STG's Patient will apply compensatory strategies to improve intelligibility of speech during in spontaneous speech  -KA     Patient will apply compensatory strategies to improve intelligibility of speech during in spontaneous speech 90%:;without cues  -KA     Status: Patient will apply compensatory strategies to improve intelligibility of speech during in spontaneous speech New  -KA        Voice Goals    Voice LTG's Patient will be able to engage in speech at the conversational level in all settings, using functional phonation, acceptable habitual pitch and balanced tone focus.  -KA     Patient will be able to engage in speech at the conversational level in all settings, using functional phonation, acceptable habitual pitch and balanced tone focus. 90%:;without cues  -KA     Status: Patient will be able to engage in speech at the conversational level in all settings, using functional phonation, acceptable habitual pitch and balanced tone focus. New  -KA     Voice STG's Pt will demonstrate an understanding of the structures and functions of the phonatory/respiratory tract (respiration, phonation, resonance and articulation);Patient will be able to use functional phonation  -KA     Patient will be able to use functional phonation 90%:;without cues  -KA     Status: Patient will be able to use functional phonation New  -KA     Pt will demonstrate an understanding of the structures and functions of the phonatory/respiratory tract (respiration, phonation, resonance and articulation) 90%:;without cues  -KA     Status: Pt will demonstrate an understanding of the structures and functions of the phonatory/respiratory tract (respiration, phonation, resonance and articulation) New  -KA               User Key  (r) = Recorded By, (t) = Taken By, (c) = Cosigned By      Initials Name Provider Type    KA  Gilmar Fernandez SLP Speech and Language Pathologist                           Time Calculation:   SLP Start Time: 1330  SLP Stop Time: 1415  SLP Time Calculation (min): 45 min  Untimed Charges  SLP Eval/Re-eval : ST Eval Speech Sound Production - 35953  [id number] Eval Speech Sound Production Minutes: 45  Total Minutes  Untimed Charges Total Minutes: 45   Total Minutes: 45    Therapy Charges for Today       Code Description Service Date Service Provider Modifiers Qty    [id number]  ST EVAL SPEECH SOUND PRODUCTION 3 2/7/2025 Gilmar Fernandez SLP GN 1                     TAMAR Encarnacion  2/7/2025

## 2025-02-17 ENCOUNTER — HOSPITAL ENCOUNTER (OUTPATIENT)
Dept: PHYSICAL THERAPY | Facility: HOSPITAL | Age: 80
Setting detail: THERAPIES SERIES
Discharge: HOME | End: 2025-02-17
Payer: MEDICARE

## 2025-02-17 DIAGNOSIS — Z74.09 IMPAIRED FUNCTIONAL MOBILITY, BALANCE, GAIT, AND ENDURANCE: Primary | ICD-10-CM

## 2025-02-17 DIAGNOSIS — R42 DIZZINESS: ICD-10-CM

## 2025-02-17 PROCEDURE — 97112 NEUROMUSCULAR REEDUCATION: CPT

## 2025-02-17 PROCEDURE — 97110 THERAPEUTIC EXERCISES: CPT

## 2025-02-17 NOTE — THERAPY TREATMENT NOTE
Outpatient Physical Therapy Neuro Treatment Note  [facility]     Patient Name: Kerrie Tyler  : 1945  MRN: [id number]  Today's Date: 2025      Visit Date: 2025    Visit Dx:    ICD-10-CM ICD-9-CM   1. Impaired functional mobility, balance, gait, and endurance  Z74.09 V49.89   2. Dizziness  R42 780.4       Patient Active Problem List   Diagnosis    Type 2 diabetes mellitus without complication, without long-term current use of insulin    Mixed hyperlipidemia    Essential hypertension    Coronary artery disease of bypass graft of native heart with stable angina pectoris    S/P CABG x 3    Benign prostatic hyperplasia with urinary hesitancy    Vitamin D deficiency    Presbycusis of both ears    Diabetic retinopathy associated with type 2 diabetes mellitus    Acute chest pain    Status post insertion of drug eluting coronary artery stent    Chest pain            PT Neuro       Row Name 25 1428             Subjective    Subjective Comments Ready to go  -LB         Precautions and Contraindications    Precautions/Limitations fall precautions  -LB         Subjective Pain    Able to rate subjective pain? yes  -LB      Subjective Pain Comment no pain  -LB         Cognition    Overall Cognitive Status WFL  -LB      Arousal/Alertness Appropriate responses to stimuli  -LB      Memory Appears intact  -LB      Orientation Level Oriented X4  -LB      Safety Judgment Good awareness of safety precautions  -LB      Deficits Fully aware of deficits  -LB         Posture/Observations    Posture/Observations Comments AMb to PT with spouse at side.  No AD. 5 min late due to incorrect location  -LB         Transfers    Sit-Stand [place] (Transfers) independent  -LB      Stand-Sit [place] (Transfers) independent  -LB      Comment, (Transfers) wide base of support, decreased forward weight shigt  -LB         Gait/Stairs (Locomotion)    [place] Level (Gait) independent  -LB      Assistive Device  (Gait) --  no AD  -LB      Distance in Feet (Gait) 50  x 4  -LB      Pattern (Gait) step-through  -LB      Deviations/Abnormal Patterns (Gait) ace decreased;gait speed decreased;stride length decreased;weight shifting decreased  -LB      Bilateral Gait Deviations decreased arm swing;heel strike decreased  -LB      Gait Assessment/Intervention Decreased amplitude of LEs; decreased trunk rotation and decreased hip extension  -LB         Balance Skills Training    Standing-Level of Assistance Contact guard  -LB      Static Standing Balance Support No upper extremity supported  -LB      Standing-Balance Activities Tandem Stance;Semi-tandum  -LB                User Key  (r) = Recorded By, (t) = Taken By, (c) = Cosigned By      Initials Name Provider Type    Emelia Raphael PT Physical Therapist                             PT Assessment/Plan       Row Name 02/17/25 162          PT Assessment    Assessment Comments The patient was able to demonstrate increased amplitude of bilateral lower extremities with heel strike and step length and over exaggerated bilateral arm swing when practicing.  Patient needed cues to translate to walking from 1 point to another.  Patient able to coordinate several exercises well with upper and lower extremities with increased amplitude with cues.  Patient overall with good endurance to activities only with requesting to sit once during session.  Encourage patient to work on walking at home as practiced with higher amplitude of movement, patient voiced understanding.  -LB               User Key  (r) = Recorded By, (t) = Taken By, (c) = Cosigned By      Initials Name Provider Type    Emelia Raphael PT Physical Therapist                        OP Exercises       Row Name 02/17/25 1627 02/17/25 5775          Subjective    Subjective Comments -- Ready to go  -LB        Subjective Pain    Able to rate subjective pain? -- yes  -LB     Subjective Pain Comment -- no pain  -LB        Total  Minutes    72321 - PT Therapeutic Exercise Minutes 30  -LB --     10252 -  PT Neuromuscular Reeducation Minutes 15  -LB --        Exercise 1    Exercise Name 1 -- STS  -LB     Reps 1 -- 5  -LB     Additional Comments -- elelvated mat  -LB        Exercise 2    Exercise Name 2 -- Nu step  -LB     Time 2 -- 5 mins  -LB     Additional Comments -- level 4  -LB        Exercise 3    Exercise Name 3 -- Modified forward step and reach  -LB     Cueing 3 -- Verbal;Demo  -LB     Reps 3 -- 10  -LB        Exercise 4    Exercise Name 4 -- Modified sideways step and reach  -LB     Cueing 4 -- Verbal;Demo  -LB     Reps 4 -- 10  -LB        Exercise 5    Exercise Name 5 -- Modified backward step and reach  -LB     Cueing 5 -- Verbal;Demo  -LB     Reps 5 -- 10  -LB        Exercise 6    Exercise Name 6 -- 1/4 turns  -LB     Sets 6 -- 2  -LB     Reps 6 -- 5  -LB        Exercise 7    Exercise Name 7 -- alternating step to  -LB     Reps 7 -- 10  -LB        Exercise 8    Exercise Name 8 -- Forward step ups  -LB     Reps 8 -- 10  -LB        Exercise 9    Exercise Name 9 -- walking BIG with high amplitude of UE and LEs  -LB     Additional Comments -- 50 ft times 6  -LB               User Key  (r) = Recorded By, (t) = Taken By, (c) = Cosigned By      Initials Name Provider Type    Emelia Raphael PT Physical Therapist                                    Therapy Education  Education Details: Practice walking at home with higher amplitude of UE and LEs  Given: Mobility training  Program: New  How Provided: Verbal, Demonstration  Provided to: Patient (and spouse)  Level of Understanding: Verbalized, Demonstrated, Teach back education performed              Time Calculation:   Start Time: 1415  Stop Time: 1500  Time Calculation (min): 45 min  Timed Charges  88337 - PT Therapeutic Exercise Minutes: 30  40782 -  PT Neuromuscular Reeducation Minutes: 15  Total Minutes  Timed Charges Total Minutes: 45   Total Minutes: 45   Therapy Charges for Today        Code Description Service Date Service Provider Modifiers Qty    [id number]  PT NEUROMUSC RE EDUCATION EA 15 MIN 2/17/2025 Emelia Hays, PT GP 1    [id number]  PT THER PROC EA 15 MIN 2/17/2025 Emelia Hays, PT GP 2                      Emelia Hays, PT  2/17/2025

## 2025-02-21 ENCOUNTER — HOSPITAL ENCOUNTER (OUTPATIENT)
Dept: SPEECH THERAPY | Facility: HOSPITAL | Age: 80
Setting detail: THERAPIES SERIES
Discharge: HOME | End: 2025-02-21
Payer: MEDICARE

## 2025-02-21 ENCOUNTER — HOSPITAL ENCOUNTER (OUTPATIENT)
Dept: PHYSICAL THERAPY | Facility: HOSPITAL | Age: 80
Setting detail: THERAPIES SERIES
Discharge: HOME | End: 2025-02-21
Payer: MEDICARE

## 2025-02-21 DIAGNOSIS — G20.A1 PARKINSON'S DISEASE, UNSPECIFIED WHETHER DYSKINESIA PRESENT, UNSPECIFIED WHETHER MANIFESTATIONS FLUCTUATE: Primary | ICD-10-CM

## 2025-02-21 DIAGNOSIS — Z74.09 IMPAIRED FUNCTIONAL MOBILITY, BALANCE, GAIT, AND ENDURANCE: Primary | ICD-10-CM

## 2025-02-21 DIAGNOSIS — R42 DIZZINESS: ICD-10-CM

## 2025-02-21 DIAGNOSIS — R47.1 DYSARTHRIA: ICD-10-CM

## 2025-02-21 DIAGNOSIS — M79.604 PAIN IN RIGHT LEG: ICD-10-CM

## 2025-02-21 PROCEDURE — 97110 THERAPEUTIC EXERCISES: CPT

## 2025-02-21 PROCEDURE — 92507 TX SP LANG VOICE COMM INDIV: CPT

## 2025-02-21 PROCEDURE — 97530 THERAPEUTIC ACTIVITIES: CPT

## 2025-02-21 NOTE — THERAPY TREATMENT NOTE
Outpatient Speech Language Pathology   Adult Speech Language Cognitive Treatment Note  [facility]     Patient Name: Kerrie Tyler  : 1945  MRN: [id number]  Today's Date: 2025         Visit Date: 2025   Patient Active Problem List   Diagnosis    Type 2 diabetes mellitus without complication, without long-term current use of insulin    Mixed hyperlipidemia    Essential hypertension    Coronary artery disease of bypass graft of native heart with stable angina pectoris    S/P CABG x 3    Benign prostatic hyperplasia with urinary hesitancy    Vitamin D deficiency    Presbycusis of both ears    Diabetic retinopathy associated with type 2 diabetes mellitus    Acute chest pain    Status post insertion of drug eluting coronary artery stent    Chest pain          Visit Dx:    ICD-10-CM ICD-9-CM   1. Parkinson's disease, unspecified whether dyskinesia present, unspecified whether manifestations fluctuate  G20.A1 332.0   2. Dysarthria  R47.1 784.51                              SLP OP Goals       Row Name 25 1500          Subjective Comments    Subjective Comments Pt brings in [id number]. He is interested in SLP ordering SPEAK OUT! workbook.  -BB        Dysarthria Goals    Patient will apply compensatory strategies to improve intelligibility of speech during in spontaneous speech 90%:;without cues  -BB     Status: Patient will apply compensatory strategies to improve intelligibility of speech during in spontaneous speech Progressing as expected  -BB     Comments: Patient will apply compensatory strategies to improve intelligibility of speech during in spontaneous speech Education/training regarding increased speech intelligibility (e.g., loud speech) and rationale: min A. Handout provided regarding biofeedback option (free daniel) to track speech intensity level. Reading exercise completed.  -BB        Voice Goals    Pt will demonstrate an understanding of the structures and functions of the  phonatory/respiratory tract (respiration, phonation, resonance and articulation) 90%:;without cues  -BB     Status: Pt will demonstrate an understanding of the structures and functions of the phonatory/respiratory tract (respiration, phonation, resonance and articulation) Progressing as expected  -BB     Comments: Pt will demonstrate an understanding of the structures and functions of the phonatory/respiratory tract (respiration, phonation, resonance and articulation) [id number]: 5 sets of 5  -BB               User Key  (r) = Recorded By, (t) = Taken By, (c) = Cosigned By      Initials Name Provider Type    Cortez Patterson, SLP Speech and Language Pathologist                           Time Calculation:   SLP Start Time: 1415  SLP Stop Time: 1500  SLP Time Calculation (min): 45 min  Untimed Charges  66553-ZV Treatment/ST Modification Prosth Aug Alter : 45  Total Minutes  Untimed Charges Total Minutes: 45   Total Minutes: 45    Therapy Charges for Today       Code Description Service Date Service Provider Modifiers Qty    [id number] HC ST TREATMENT SPEECH 3 2/21/2025 Cortez Gastelum, SLP GN 1                     TAMAR Alfred  2/21/2025

## 2025-02-21 NOTE — THERAPY TREATMENT NOTE
Outpatient Physical Therapy Neuro Treatment Note  [facility]     Patient Name: Kerrie Tyler  : 1945  MRN: [id number]  Today's Date: 2025      Visit Date: 2025    Visit Dx:    ICD-10-CM ICD-9-CM   1. Impaired functional mobility, balance, gait, and endurance  Z74.09 V49.89   2. Dizziness  R42 780.4   3. Pain in right leg  M79.604 729.5       Patient Active Problem List   Diagnosis    Type 2 diabetes mellitus without complication, without long-term current use of insulin    Mixed hyperlipidemia    Essential hypertension    Coronary artery disease of bypass graft of native heart with stable angina pectoris    S/P CABG x 3    Benign prostatic hyperplasia with urinary hesitancy    Vitamin D deficiency    Presbycusis of both ears    Diabetic retinopathy associated with type 2 diabetes mellitus    Acute chest pain    Status post insertion of drug eluting coronary artery stent    Chest pain            PT Neuro       Row Name 25 1530             Subjective    Subjective Comments No new issues  -LB         Precautions and Contraindications    Precautions/Limitations fall precautions  -LB         Subjective Pain    Able to rate subjective pain? yes  -LB      Pre-Treatment Pain Level 0  -LB      Post-Treatment Pain Level 0  -LB         Cognition    Overall Cognitive Status WFL  -LB         Posture/Observations    Posture/Observations Comments pt ambulated up to unit, no AD.  Spouse present during session  -LB         Transfers    Sit-Stand [place] (Transfers) independent  -LB      Stand-Sit [place] (Transfers) independent  -LB         Gait/Stairs (Locomotion)    [place] Level (Gait) independent  -LB      Distance in Feet (Gait) 100  x 4  -LB      Pattern (Gait) step-through  -LB      Deviations/Abnormal Patterns (Gait) ace decreased;gait speed decreased;stride length decreased;weight shifting decreased  -LB      Bilateral Gait Deviations decreased arm swing;heel strike decreased   -LB      Gait Assessment/Intervention Cues for increased heelstrike, step length and armswing.  Over exaggerated arm swing for 2 walks; normalized to normal arm swing for the last walk  -LB                User Key  (r) = Recorded By, (t) = Taken By, (c) = Cosigned By      Initials Name Provider Type    Emelia Raphael PT Physical Therapist                             PT Assessment/Plan       Row Name 02/21/25 1609          PT Assessment    Assessment Comments Patient displayed good endurance during session with really no rest breaks needed.  Started on exercise program with emphasis on wider base of support, higher amplitude of movement as well as speed of movement.  Patient able to perform exercises well with minimal cueing with the use of handout.  Patient displayed increased amplitude at least 75% of the time.  For most exercises patient able to perform at least 1 rep without therapist performing with him.  Patient did well with over exaggerated walking and encourage patient to work this into his daily activities.  -LB               User Key  (r) = Recorded By, (t) = Taken By, (c) = Cosigned By      Initials Name Provider Type    Emelia Raphael PT Physical Therapist                        OP Exercises       Row Name 02/21/25 1611 02/21/25 1530          Subjective    Subjective Comments -- No new issues  -LB        Subjective Pain    Able to rate subjective pain? -- yes  -LB     Pre-Treatment Pain Level -- 0  -LB     Post-Treatment Pain Level -- 0  -LB        Total Minutes    54955 - PT Therapeutic Exercise Minutes 30  -LB --     98534 - PT Therapeutic Activity Minutes 10  -LB --        Exercise 1    Exercise Name 1 -- [id number]  -LB     Cueing 1 -- Verbal;Demo  -LB     Reps 1 -- 5  -LB     Time 1 -- 10  -LB        Exercise 2    Exercise Name 2 -- Side to side  -LB     Cueing 2 -- Verbal;Demo  -LB     Reps 2 -- 5  -LB     Time 2 -- 10  -LB        Exercise 3    Exercise Name 3 -- Modified forward step  and reach  -LB     Cueing 3 -- Verbal;Demo  -LB     Reps 3 -- 10  -LB        Exercise 4    Exercise Name 4 -- Modified sideways step and reach  -LB     Cueing 4 -- Verbal;Demo  -LB     Reps 4 -- 10  -LB        Exercise 5    Exercise Name 5 -- Modified backward step and reach  -LB     Cueing 5 -- Verbal;Demo  -LB     Reps 5 -- 10  -LB        Exercise 6    Exercise Name 6 -- Modified forward rock and reach  -LB     Cueing 6 -- Verbal;Demo  -LB     Reps 6 -- 10  -LB        Exercise 7    Exercise Name 7 -- Modified sideways rock and reach  -LB     Cueing 7 -- Verbal;Demo  -LB     Reps 7 -- 10  -LB        Exercise 8    Exercise Name 8 -- Sit to stand  -LB     Cueing 8 -- Demo  -LB     Reps 8 -- 5  -LB               User Key  (r) = Recorded By, (t) = Taken By, (c) = Cosigned By      Initials Name Provider Type    Emelia Raphael, PT Physical Therapist                                    Therapy Education  Education Details: HEP  Given: HEP  Program: New  How Provided: Verbal, Demonstration, Written  Provided to: Patient  Level of Understanding: Verbalized, Teach back education performed, Demonstrated              Time Calculation:   Start Time: 1500  Stop Time: 1540  Time Calculation (min): 40 min  Timed Charges  87319 - PT Therapeutic Exercise Minutes: 30  03029 - PT Therapeutic Activity Minutes: 10  Total Minutes  Timed Charges Total Minutes: 40   Total Minutes: 40   Therapy Charges for Today       Code Description Service Date Service Provider Modifiers Qty    75261942958  PT THER PROC EA 15 MIN 2/21/2025 Emelia Hays, PT GP 2    [id number]  PT THERAPEUTIC ACT EA 15 MIN 2/21/2025 Emelia Hays, PT GP 1                      Emelia Hays PT  2/21/2025

## 2025-02-24 ENCOUNTER — HOSPITAL ENCOUNTER (OUTPATIENT)
Dept: PHYSICAL THERAPY | Facility: HOSPITAL | Age: 80
Setting detail: THERAPIES SERIES
Discharge: HOME | End: 2025-02-24
Payer: MEDICARE

## 2025-02-24 DIAGNOSIS — R42 DIZZINESS: ICD-10-CM

## 2025-02-24 DIAGNOSIS — Z74.09 IMPAIRED FUNCTIONAL MOBILITY, BALANCE, GAIT, AND ENDURANCE: Primary | ICD-10-CM

## 2025-02-24 PROCEDURE — 97110 THERAPEUTIC EXERCISES: CPT

## 2025-02-24 PROCEDURE — 97112 NEUROMUSCULAR REEDUCATION: CPT

## 2025-02-24 NOTE — THERAPY TREATMENT NOTE
Outpatient Physical Therapy Neuro Treatment Note  [facility]     Patient Name: Kerrie Tyler  : 1945  MRN: [id number]  Today's Date: 2025      Visit Date: 2025    Visit Dx:    ICD-10-CM ICD-9-CM   1. Impaired functional mobility, balance, gait, and endurance  Z74.09 V49.89   2. Dizziness  R42 780.4       Patient Active Problem List   Diagnosis    Type 2 diabetes mellitus without complication, without long-term current use of insulin    Mixed hyperlipidemia    Essential hypertension    Coronary artery disease of bypass graft of native heart with stable angina pectoris    S/P CABG x 3    Benign prostatic hyperplasia with urinary hesitancy    Vitamin D deficiency    Presbycusis of both ears    Diabetic retinopathy associated with type 2 diabetes mellitus    Acute chest pain    Status post insertion of drug eluting coronary artery stent    Chest pain            PT Neuro       Row Name 25 1404             Subjective    Subjective Comments Did the exercises each day  -LB         Precautions and Contraindications    Precautions/Limitations fall precautions  -LB         Subjective Pain    Able to rate subjective pain? yes  -LB      Pre-Treatment Pain Level 0  -LB      Post-Treatment Pain Level 0  -LB         Cognition    Overall Cognitive Status WFL  -LB         Posture/Observations    Posture/Observations Comments pt ambulated up to unit, no AD.  Spouse present during session  -LB         Transfers    Sit-Stand [place] (Transfers) independent  -LB      Stand-Sit [place] (Transfers) independent  -LB         Gait/Stairs (Locomotion)    [place] Level (Gait) independent  -LB      Distance in Feet (Gait) 200  -LB      Pattern (Gait) step-through  -LB      Deviations/Abnormal Patterns (Gait) ace decreased;gait speed decreased;stride length decreased;weight shifting decreased  -LB      Left Sided Gait Deviations --  decreased arm swing  -LB      Gait Assessment/Intervention With  "cues, the patient is able to display increased bilateral armswing; stride length and heelstrike.  Pt able to maintain at least 75% of the time  -LB         Balance Skills Training    Gait Balance-Level of Assistance Close supervision  -LB      Gait Balance Support No upper extremity supported  -LB      Gait Balance Activities stepping over object;hurdles  -LB                User Key  (r) = Recorded By, (t) = Taken By, (c) = Cosigned By      Initials Name Provider Type    Emelia Raphael PT Physical Therapist                             PT Assessment/Plan       Row Name 02/24/25 1453          PT Assessment    Assessment Comments Pt reports performing HEP since last session.  The patient only needed demonstration with 1 rep on most of the exercises.  With all exercises, the patient is displaying increased amplitude of UE and LEs.  Reinforced need to \"stomp\" for higher amplitude.  Encouraged pt to increase his voice volume and the patient was able to maintain at least 50% of the time.  When not cued the patient did return to a slightly shuffling gait pattern and with walking shorter distance.  -LB               User Key  (r) = Recorded By, (t) = Taken By, (c) = Cosigned By      Initials Name Provider Type    Emelia Raphael PT Physical Therapist                        OP Exercises       Row Name 02/24/25 1456 02/24/25 1404          Subjective    Subjective Comments -- Did the exercises each day  -LB        Subjective Pain    Able to rate subjective pain? -- yes  -LB     Pre-Treatment Pain Level -- 0  -LB     Post-Treatment Pain Level -- 0  -LB        Total Minutes    06366 - PT Therapeutic Exercise Minutes 28  -LB --     55872 -  PT Neuromuscular Reeducation Minutes 17  -LB --        Exercise 1    Exercise Name 1 -- [id number]  -LB     Cueing 1 -- Verbal;Demo  -LB     Reps 1 -- 5  -LB     Time 1 -- 10  -LB        Exercise 2    Exercise Name 2 -- Side to side  -LB     Cueing 2 -- Verbal;Demo  -LB     Reps 2 -- " 5  -LB     Time 2 -- 10  -LB        Exercise 3    Exercise Name 3 -- Modified forward step and reach  -LB     Cueing 3 -- Verbal;Demo  -LB     Reps 3 -- 10  -LB        Exercise 4    Exercise Name 4 -- Modified sideways step and reach  -LB     Cueing 4 -- Verbal;Demo  -LB     Reps 4 -- 10  -LB        Exercise 5    Exercise Name 5 -- Modified backward step and reach  -LB     Cueing 5 -- Verbal;Demo  -LB     Reps 5 -- 10  -LB        Exercise 6    Exercise Name 6 -- Modified forward rock and reach  -LB     Cueing 6 -- Verbal;Demo  -LB     Reps 6 -- 10  -LB        Exercise 7    Exercise Name 7 -- Modified sideways rock and reach  -LB     Cueing 7 -- Verbal;Demo  -LB     Reps 7 -- 10  -LB        Exercise 8    Exercise Name 8 -- Sit to stand  -LB     Cueing 8 -- Demo  -LB     Reps 8 -- 5  -LB        Exercise 9    Exercise Name 9 -- 1/4 turns  -LB     Cueing 9 -- Demo  -LB     Reps 9 -- 5 e  -LB               User Key  (r) = Recorded By, (t) = Taken By, (c) = Cosigned By      Initials Name Provider Type    Emelia Raphael, PT Physical Therapist                                    Therapy Education  Education Details: HEP review  Given: HEP  Program: Reinforced, Progressed  How Provided: Verbal, Demonstration, Written  Provided to: Patient  Level of Understanding: Verbalized, Teach back education performed              Time Calculation:   Start Time: 1330  Stop Time: 1415  Time Calculation (min): 45 min  Timed Charges  85528 - PT Therapeutic Exercise Minutes: 28  69693 -  PT Neuromuscular Reeducation Minutes: 17  Total Minutes  Timed Charges Total Minutes: 45   Total Minutes: 45   Therapy Charges for Today       Code Description Service Date Service Provider Modifiers Qty    31652271317 HC PT NEUROMUSC RE EDUCATION EA 15 MIN 2/24/2025 Emelia Hays, PT GP 1    [id number] HC PT THER PROC EA 15 MIN 2/24/2025 Emelia Hays PT GP 2                      Emelia Hays PT  2/24/2025

## 2025-02-27 ENCOUNTER — OFFICE VISIT (OUTPATIENT)
Dept: NEUROLOGY | Facility: CLINIC | Age: 80
End: 2025-02-27
Payer: MEDICARE

## 2025-02-27 VITALS
WEIGHT: 152 LBS | HEIGHT: 66 IN | SYSTOLIC BLOOD PRESSURE: 98 MMHG | HEART RATE: 58 BPM | OXYGEN SATURATION: 97 % | BODY MASS INDEX: 24.43 KG/M2 | DIASTOLIC BLOOD PRESSURE: 64 MMHG

## 2025-02-27 DIAGNOSIS — G20.A1 PARKINSON'S DISEASE WITHOUT DYSKINESIA OR FLUCTUATING MANIFESTATIONS: Primary | ICD-10-CM

## 2025-02-27 RX ORDER — CARBIDOPA AND LEVODOPA 50; 200 MG/1; MG/1
1 TABLET, EXTENDED RELEASE ORAL NIGHTLY
Qty: 30 TABLET | Refills: 2 | Status: SHIPPED | OUTPATIENT
Start: 2025-02-27

## 2025-02-27 NOTE — PROGRESS NOTES
NAME: Kerrie Tylre   : 1945    Chief Complaint   Patient presents with    Parkinson's disease without dyskinesia or fluctuating manif        HPI:  Kerrie Tyler is a 79 y.o.  right-handed male who I am seeing for the first time in follow-up regarding Parkinson's disease.  He is accompanied by his wife who adds to the history.  He was last seen by Dr. Salinas on 2025, with the following history taken from that note with additions/modifications as indicated:    The patient states that in May of last year he had COVID and then afterwards he feels that his steps have slowly been getting shorter and that he has developed reduced voice projection and slowed movements along with some resting tremor of the left hand. The patient denies any family history of a similar finding. He is a retired ENT.     Patient states that his memory is sharp and they denies any visual hallucinations. He denies numbness or tingling in the hands or feet. He has history of a fall 2023 landing on his face without loss of consciousness. He states that a head CT was obtained which was negative for a bleed. About 2 months later he fell again this time it was at home at night with fracture of the right tibia.     History interim    Patient states he is doing better after starting carbidopa/levodopa 1 tablet 3 times a day.  He takes his meds at 7/2/10 without any side effects.  He rarely notices a left hand tremor that is worse with stress.  He states that his shuffling gait is better.  He denies any freezing or festination's.  He states he is now able to stand up the first time when before it took him 3 or 4 tries.  He states physical therapy and speech therapy has also helped.  He received his speech therapy exercises but today.  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  He feels like his voices course and not as well projected.  He denies any drooling or trouble swallowing.  He denies any  memory concerns, trouble sleeping, vivid dreams or hallucinations.  He reports difficulty turning over in bed at night and takes longer getting out of bed in the morning.  He denies any falls.  He denies any dizziness.  He and his wife walk at the park and he also bikes.      Past Medical History:   Diagnosis Date    Allergic     Coronary artery disease 1994    CABAG    Diabetes mellitus 2012    Type2. Controlled    Difficulty walking 4 months    Hyperlipidemia 1994    Controlled    Kidney stone     Movement disorder 4 months    Myocardial infarction          Past Surgical History:   Procedure Laterality Date    CARDIAC CATHETERIZATION N/A 08/24/2022    Procedure: Left Heart Cath  FEMORAL ACCESS;  Surgeon: Jam Gavin MD;  Location: [facility] CATH INVASIVE LOCATION;  Service: Cardiovascular;  Laterality: N/A;  Use femoral access- patient has bilateral CAMERON's with previous CABG    CARDIAC CATHETERIZATION N/A 08/24/2022    Procedure: Left ventriculography;  Surgeon: Jam Gavin MD;  Location: [facility] CATH INVASIVE LOCATION;  Service: Cardiovascular;  Laterality: N/A;    CARDIAC CATHETERIZATION N/A 08/24/2022    Procedure: Native mammary injection;  Surgeon: Jam Gavin MD;  Location: [facility] CATH INVASIVE LOCATION;  Service: Cardiovascular;  Laterality: N/A;    CARDIAC CATHETERIZATION  08/24/2022    Procedure: RESTING FULL CYCLE RATIO;  Surgeon: Jam Gavin MD;  Location: [facility] CATH INVASIVE LOCATION;  Service: Cardiovascular;;    CARDIAC CATHETERIZATION N/A 08/24/2022    Procedure: Stent LIZBET bypass graft;  Surgeon: Jam Gavin MD;  Location: [facility] CATH INVASIVE LOCATION;  Service: Cardiovascular;  Laterality: N/A;    CARDIAC CATHETERIZATION N/A 08/24/2022    Procedure: Coronary angiography;  Surgeon: Jam Gavin MD;  Location: [facility] CATH INVASIVE LOCATION;  Service: Cardiovascular;  Laterality: N/A;    CARDIAC CATHETERIZATION N/A 08/24/2022    Procedure: Percutaneous Coronary Intervention;   Surgeon: Jam Gavin MD;  Location: [facility] CATH INVASIVE LOCATION;  Service: Cardiovascular;  Laterality: N/A;    CARDIAC SURGERY      CATARACT EXTRACTION, BILATERAL Bilateral     CORONARY ARTERY BYPASS GRAFT      3 vessel    CORONARY STENT PLACEMENT  2022    EXTRACORPOREAL SHOCK WAVE LITHOTRIPSY (ESWL) Bilateral 2004    EYE SURGERY      TONSILLECTOMY             Current Outpatient Medications:     aspirin 81 MG EC tablet, Take 1 tablet by mouth Daily., Disp: 30 tablet, Rfl: 11    carbidopa-levodopa (SINEMET)  MG per tablet, 1/2 tab 3 times daily for 1 week then 1 tab 3 times daily, Disp: 90 tablet, Rfl: 5    finasteride (PROSCAR) 5 MG tablet, Take 1 tablet by mouth Daily., Disp: , Rfl:     glucose blood (Accu-Chek Guide) test strip, 1 each by Other route 3 (Three) Times a Day. Use as instructed, Disp: 270 each, Rfl: 3    glucose monitor monitoring kit, 1 each Daily., Disp: 1 each, Rfl: 1    metFORMIN ER (GLUCOPHAGE-XR) 500 MG 24 hr tablet, TAKE 1 TABLET BY MOUTH TWICE DAILY, Disp: 180 tablet, Rfl: 3    metoprolol tartrate (LOPRESSOR) 25 MG tablet, TAKE HALF A TABLET BY MOUTH TWICE DAILY, Disp: 180 tablet, Rfl: 3    rosuvastatin (CRESTOR) 10 MG tablet, TAKE 1 TABLET BY MOUTH DAILY, Disp: 90 tablet, Rfl: 3    tamsulosin (FLOMAX) 0.4 MG capsule 24 hr capsule, TAKE 1 CAPSULE BY MOUTH DAILY, Disp: 90 capsule, Rfl: 3    trandolapril (MAVIK) 4 MG tablet, TAKE 1 TABLET BY MOUTH TWICE DAILY, Disp: 180 tablet, Rfl: 1    carbidopa-levodopa CR (SINEMET CR)  MG per CR tablet, Take 1 tablet by mouth Every Night., Disp: 30 tablet, Rfl: 2      Family History   Problem Relation Age of Onset    Heart attack Mother              Diabetes Father     Heart disease Father         [id number]    Hypertension Father     Hyperlipidemia Father     Hypertension Sister     Hyperlipidemia Sister     Heart disease Sister         CABAG     Diabetes Brother     Heart disease Brother         Also  "Bypass 1998    Hypertension Brother     Hyperlipidemia Brother     Prostate cancer Neg Hx          Social History     Socioeconomic History    Marital status:    Tobacco Use    Smoking status: Former     Current packs/day: 0.00     Average packs/day: 0.5 packs/day for 12.0 years (6.0 ttl pk-yrs)     Types: Cigarettes     Start date: 1982     Quit date: 1994     Years since quittin.1    Smokeless tobacco: Never   Vaping Use    Vaping status: Never Used   Substance and Sexual Activity    Alcohol use: Never    Drug use: Never    Sexual activity: Not Currently     Partners: Female         Allergies   Allergen Reactions    Sulfa Antibiotics Rash, Itching, Unknown - Low Severity and Other (See Comments)    Trimethoprim Rash         Pain Scale: 0/10        ROS:  Review of Systems   HENT:  Positive for voice change. Negative for drooling and trouble swallowing.    Musculoskeletal:  Positive for gait problem.   Neurological:  Positive for tremors. Negative for dizziness and numbness.   Psychiatric/Behavioral:  Negative for hallucinations and sleep disturbance.          Physical Exam:  Vitals:    25 1500   BP: 98/64   Pulse: 58   SpO2: 97%   Weight: 68.9 kg (152 lb)   Height: 167.6 cm (65.98\")       Orthostatic BP:       Physical Exam  General: Well-developed male no acute distress  HEENT: Normocephalic no evidence of trauma.  Hypomimia and reduced lid blink  Neck: Supple.    Heart: Irregularly irregular rhythm  Extremities: Radial pulses strong and simultaneous.        Neurological Exam:   Mental Status: Awake, alert, oriented to person, place and time.  Conversant without evidence of an affective disorder, thought disorder, delusions or hallucinations.  Attention span and concentration are normal.  HCF: No aphasia, apraxia or dysarthria.  Recent and remote memory intact.  Knowledge of recent events intact.  CN: I:   II: Visual fields full without left inattention   III, IV, VI: Eye movements intact " without nystagmus or ptosis.  Pupils equal round and reactive to light.   V,VII: Light touch and pinprick intact all 3 divisions of V.  Facial muscles symmetrical.   VIII: Hearing intact to finger rub   IX,X: Soft palate elevates symmetrically   XI: Sternomastoid and trapezius are strong.   XII: Tongue midline without atrophy or fasciculations    Motor: Normal tone and bulk in the upper and lower extremities.  No cogwheeling    Power testing: Full power in all muscles tested in the arms and legs    Reflexes: Upper extremities:        Lower extremities:        Toe signs:        Clonus:     Sensory: Light touch:        Pinprick:        Vibration:        Position:        Temperature:    Cerebellar: Finger-to-nose: Slow.  No resting tremor seen on exam.           Rapid movement: Intact           Heel-to-shin:    Gait and Station: Comes to stand without much difficulty.  Reduced step length and armswing.  Multiple steps turning.  No tremor activated with ambulation.  Slightly flexed forward posture at head and neck.  No Romberg no drift.      Results:    Lab Results   Component Value Date    GLUCOSE 174 (H) 12/06/2024    BUN 13 12/06/2024    CREATININE 1.06 12/06/2024    EGFRIFNONA 83 09/14/2021    EGFRIFAFRI 96 09/14/2021    BCR 12 12/06/2024    CO2 27 12/06/2024    CALCIUM 10.1 12/06/2024    ALBUMIN 4.4 12/06/2024    AST 16 12/06/2024    ALT 15 12/06/2024     Lab Results   Component Value Date    WBC 7.31 02/29/2024    HGB 13.6 02/29/2024    HCT 41.2 02/29/2024    MCV 86.4 02/29/2024     02/29/2024     Lab Results   Component Value Date    TSH 0.961 02/29/2024     Lab Results   Component Value Date    HGBA1C 6.0 (H) 12/06/2024       Assessment:    1.  Idiopathic Parkinson's disease-patient reports improvement after starting carbidopa/levodopa without any side effects.  He continues to report difficulty turning over in bed at night and getting out of bed in the morning.  Discussed taking his carbidopa/levodopa IR  at 7/2/6 and adding CR before bed.  Patient in agreement.  Patient noticed improvements with physical therapy and speech therapy.           Assessment and Plan   Diagnoses and all orders for this visit:    1. Parkinson's disease without dyskinesia or fluctuating manifestations (Primary)  -     carbidopa-levodopa CR (SINEMET CR)  MG per CR tablet; Take 1 tablet by mouth Every Night.  Dispense: 30 tablet; Refill: 2        Ideal targets for risk factor control would be Blood pressure < 130/80, B12 > 500, TSH in normal range and LDL < 70; HbA1c < 6.5 and smoking cessation if applicable. Call 911 for stroke symptoms.  Recommend 150 minutes of physical activity weekly.  Limit alcohol intake.  Continue carbidopa/levodopa 25/100 mg 1 tablet 3 times a day  Trial carbidopa/levodopa CR 50/200 mg 1 tablet before bed  Continue physical therapy exercises and speech therapy exercises at home  Follow-up in 2 months or sooner if needed    Avoid taking your carbidopa/levodopa with food (particularly protein).  Take one hour before or 2 hours after meals.  - Side effects include nausea and dizziness upon standing.  - At higher doses, may cause excessive movements called dyskinesias, confusion, or hallucinations.     Check out the Parkinson's Foundation at parkinson.org, the [employer] at [email or web address], or [email or web address].[email or web address]    Consider increasing exercise with yoga, dance, domenica chi, boxing, or music therapy.    Some [facility] gyms offer a free Parkinson's clinic.  Call your local [facility] to see if it is available.  Rock steady boxing is also a great boxing based fitness curriculum for Parkinson's disease.  [facility] offers [email or web address] boxing.   [employer] [facility] for speech.    [employer] offers a free Parkinson's exercise class.     Consider cognitive exercise as well such as puzzles, word search, Sudoku, etc.     For constipation, increase water intake, eat fruits and vegetables, whole grains, exercise,  consider polythylene glycol (Miralax) or bisacodyl suppository, or abdominal massage.      For sleep and vivid dreams, consider melatonin 5-10 mg over-the-counter supplement.    [facility] ESTEV[facility]  [address] [place] [address]  [phone], [phone]  [email or web address]     In-Person: Monday and Wednesday, 9:30 AM  [facility]  [address] [address]     Zoom: Monday, Wednesday, Friday 9:30 AM  ID# [phone]  Website: [employer]  Facebook: [employer] [facility]  YouTube: [employer] [place]     Time: Spent 30 minutes in total encounter time. This included time for chart review, obtaining history, performing pertinent physical examination, updating medical information, ordering tests/referrals, discussion of diagnosis, medical management, counseling, and encounter documentation.        BEN Thorpe          Much of this encounter note was dictated utilizing Dragon dictation which is an electronic transcription/translation of spoken language to printed text. The electronic translation of spoken language may permit erroneous, or at times, nonsensical words or phrases to be inadvertently transcribed; Although I have reviewed the note for such errors, some may still exist.    Portions of this assessment have been copied from previous documentation which has been thoroughly reviewed and updated as appropriate.

## 2025-02-28 ENCOUNTER — HOSPITAL ENCOUNTER (OUTPATIENT)
Dept: PHYSICAL THERAPY | Facility: HOSPITAL | Age: 80
Setting detail: THERAPIES SERIES
Discharge: HOME | End: 2025-02-28
Payer: MEDICARE

## 2025-02-28 ENCOUNTER — HOSPITAL ENCOUNTER (OUTPATIENT)
Dept: SPEECH THERAPY | Facility: HOSPITAL | Age: 80
Setting detail: THERAPIES SERIES
Discharge: HOME | End: 2025-02-28
Payer: MEDICARE

## 2025-02-28 DIAGNOSIS — R47.1 DYSARTHRIA: ICD-10-CM

## 2025-02-28 DIAGNOSIS — Z74.09 IMPAIRED FUNCTIONAL MOBILITY, BALANCE, GAIT, AND ENDURANCE: Primary | ICD-10-CM

## 2025-02-28 DIAGNOSIS — R42 DIZZINESS: ICD-10-CM

## 2025-02-28 DIAGNOSIS — G20.A1 PARKINSON'S DISEASE, UNSPECIFIED WHETHER DYSKINESIA PRESENT, UNSPECIFIED WHETHER MANIFESTATIONS FLUCTUATE: Primary | ICD-10-CM

## 2025-02-28 PROCEDURE — 97530 THERAPEUTIC ACTIVITIES: CPT

## 2025-02-28 PROCEDURE — 97110 THERAPEUTIC EXERCISES: CPT

## 2025-02-28 PROCEDURE — 92507 TX SP LANG VOICE COMM INDIV: CPT | Performed by: SPEECH-LANGUAGE PATHOLOGIST

## 2025-02-28 PROCEDURE — 97112 NEUROMUSCULAR REEDUCATION: CPT

## 2025-02-28 NOTE — THERAPY TREATMENT NOTE
Outpatient Speech Language Pathology   Adult Speech Language Cognitive Treatment Note  [facility]     Patient Name: Kerrie Tyler  : 1945  MRN: [id number]  Today's Date: 2025         Visit Date: 2025   Patient Active Problem List   Diagnosis    Type 2 diabetes mellitus without complication, without long-term current use of insulin    Mixed hyperlipidemia    Essential hypertension    Coronary artery disease of bypass graft of native heart with stable angina pectoris    S/P CABG x 3    Benign prostatic hyperplasia with urinary hesitancy    Vitamin D deficiency    Presbycusis of both ears    Diabetic retinopathy associated with type 2 diabetes mellitus    Acute chest pain    Status post insertion of drug eluting coronary artery stent    Chest pain          Visit Dx:    ICD-10-CM ICD-9-CM   1. Parkinson's disease, unspecified whether dyskinesia present, unspecified whether manifestations fluctuate  G20.A1 332.0   2. Dysarthria  R47.1 784.51                              SLP OP Goals       Row Name 25 1500          Subjective Comments    Subjective Comments Patient is pleasant and cooperative  -KA        Subjective Pain    Able to rate subjective pain? yes  -KA     Pre-Treatment Pain Level 0  -KA     Post-Treatment Pain Level 0  -KA        Voice Goals    Patient will be able to use functional phonation 90%:;without cues  -KA     Status: Patient will be able to use functional phonation New  -KA     Comments: Patient will be able to use functional phonation Education completed today on relationship of voice and respiration with importance of deep inhalation prior to phonation and importance of getting loud. Discussed importance of overarticulation, increased loudness, get loud and pausing every 4 to 6 weeks (reducing phrase length per breath) to maintain loudness. Pt practiced these strategies while reading 5 to 7 word sentences and implemented strategies 100% without cues. When pt was finished  reading and started to talk he demonstrated no implementation of strategies with his voice reverted back to weak vocal intensity. SLP educated pt the importance he implement at the conversation level. Pt conversed at the conversation level after education and pt did implement better requiring min to mod cues averaging 70% accuracy.  -KA     Pt will demonstrate an understanding of the structures and functions of the phonatory/respiratory tract (respiration, phonation, resonance and articulation) 90%:;without cues  -KA     Status: Pt will demonstrate an understanding of the structures and functions of the phonatory/respiratory tract (respiration, phonation, resonance and articulation) Progressing as expected  -KA     Comments: Pt will demonstrate an understanding of the structures and functions of the phonatory/respiratory tract (respiration, phonation, resonance and articulation) [id number]: 5 sets of 5 breaths for total of 25 breaths, nob turned to [id number] min cues to inhale more deeply. Pt performed 10 sets of pitch glides.  -KA               User Key  (r) = Recorded By, (t) = Taken By, (c) = Cosigned By      Initials Name Provider Type    Gilmar Larsen SLP Speech and Language Pathologist                           Time Calculation:   SLP Start Time: 1336  SLP Stop Time: 1415  SLP Time Calculation (min): 39 min  Untimed Charges  67639-NC Treatment/ST Modification Prosth Aug Alter : 39  Total Minutes  Untimed Charges Total Minutes: 39   Total Minutes: 39    Therapy Charges for Today       Code Description Service Date Service Provider Modifiers Qty    94028406065  ST TREATMENT SPEECH 3 2/28/2025 Gilmar Fernandez SLP GN 1                     TAMAR Encarnacion  2/28/2025

## 2025-02-28 NOTE — THERAPY TREATMENT NOTE
Outpatient Physical Therapy Neuro Treatment Note  [facility]     Patient Name: Kerrie Tyler  : 1945  MRN: [id number]  Today's Date: 2025      Visit Date: 2025    Visit Dx:    ICD-10-CM ICD-9-CM   1. Impaired functional mobility, balance, gait, and endurance  Z74.09 V49.89   2. Dizziness  R42 780.4       Patient Active Problem List   Diagnosis    Type 2 diabetes mellitus without complication, without long-term current use of insulin    Mixed hyperlipidemia    Essential hypertension    Coronary artery disease of bypass graft of native heart with stable angina pectoris    S/P CABG x 3    Benign prostatic hyperplasia with urinary hesitancy    Vitamin D deficiency    Presbycusis of both ears    Diabetic retinopathy associated with type 2 diabetes mellitus    Acute chest pain    Status post insertion of drug eluting coronary artery stent    Chest pain            PT Neuro       Row Name 25 1421             Subjective    Subjective Comments Doing my exercises twice a day  -LB         Precautions and Contraindications    Precautions/Limitations fall precautions  -LB         Subjective Pain    Able to rate subjective pain? yes  -LB      Pre-Treatment Pain Level 0  -LB      Post-Treatment Pain Level 0  -LB         Cognition    Overall Cognitive Status WFL  -LB      Arousal/Alertness Appropriate responses to stimuli  -LB      Memory Appears intact  -LB      Orientation Level Oriented X4  -LB      Safety Judgment Good awareness of safety precautions  -LB      Deficits Fully aware of deficits  -LB         Posture/Observations    Posture/Observations Comments pt ambulated up to unit, no AD.  Spouse present during session  -LB         Transfers    Sit-Stand [place] (Transfers) independent  -LB      Stand-Sit [place] (Transfers) independent  -LB         Gait/Stairs (Locomotion)    [place] Level (Gait) independent  -LB      Distance in Feet (Gait) 150  -LB      Pattern (Gait) step-through   -LB      Deviations/Abnormal Patterns (Gait) ace decreased;gait speed decreased;stride length decreased  -LB      Bilateral Gait Deviations decreased arm swing;heel strike decreased  -LB      Gait Assessment/Intervention Pt displaying increased heelstrike and step length  -LB         Balance Skills Training    Standing Balance # of Minutes walking with cross rotation with 2# on wrist  -LB      Gait Balance-Level of Assistance Close supervision  -LB      Gait Balance Support No upper extremity supported  -LB      Gait Balance Activities floor ladder;side-stepping;foam beam;backwards  -LB      Gait Balance # of Minutes diagonal through floor ladder  -LB      Stepping Strategy Assessment (Balance) stepping forward and side way over 1/2 roll with RTB around thighs; 10 in each direction wth SBA  -LB      Balance Comments Walking with increased heelstrike, step length and armswing for 200 ft times 2  -LB                User Key  (r) = Recorded By, (t) = Taken By, (c) = Cosigned By      Initials Name Provider Type    Emelia Raphael PT Physical Therapist                             PT Assessment/Plan       Row Name 02/28/25 1600          PT Assessment    Assessment Comments Pt reports an improvement in his balance.  Patient has been performing his home exercise program twice a day and has worked on increasing his walking.  Patient has a greater tendency to increase heel strike and step length without any cueing.  Left clearance is a little bit less than the right.  Patient able to walk and stand on compliant surfaces maintaining good balance.  -LB               User Key  (r) = Recorded By, (t) = Taken By, (c) = Cosigned By      Initials Name Provider Type    Emelia Raphael PT Physical Therapist                        OP Exercises       Row Name 02/28/25 1610 02/28/25 1421          Subjective    Subjective Comments -- Doing my exercises twice a day  -LB        Subjective Pain    Able to rate subjective pain? --  "yes  -LB     Pre-Treatment Pain Level -- 0  -LB     Post-Treatment Pain Level -- 0  -LB        Total Minutes    00500 - PT Therapeutic Exercise Minutes 14  -LB --     99839 -  PT Neuromuscular Reeducation Minutes 20  -LB --     80650 - PT Therapeutic Activity Minutes 10  -LB --        Exercise 10    Exercise Name 10 -- step downs from inclined stretcher  -LB     Reps 10 -- 10  -LB        Exercise 11    Exercise Name 11 -- UE cross rotation \"Boxing\"  -LB     Sets 11 -- 2  -LB     Reps 11 -- 5  -LB     Additional Comments -- 1.5# on wrist  -LB               User Key  (r) = Recorded By, (t) = Taken By, (c) = Cosigned By      Initials Name Provider Type    Emelia Raphael PT Physical Therapist                                    Therapy Education  Education Details: Community resources for [employer] and [employer]  Given: Other (comment)  Program: New  How Provided: Verbal, Demonstration  Provided to: Patient  Level of Understanding: Verbalized              Time Calculation:   Start Time: 1416  Stop Time: 1500  Time Calculation (min): 44 min  Timed Charges  73348 - PT Therapeutic Exercise Minutes: 14  91304 -  PT Neuromuscular Reeducation Minutes: 20  39451 - PT Therapeutic Activity Minutes: 10  Total Minutes  Timed Charges Total Minutes: 44   Total Minutes: 44   Therapy Charges for Today       Code Description Service Date Service Provider Modifiers Qty    77705094761  PT NEUROMUSC RE EDUCATION EA 15 MIN 2/28/2025 Emelia Hays, PT GP 1    [id number]  PT THER PROC EA 15 MIN 2/28/2025 Emelia Hays, PT GP 1    [id number]  PT THERAPEUTIC ACT EA 15 MIN 2/28/2025 Emelia Hays PT GP 1                      Emelia Hays PT  2/28/2025     "

## 2025-03-03 ENCOUNTER — HOSPITAL ENCOUNTER (OUTPATIENT)
Dept: PHYSICAL THERAPY | Facility: HOSPITAL | Age: 80
Setting detail: THERAPIES SERIES
Discharge: HOME | End: 2025-03-03
Payer: MEDICARE

## 2025-03-03 DIAGNOSIS — R42 DIZZINESS: ICD-10-CM

## 2025-03-03 DIAGNOSIS — Z74.09 IMPAIRED FUNCTIONAL MOBILITY, BALANCE, GAIT, AND ENDURANCE: Primary | ICD-10-CM

## 2025-03-03 PROCEDURE — 97110 THERAPEUTIC EXERCISES: CPT

## 2025-03-03 PROCEDURE — 97530 THERAPEUTIC ACTIVITIES: CPT

## 2025-03-03 NOTE — THERAPY TREATMENT NOTE
Outpatient Physical Therapy Neuro Treatment Note  [facility]     Patient Name: Kerrie Tyler  : 1945  MRN: [id number]  Today's Date: 3/3/2025      Visit Date: 2025    Visit Dx:    ICD-10-CM ICD-9-CM   1. Impaired functional mobility, balance, gait, and endurance  Z74.09 V49.89   2. Dizziness  R42 780.4       Patient Active Problem List   Diagnosis    Type 2 diabetes mellitus without complication, without long-term current use of insulin    Mixed hyperlipidemia    Essential hypertension    Coronary artery disease of bypass graft of native heart with stable angina pectoris    S/P CABG x 3    Benign prostatic hyperplasia with urinary hesitancy    Vitamin D deficiency    Presbycusis of both ears    Diabetic retinopathy associated with type 2 diabetes mellitus    Acute chest pain    Status post insertion of drug eluting coronary artery stent    Chest pain            PT Neuro       Row Name 25 1515             Subjective    Subjective Comments Feeling a little tired ttoday  -LB         Precautions and Contraindications    Precautions/Limitations fall precautions  -LB         Subjective Pain    Able to rate subjective pain? yes  -LB      Pre-Treatment Pain Level 0  -LB      Post-Treatment Pain Level 0  -LB         Vital Signs    Intra Treatment Diastolic   -LB      Post Systolic BP Rehab 73  -LB      Intratreatment Heart Rate (beats/min) 65  -LB         Cognition    Overall Cognitive Status WFL  -LB         Posture/Observations    Posture/Observations Comments pt ambulated up to unit, no AD.  Spouse present during session  -LB         Transfers    Sit-Stand [place] (Transfers) independent  -LB      Stand-Sit [place] (Transfers) independent  -LB         Gait/Stairs (Locomotion)    [place] Level (Gait) independent  -LB      Distance in Feet (Gait) 200  -LB      Pattern (Gait) step-through  -LB      Deviations/Abnormal Patterns (Gait) ace decreased;gait speed decreased;stride  length decreased  -LB      Left Sided Gait Deviations heel strike decreased  -LB                User Key  (r) = Recorded By, (t) = Taken By, (c) = Cosigned By      Initials Name Provider Type    Emelia Raphael PT Physical Therapist                             PT Assessment/Plan       Row Name 03/03/25 1605          PT Assessment    Assessment Comments Pt did well with his HEP.  Only provided a few cues for the exercise technique or simple as hand placement.  Pt did not have a loud voice during session today and fatigued slightly with walking.  Pt felt that it might be due to an increase in his Sinemet dosage.  -LB               User Key  (r) = Recorded By, (t) = Taken By, (c) = Cosigned By      Initials Name Provider Type    Emelia Raphael PT Physical Therapist                        OP Exercises       Row Name 03/03/25 1609 03/03/25 1515          Subjective    Subjective Comments -- Feeling a little tired ttoday  -LB        Subjective Pain    Able to rate subjective pain? -- yes  -LB     Pre-Treatment Pain Level -- 0  -LB     Post-Treatment Pain Level -- 0  -LB        Total Minutes    86817 - PT Therapeutic Exercise Minutes 30  -LB --     11124 - PT Therapeutic Activity Minutes 15  -LB --        Exercise 1    Exercise Name 1 -- [id number]  -LB     Cueing 1 -- Verbal;Demo  -LB     Reps 1 -- 5  -LB     Time 1 -- 10  -LB        Exercise 2    Exercise Name 2 -- Side to side  -LB     Cueing 2 -- Verbal;Demo  -LB     Reps 2 -- 5  -LB     Time 2 -- 10  -LB        Exercise 3    Exercise Name 3 -- Modified forward step and reach  -LB     Cueing 3 -- Verbal;Demo  -LB     Reps 3 -- 10  -LB        Exercise 4    Exercise Name 4 -- Modified sideways step and reach  -LB     Cueing 4 -- Verbal;Demo  -LB     Reps 4 -- 10  -LB        Exercise 5    Exercise Name 5 -- Modified backward step and reach  -LB     Cueing 5 -- Verbal;Demo  -LB     Reps 5 -- 10  -LB        Exercise 6    Exercise Name 6 -- Modified forward rock  and reach  -LB     Cueing 6 -- Verbal;Demo  -LB     Reps 6 -- 10  -LB        Exercise 7    Exercise Name 7 -- Modified sideways rock and reach  -LB     Cueing 7 -- Verbal;Demo  -LB     Reps 7 -- 10  -LB               User Key  (r) = Recorded By, (t) = Taken By, (c) = Cosigned By      Initials Name Provider Type    Emelia Raphael, PT Physical Therapist                                    Therapy Education  Education Details: Reviewed HEP  Given: HEP  Program: Reinforced  How Provided: Verbal, Demonstration, Written  Provided to: Patient  Level of Understanding: Verbalized, Demonstrated, Teach back education performed              Time Calculation:   Start Time: 1430  Stop Time: 1515  Time Calculation (min): 45 min  Timed Charges  44927 - PT Therapeutic Exercise Minutes: 30  19941 - PT Therapeutic Activity Minutes: 15  Total Minutes  Timed Charges Total Minutes: 45   Total Minutes: 45   Therapy Charges for Today       Code Description Service Date Service Provider Modifiers Qty    [id number]  PT THER PROC EA 15 MIN 3/3/2025 Emelia Hays, PT GP 2    [id number]  PT THERAPEUTIC ACT EA 15 MIN 3/3/2025 Emelia Hays, PT GP 1                      Emelia Hays PT  3/3/2025

## 2025-03-07 ENCOUNTER — HOSPITAL ENCOUNTER (OUTPATIENT)
Dept: SPEECH THERAPY | Facility: HOSPITAL | Age: 80
Setting detail: THERAPIES SERIES
Discharge: HOME | End: 2025-03-07
Payer: MEDICARE

## 2025-03-07 ENCOUNTER — HOSPITAL ENCOUNTER (OUTPATIENT)
Dept: PHYSICAL THERAPY | Facility: HOSPITAL | Age: 80
Setting detail: THERAPIES SERIES
Discharge: HOME | End: 2025-03-07
Payer: MEDICARE

## 2025-03-07 DIAGNOSIS — Z74.09 IMPAIRED FUNCTIONAL MOBILITY, BALANCE, GAIT, AND ENDURANCE: Primary | ICD-10-CM

## 2025-03-07 DIAGNOSIS — R47.1 DYSARTHRIA: ICD-10-CM

## 2025-03-07 DIAGNOSIS — R42 DIZZINESS: ICD-10-CM

## 2025-03-07 DIAGNOSIS — G20.A1 PARKINSON'S DISEASE, UNSPECIFIED WHETHER DYSKINESIA PRESENT, UNSPECIFIED WHETHER MANIFESTATIONS FLUCTUATE: Primary | ICD-10-CM

## 2025-03-07 PROCEDURE — 97530 THERAPEUTIC ACTIVITIES: CPT

## 2025-03-07 PROCEDURE — 92507 TX SP LANG VOICE COMM INDIV: CPT | Performed by: SPEECH-LANGUAGE PATHOLOGIST

## 2025-03-07 PROCEDURE — 97112 NEUROMUSCULAR REEDUCATION: CPT

## 2025-03-07 NOTE — THERAPY TREATMENT NOTE
Outpatient Speech Language Pathology   Adult Speech Language Cognitive Treatment Note  [facility]     Patient Name: Kerrie Tyelr  : 1945  MRN: [id number]  Today's Date: 3/7/2025         Visit Date: 2025   Patient Active Problem List   Diagnosis    Type 2 diabetes mellitus without complication, without long-term current use of insulin    Mixed hyperlipidemia    Essential hypertension    Coronary artery disease of bypass graft of native heart with stable angina pectoris    S/P CABG x 3    Benign prostatic hyperplasia with urinary hesitancy    Vitamin D deficiency    Presbycusis of both ears    Diabetic retinopathy associated with type 2 diabetes mellitus    Acute chest pain    Status post insertion of drug eluting coronary artery stent    Chest pain          Visit Dx:    ICD-10-CM ICD-9-CM   1. Parkinson's disease, unspecified whether dyskinesia present, unspecified whether manifestations fluctuate  G20.A1 332.0   2. Dysarthria  R47.1 784.51                              SLP OP Goals       Row Name 25 1500          Subjective Comments    Subjective Comments Patient is pleasant and cooperative  -KA        Subjective Pain    Able to rate subjective pain? yes  -KA     Pre-Treatment Pain Level 0  -KA     Post-Treatment Pain Level 0  -KA        Voice Goals    Patient will be able to use functional phonation 90%:;without cues  -KA     Status: Patient will be able to use functional phonation Progressing as expected  -KA     Comments: Patient will be able to use functional phonation Patient performed pitch glide exercises today x10 and x5 MPT averaging 16 seconds (WNL). Continued to target use of loud speech, inhalation prior to vocalization, maintaining loud speech and overarticulation while reading sentences of increased length. Patient read sentences of 5 to 7 words, 8 to 9 words, 10 plus words and paragraphs practicing strategies and required intermittent min cues to maintain loudness. Pt  independently perform inhalation beginning of the sentences and pauses as needed during 10 plus words and paragraphs every 8 to 10 words to inhale again. pt able to maintain loudness 80% without cues and 100% with min cues. Pt carried over at the conversation level 70% accuracy with min cues. SLP recorded pt to provide biofeedback.  -[place]     Comments: Pt will demonstrate an understanding of the structures and functions of the phonatory/respiratory tract (respiration, phonation, resonance and articulation) Pt forgot to bring this week and discussed he turn nob 1/4 of a turn and bring next week and to continue 5x a week.  -[place]               User Key  (r) = Recorded By, (t) = Taken By, (c) = Cosigned By      Initials Name Provider Type    Gilmar Larsen SLP Speech and Language Pathologist                           Time Calculation:   SLP Start Time: 1330  SLP Stop Time: 1415  SLP Time Calculation (min): 45 min  Untimed Charges  12327-BG Treatment/ST Modification Prosth Aug Alter : 45  Total Minutes  Untimed Charges Total Minutes: 45   Total Minutes: 45    Therapy Charges for Today       Code Description Service Date Service Provider Modifiers Qty    [id number]  ST TREATMENT SPEECH 3 3/7/2025 Gilmar Fernandez SLP GN 1                     TAMAR Encarnacion  3/7/2025

## 2025-03-07 NOTE — THERAPY PROGRESS REPORT/RE-CERT
Outpatient Physical Therapy Neuro Progress Note  [facility]     Patient Name: Kerrie Tyler  : 1945  MRN: [id number]  Today's Date: 3/7/2025      Visit Date: 2025    Visit Dx:    ICD-10-CM ICD-9-CM   1. Impaired functional mobility, balance, gait, and endurance  Z74.09 V49.89   2. Dizziness  R42 780.4       Patient Active Problem List   Diagnosis    Type 2 diabetes mellitus without complication, without long-term current use of insulin    Mixed hyperlipidemia    Essential hypertension    Coronary artery disease of bypass graft of native heart with stable angina pectoris    S/P CABG x 3    Benign prostatic hyperplasia with urinary hesitancy    Vitamin D deficiency    Presbycusis of both ears    Diabetic retinopathy associated with type 2 diabetes mellitus    Acute chest pain    Status post insertion of drug eluting coronary artery stent    Chest pain            PT Neuro       Row Name 25 1437             Subjective    Subjective Comments Spouse reports pt is walking better at home  -LB         Precautions and Contraindications    Precautions/Limitations fall precautions  -LB         Subjective Pain    Able to rate subjective pain? yes  -LB      Pre-Treatment Pain Level 0  -LB      Post-Treatment Pain Level 0  -LB         Vision-Basic Assessment    Current Vision No visual deficits  -LB         Cognition    Overall Cognitive Status WFL  -LB      Arousal/Alertness Appropriate responses to stimuli  -LB      Memory Appears intact  -LB      Orientation Level Oriented X4  -LB      Safety Judgment Good awareness of safety precautions  -LB      Deficits Fully aware of deficits  -LB         Perception    Inattention/Neglect Appears intact  -LB         Posture/Observations    Posture/Observations Comments pt ambulated up to unit, no AD.  -LB         Transfers    Sit-Stand [place] (Transfers) independent  -LB      Stand-Sit [place] (Transfers) independent  -LB         Gait/Stairs (Locomotion)     [place] Level (Gait) independent  -LB      Distance in Feet (Gait) 110  -LB      Pattern (Gait) step-through  -LB      Deviations/Abnormal Patterns (Gait) ace decreased;gait speed decreased;stride length decreased  -LB      Bilateral Gait Deviations decreased arm swing;heel strike decreased  -LB         Balance Skills Training    Standing-Level of Assistance Close supervision  -LB      Static Standing Balance Support No upper extremity supported  -LB      Standing-Balance Activities VSR - weight bearing/training mode;Rocker board  hitting a balloon while standing on rocker board  -LB      Standing Balance # of Minutes VSR for weight shifting with quick reaction and maintaining a position for a period of time.  -LB      Gait Balance # of Minutes Pt walked forward and backward while performing a bilateral UE activity.  -LB      Ankle Strategy Assessment (Balance) Walking with larger amplitude with 2# weights on forefoot to increase foot clearance  -LB      Stepping Strategy Assessment (Balance) Blaze pods set up to perform SLS with dual task with red on right and yellow with left; Reaching on the wall with same technique.  -LB                User Key  (r) = Recorded By, (t) = Taken By, (c) = Cosigned By      Initials Name Provider Type    LB Emelia Hays, PT Physical Therapist                             PT Assessment/Plan       Row Name 03/07/25 1486          PT Assessment    Functional Limitations Decreased safety during functional activities;Impaired gait;Limitations in community activities;Limitations in functional capacity and performance;Performance in leisure activities  -LB     Assessment Comments Pt has been seen for 7 visits with emphasis on increasing amplitude of LE and UE when ambulating especially armswing and heelstrike.  The patient is performing sit to stand transfer better with more forward weight shift and not needing to use UEs to complete the task.  The patient is compliant with HEP  at home.  Pt is practing gait with over exaggerated armswing and heelstrike which is translating to improvements.  Pt able to perform high level activities with CGA at most displaying good balance reactions.  The patient will lose some ace and speed when performing a dual task. Pt is benefiging from ongoing PT to improve mobility in the home and community.  -LB               User Key  (r) = Recorded By, (t) = Taken By, (c) = Cosigned By      Initials Name Provider Type    Emelia Raphael PT Physical Therapist                        OP Exercises       Row Name 03/07/25 1618 03/07/25 1437          Subjective    Subjective Comments -- Spouse reports pt is walking better at home  -LB        Subjective Pain    Able to rate subjective pain? -- yes  -LB     Pre-Treatment Pain Level -- 0  -LB     Post-Treatment Pain Level -- 0  -LB        Total Minutes    16048 -  PT Neuromuscular Reeducation Minutes 28  -LB --     91315 - PT Therapeutic Activity Minutes 17  -LB --               User Key  (r) = Recorded By, (t) = Taken By, (c) = Cosigned By      Initials Name Provider Type    Emelia Raphael PT Physical Therapist                                 PT OP Goals       Row Name 03/07/25 1600          PT Short Term Goals    STG 1 Pt to be indep with basic HEP for PD, strength  -LB     STG 1 Progress Met  -LB     STG 2 Pt to achieve STS from arm chair on first attempt 100% of attmepts.  -LB     STG 2 Progress Met  -LB     STG 3 Pt to consistently demonstate forward WS in transfers to decrease fall risk and improve efficiency with mobility.  -LB     STG 3 Progress Met  -LB        Long Term Goals    LTG Date to Achieve 04/07/25  -LB     LTG 1 Patient will be independent with proggressive HEP for PD management and balance.  -LB     LTG 1 Progress Ongoing  -LB     LTG 2 Pt to improve TUG to 14 sec to demonstrate improved gait quality and dec fall risk.  -LB     LTG 2 Progress Ongoing  -LB     LTG 3 Pt to improve 5 x STS to 25  seconds to demonstrate improved functional mobility and dec fall risk.  -LB     LTG 3 Progress Ongoing  -LB     LTG 4 Pt to achieve STS from armless chair wtihout UEs.  -LB     LTG 4 Progress Ongoing  -LB     LTG 5 Pt to achieve HS in gait B 90 % of the time.  -LB     LTG 5 Progress Ongoing  -LB        Time Calculation    PT Goal Re-Cert Due Date 04/04/25  -LB               User Key  (r) = Recorded By, (t) = Taken By, (c) = Cosigned By      Initials Name Provider Type    Emelia Raphael, PT Physical Therapist                                   Time Calculation:   Start Time: 1430  Stop Time: 1515  Time Calculation (min): 45 min  Timed Charges  15450 -  PT Neuromuscular Reeducation Minutes: 28  92013 - PT Therapeutic Activity Minutes: 17  Total Minutes  Timed Charges Total Minutes: 45   Total Minutes: 45   Therapy Charges for Today       Code Description Service Date Service Provider Modifiers Qty    [id number]  PT NEUROMUSC RE EDUCATION EA 15 MIN 3/7/2025 Emelia Hays, PT GP 2    [id number] HC PT THERAPEUTIC ACT EA 15 MIN 3/7/2025 Emelia Hays, PT GP 1                      Emelia Hays PT  3/7/2025

## 2025-03-10 ENCOUNTER — HOSPITAL ENCOUNTER (OUTPATIENT)
Dept: PHYSICAL THERAPY | Facility: HOSPITAL | Age: 80
Setting detail: THERAPIES SERIES
Discharge: HOME | End: 2025-03-10
Payer: MEDICARE

## 2025-03-10 DIAGNOSIS — Z74.09 IMPAIRED FUNCTIONAL MOBILITY, BALANCE, GAIT, AND ENDURANCE: Primary | ICD-10-CM

## 2025-03-10 DIAGNOSIS — R42 DIZZINESS: ICD-10-CM

## 2025-03-10 PROCEDURE — 97110 THERAPEUTIC EXERCISES: CPT

## 2025-03-10 PROCEDURE — 97112 NEUROMUSCULAR REEDUCATION: CPT

## 2025-03-10 NOTE — THERAPY TREATMENT NOTE
Outpatient Physical Therapy Neuro Treatment Note  [facility]     Patient Name: Kerrie Tyler  : 1945  MRN: [id number]  Today's Date: 3/10/2025      Visit Date: 03/10/2025    Visit Dx:    ICD-10-CM ICD-9-CM   1. Impaired functional mobility, balance, gait, and endurance  Z74.09 V49.89   2. Dizziness  R42 780.4       Patient Active Problem List   Diagnosis    Type 2 diabetes mellitus without complication, without long-term current use of insulin    Mixed hyperlipidemia    Essential hypertension    Coronary artery disease of bypass graft of native heart with stable angina pectoris    S/P CABG x 3    Benign prostatic hyperplasia with urinary hesitancy    Vitamin D deficiency    Presbycusis of both ears    Diabetic retinopathy associated with type 2 diabetes mellitus    Acute chest pain    Status post insertion of drug eluting coronary artery stent    Chest pain            PT Neuro       Row Name 03/10/25 1421             Subjective    Subjective Comments My legs are still a little sore from the last session  -LB         Precautions and Contraindications    Precautions/Limitations fall precautions  -LB         Subjective Pain    Able to rate subjective pain? yes  -LB      Pre-Treatment Pain Level 0  -LB      Subjective Pain Comment no pain, just soreness at the start of the session  -LB         Cognition    Overall Cognitive Status WFL  -LB         Posture/Observations    Posture/Observations Comments pt ambulated up to unit, no AD.  -LB         Bed Mobility    Supine-Sit [place] (Bed Mobility) independent  -LB      Sit-Supine [place] (Bed Mobility) independent  -LB         Transfers    Sit-Stand [place] (Transfers) independent  -LB      Stand-Sit [place] (Transfers) independent  -LB         Gait/Stairs (Locomotion)    Distance in Feet (Gait) 130  100  -LB      Pattern (Gait) step-through  -LB      Deviations/Abnormal Patterns (Gait) ace decreased;gait speed decreased;stride length  decreased  -LB      Bilateral Gait Deviations decreased arm swing;heel strike decreased  -LB         Balance Skills Training    Standing-Level of Assistance Close supervision  -LB      Static Standing Balance Support No upper extremity supported  -LB      Standing-Balance Activities VSR - weight bearing/training mode  -LB      Standing Balance # of Minutes pt stepping from one stepping stone to another with CGA; pt did display a stepping reaction to maintain balance; VS are in training mode set at 55 to 60% LOS and 8 seconds for transition.  -LB      Gait Balance-Level of Assistance Close supervision  -LB      Gait Balance Support No upper extremity supported  -LB      Gait Balance # of Minutes Pt walked forward and backward while performing a bilateral UE activity.  Patient able to maintain both ace and heel strike with activity today  -LB      SLS Pt standing on Airex and hitting blaze pods with either left or right LE , performed activity with CGA  -LB                User Key  (r) = Recorded By, (t) = Taken By, (c) = Cosigned By      Initials Name Provider Type    Emelia Raphael, PT Physical Therapist                             PT Assessment/Plan       Row Name 03/10/25 9208          PT Assessment    Assessment Comments Patient did request for higher level balance activities as performed in last session.  Patient did complain of some soreness and tightness in lower extremities after last session, discussed few gentle stretches he could perform.  Patient was able to maintain ace during walking while performing a dual task and overall fairly good heel strike.  When attempting to step on uneven surfaces patient did display a stepping reaction to maintain balance.  Patient with good reactions on VSR able to transition in less than 8 seconds at least 75% of the time.  While on VSR in weightbearing mode patient easily weight shifted posteriorly while maintaining his balance.  -LB               User Key  (r)  = Recorded By, (t) = Taken By, (c) = Cosigned By      Initials Name Provider Type    Emelia Raphael PT Physical Therapist                        OP Exercises       Row Name 03/10/25 1637 03/10/25 1421          Subjective    Subjective Comments -- My legs are still a little sore from the last session  -LB        Subjective Pain    Able to rate subjective pain? -- yes  -LB     Pre-Treatment Pain Level -- 0  -LB     Subjective Pain Comment -- no pain, just soreness at the start of the session  -LB        Total Minutes    96614 - PT Therapeutic Exercise Minutes 11  -LB --     74931 -  PT Neuromuscular Reeducation Minutes 31  -LB --        Exercise 8    Exercise Name 8 -- Sit to stand on Airex  -LB     Reps 8 -- 10  -LB     Additional Comments -- Patient holding 2.5 dowel oscar and hitting ball toss prior to sitting down  -LB        Exercise 13    Exercise Name 13 -- LTR:SKTC  -LB     Reps 13 -- 10  -LB        Exercise 14    Exercise Name 14 -- Bridges with lower extremity on therapy ball  -LB     Reps 14 -- 10  -LB               User Key  (r) = Recorded By, (t) = Taken By, (c) = Cosigned By      Initials Name Provider Type    Emelia Raphael, PT Physical Therapist                                    Therapy Education  Education Details: Gentle lower trunk stretches  Given: Symptoms/condition management  Program: New  How Provided: Verbal, Demonstration  Provided to: Patient  Level of Understanding: Verbalized, Teach back education performed, Demonstrated              Time Calculation:   Start Time: 1416  Stop Time: 1458  Time Calculation (min): 42 min  Timed Charges  47164 - PT Therapeutic Exercise Minutes: 11  35236 -  PT Neuromuscular Reeducation Minutes: 31  Total Minutes  Timed Charges Total Minutes: 42   Total Minutes: 42   Therapy Charges for Today       Code Description Service Date Service Provider Modifiers Qty    [id number]  PT NEUROMUSC RE EDUCATION EA 15 MIN 3/10/2025 Emelia Hays, PT GP 2     [id number]  PT THER PROC EA 15 MIN 3/10/2025 Emelia Hays, PT GP 1                      Emelia Hays, PT  3/10/2025

## 2025-03-13 RX ORDER — ROSUVASTATIN CALCIUM 10 MG/1
10 TABLET, COATED ORAL DAILY
Qty: 90 TABLET | Refills: 0 | Status: SHIPPED | OUTPATIENT
Start: 2025-03-13

## 2025-03-14 ENCOUNTER — HOSPITAL ENCOUNTER (OUTPATIENT)
Dept: SPEECH THERAPY | Facility: HOSPITAL | Age: 80
Setting detail: THERAPIES SERIES
Discharge: HOME | End: 2025-03-14
Payer: MEDICARE

## 2025-03-14 ENCOUNTER — HOSPITAL ENCOUNTER (OUTPATIENT)
Dept: PHYSICAL THERAPY | Facility: HOSPITAL | Age: 80
Setting detail: THERAPIES SERIES
Discharge: HOME | End: 2025-03-14
Payer: MEDICARE

## 2025-03-14 DIAGNOSIS — R47.1 DYSARTHRIA: ICD-10-CM

## 2025-03-14 DIAGNOSIS — Z74.09 IMPAIRED FUNCTIONAL MOBILITY, BALANCE, GAIT, AND ENDURANCE: Primary | ICD-10-CM

## 2025-03-14 DIAGNOSIS — G20.A1 PARKINSON'S DISEASE, UNSPECIFIED WHETHER DYSKINESIA PRESENT, UNSPECIFIED WHETHER MANIFESTATIONS FLUCTUATE: Primary | ICD-10-CM

## 2025-03-14 DIAGNOSIS — R42 DIZZINESS: ICD-10-CM

## 2025-03-14 PROCEDURE — 97530 THERAPEUTIC ACTIVITIES: CPT

## 2025-03-14 PROCEDURE — 97112 NEUROMUSCULAR REEDUCATION: CPT

## 2025-03-14 PROCEDURE — 92507 TX SP LANG VOICE COMM INDIV: CPT | Performed by: SPEECH-LANGUAGE PATHOLOGIST

## 2025-03-14 NOTE — THERAPY TREATMENT NOTE
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 Parkinson's disease, unspecified whether dyskinesia present, unspecified whether manifestations fluctuate  G20.A1 332.0   2. Dysarthria  R47.1 784.51                              SLP OP Goals       Row Name 25 1400          Subjective Comments    Subjective Comments Patient is pleasant and cooperative  -KA        Subjective Pain    Able to rate subjective pain? yes  -KA     Pre-Treatment Pain Level 0  -KA     Post-Treatment Pain Level 0  -KA        Voice Goals    Patient will be able to use functional phonation 90%:;without cues  -KA     Status: Patient will be able to use functional phonation Progressing as expected  -KA     Comments: Patient will be able to use functional phonation Patient performed pitch glide exercises today x10 and x5 MPT averaging 13.5 seconds . Continued to target use of loud speech, inhalation prior to vocalization, maintaining loud speech and overarticulation while reading sentences of increased length. Patient read sentences, 10 plus words and paragraphs practicing strategies and required intermittent min cues to maintain loudness. Pt independently perform inhalation beginging  of the sentences and pauses as needed during 10 plus words and paragraphs every 8 to 10 words to inhale again. pt able to maintain loudness 80% without cues and 100% with min cues. Pt carried over at the conversation level 80% accuracy with min cues. SLP recorded pt to provide biofeedback.  -KA     Pt will demonstrate an understanding of the structures and functions of the phonatory/respiratory tract (respiration, phonation, resonance and articulation) 90%:;without cues  -KA     Status: Pt will demonstrate an understanding of the structures and functions of the phonatory/respiratory tract (respiration, phonation, resonance and articulation) Progressing as expected  -KA     Comments: Pt will demonstrate an understanding of the structures and functions of the phonatory/respiratory tract (respiration, phonation, resonance and articulation) 5 sets of 5 breaths for a total of 25 breaths at 67.5cmh20 performed adequately.  -KA               User Key  (r) = Recorded By, (t) = Taken By, (c) = Cosigned By      Initials Name Provider Type    Gilmar Larsen SLP Speech and Language Pathologist                           Time Calculation:   SLP Start Time: 1336  SLP Stop Time: 1415  SLP Time Calculation (min): 39 min  Untimed Charges  71955-DR Treatment/ST Modification Prosth Aug Alter : 39  Total Minutes  Untimed Charges Total Minutes: 39   Total Minutes: 39    Therapy Charges for Today       Code Description Service Date Service Provider Modifiers Qty    51060467675  ST TREATMENT SPEECH 3 3/14/2025 Gilmar Fernandez SLP GN 1                     TAMAR Encarnacion  3/14/2025

## 2025-03-17 ENCOUNTER — HOSPITAL ENCOUNTER (OUTPATIENT)
Dept: PHYSICAL THERAPY | Facility: HOSPITAL | Age: 80
Setting detail: THERAPIES SERIES
Discharge: HOME | End: 2025-03-17
Payer: MEDICARE

## 2025-03-17 DIAGNOSIS — Z74.09 IMPAIRED FUNCTIONAL MOBILITY, BALANCE, GAIT, AND ENDURANCE: Primary | ICD-10-CM

## 2025-03-17 DIAGNOSIS — R42 DIZZINESS: ICD-10-CM

## 2025-03-17 PROCEDURE — 97112 NEUROMUSCULAR REEDUCATION: CPT

## 2025-03-17 PROCEDURE — 97110 THERAPEUTIC EXERCISES: CPT

## 2025-03-17 NOTE — THERAPY TREATMENT NOTE
Outpatient Physical Therapy Neuro Treatment Note  [facility]     Patient Name: Kerrie Tyler  : 1945  MRN: [id number]  Today's Date: 3/17/2025      Visit Date: 2025    Visit Dx:    ICD-10-CM ICD-9-CM   1. Impaired functional mobility, balance, gait, and endurance  Z74.09 V49.89   2. Dizziness  R42 780.4       Patient Active Problem List   Diagnosis    Type 2 diabetes mellitus without complication, without long-term current use of insulin    Mixed hyperlipidemia    Essential hypertension    Coronary artery disease of bypass graft of native heart with stable angina pectoris    S/P CABG x 3    Benign prostatic hyperplasia with urinary hesitancy    Vitamin D deficiency    Presbycusis of both ears    Diabetic retinopathy associated with type 2 diabetes mellitus    Acute chest pain    Status post insertion of drug eluting coronary artery stent    Chest pain            PT Neuro       Row Name 25 1440             Subjective    Subjective Comments Muscle sore after last session  -LB         Precautions and Contraindications    Precautions/Limitations fall precautions  -LB         Subjective Pain    Able to rate subjective pain? yes  -LB      Subjective Pain Comment sore  -LB         Cognition    Overall Cognitive Status WFL  -LB      Arousal/Alertness Appropriate responses to stimuli  -LB      Memory Appears intact  -LB      Orientation Level Oriented X4  -LB      Safety Judgment Good awareness of safety precautions  -LB      Deficits Fully aware of deficits  -LB         Posture/Observations    Posture/Observations Comments pt ambulated up to unit, no AD.  -LB         Transfers    Sit-Stand [place] (Transfers) independent  -LB      Stand-Sit [place] (Transfers) independent  -LB         Gait/Stairs (Locomotion)    [place] Level (Gait) independent  -LB      Distance in Feet (Gait) 125  -LB      Pattern (Gait) step-through  -LB      Deviations/Abnormal Patterns (Gait) ace  decreased;gait speed decreased;stride length decreased  -LB      Bilateral Gait Deviations decreased arm swing;heel strike decreased  -LB         Balance Skills Training    Standing-Level of Assistance Close supervision;Contact guard;Minimum assistance  -LB      Static Standing Balance Support No upper extremity supported  -LB      Standing-Balance Activities Foam square  -LB      Standing Balance # of Minutes Perform sit to stand on Airex while holding 2.5 pound dowel oscar at chest level.  Performed x 5 reps  -LB                User Key  (r) = Recorded By, (t) = Taken By, (c) = Cosigned By      Initials Name Provider Type    Emelia Raphael PT Physical Therapist                             PT Assessment/Plan       Row Name 03/17/25 1547          PT Assessment    Assessment Comments Patient did report muscle soreness after last session on Friday.  Performed and reviewed HEP with emphasis on increasing amplitude of upper extremities as well as trunk rotation.  Patient maintaining balance well with UE support while performing.  At times patient does appear to have some lightheadedness but is able to maintain activity safely.  Patient displays a good anterior weight shift with sit to stand as well as  displaying good initial standing balance with the ability to take a step quickly after coming to stand.  -LB               User Key  (r) = Recorded By, (t) = Taken By, (c) = Cosigned By      Initials Name Provider Type    Emelia Raphael PT Physical Therapist                        OP Exercises       Row Name 03/17/25 1550 03/17/25 1440          Subjective    Subjective Comments -- Muscle sore after last session  -LB        Subjective Pain    Able to rate subjective pain? -- yes  -LB     Subjective Pain Comment -- sore  -LB        Total Minutes    70969 - PT Therapeutic Exercise Minutes 28  -LB --     95828 -  PT Neuromuscular Reeducation Minutes 14  -LB --        Exercise 1    Exercise Name 1 -- [id number]  -LB      Cueing 1 -- Verbal  -LB     Reps 1 -- 5  -LB     Time 1 -- 10  -LB        Exercise 2    Exercise Name 2 -- Side to side  -LB     Cueing 2 -- Verbal  -LB     Reps 2 -- 5  -LB     Time 2 -- 10  -LB        Exercise 3    Exercise Name 3 -- Modified forward step and reach  -LB     Cueing 3 -- Verbal  -LB     Reps 3 -- 10  -LB        Exercise 4    Exercise Name 4 -- Modified sideways step and reach  -LB     Cueing 4 -- Verbal  -LB     Reps 4 -- 10  -LB        Exercise 5    Exercise Name 5 -- Modified backward step and reach  -LB     Cueing 5 -- Verbal  -LB     Reps 5 -- 10  -LB        Exercise 6    Exercise Name 6 -- Modified forward rock and reach  -LB     Cueing 6 -- Verbal  -LB     Reps 6 -- 10  -LB        Exercise 7    Exercise Name 7 -- Modified sideways rock and reach  -LB     Cueing 7 -- Verbal  -LB     Reps 7 -- 10  -LB        Exercise 8    Exercise Name 8 -- Sit to stand  -LB     Reps 8 -- 5  -LB     Additional Comments -- With stepping to a target on floor x 3  -LB        Exercise 9    Exercise Name 9 -- 1/4 turns  -LB     Reps 9 -- 7  -LB     Additional Comments -- Cues to increase amplitude of stepping  -LB               User Key  (r) = Recorded By, (t) = Taken By, (c) = Cosigned By      Initials Name Provider Type    Emelia Raphael, PT Physical Therapist                                    Therapy Education  Education Details: HEP  Given: HEP  Program: Reinforced  How Provided: Verbal, Demonstration, Written  Provided to: Patient  Level of Understanding: Verbalized, Demonstrated, Teach back education performed              Time Calculation:   Start Time: 1417  Stop Time: 1459  Time Calculation (min): 42 min  Timed Charges  06176 - PT Therapeutic Exercise Minutes: 28  31271 -  PT Neuromuscular Reeducation Minutes: 14  Total Minutes  Timed Charges Total Minutes: 42   Total Minutes: 42   Therapy Charges for Today       Code Description Service Date Service Provider Modifiers Qty    05652617590  PT  NEUROMUSC RE EDUCATION EA 15 MIN 3/17/2025 Emelia Hays, PT GP 1    71808441087 HC PT THER PROC EA 15 MIN 3/17/2025 Emelia Hays, PT GP 2                      Emelia Hays, PT  3/17/2025

## 2025-03-21 ENCOUNTER — HOSPITAL ENCOUNTER (OUTPATIENT)
Dept: PHYSICAL THERAPY | Facility: HOSPITAL | Age: 80
Setting detail: THERAPIES SERIES
Discharge: HOME | End: 2025-03-21
Payer: MEDICARE

## 2025-03-21 ENCOUNTER — HOSPITAL ENCOUNTER (OUTPATIENT)
Dept: SPEECH THERAPY | Facility: HOSPITAL | Age: 80
Setting detail: THERAPIES SERIES
Discharge: HOME | End: 2025-03-21
Payer: MEDICARE

## 2025-03-21 DIAGNOSIS — R42 DIZZINESS: ICD-10-CM

## 2025-03-21 DIAGNOSIS — R47.1 DYSARTHRIA: ICD-10-CM

## 2025-03-21 DIAGNOSIS — Z74.09 IMPAIRED FUNCTIONAL MOBILITY, BALANCE, GAIT, AND ENDURANCE: Primary | ICD-10-CM

## 2025-03-21 DIAGNOSIS — G20.A1 PARKINSON'S DISEASE, UNSPECIFIED WHETHER DYSKINESIA PRESENT, UNSPECIFIED WHETHER MANIFESTATIONS FLUCTUATE: Primary | ICD-10-CM

## 2025-03-21 PROCEDURE — 97530 THERAPEUTIC ACTIVITIES: CPT

## 2025-03-21 PROCEDURE — 92507 TX SP LANG VOICE COMM INDIV: CPT | Performed by: SPEECH-LANGUAGE PATHOLOGIST

## 2025-03-21 PROCEDURE — 97112 NEUROMUSCULAR REEDUCATION: CPT

## 2025-03-21 NOTE — THERAPY TREATMENT NOTE
Outpatient Speech Language Pathology   Adult Speech Language Cognitive Treatment Note  [facility]     Patient Name: Kerrie Tyler  : 1945  MRN: [id number]  Today's Date: 3/21/2025         Visit Date: 2025   Patient Active Problem List   Diagnosis    Type 2 diabetes mellitus without complication, without long-term current use of insulin    Mixed hyperlipidemia    Essential hypertension    Coronary artery disease of bypass graft of native heart with stable angina pectoris    S/P CABG x 3    Benign prostatic hyperplasia with urinary hesitancy    Vitamin D deficiency    Presbycusis of both ears    Diabetic retinopathy associated with type 2 diabetes mellitus    Acute chest pain    Status post insertion of drug eluting coronary artery stent    Chest pain          Visit Dx:    ICD-10-CM ICD-9-CM   1. Parkinson's disease, unspecified whether dyskinesia present, unspecified whether manifestations fluctuate  G20.A1 332.0   2. Dysarthria  R47.1 784.51                              SLP OP Goals       Row Name 25 1500          Subjective Comments    Subjective Comments Patient is pleasant and cooperative  -KA        Subjective Pain    Able to rate subjective pain? yes  -KA     Pre-Treatment Pain Level 0  -KA     Post-Treatment Pain Level 0  -KA        Voice Goals    Patient will be able to use functional phonation 90%:;without cues  -KA     Status: Patient will be able to use functional phonation Progressing as expected  -KA     Comments: Patient will be able to use functional phonation In the beginning of session patient was telling SLP about his son who was [place] from [place] of [place] and required consistent mod cues to increase loudness and overall poor carryover in conversation. Improvement end of session see below. Patient performed pitch glide exercises today x10 and x3 MPT averaging 13.5 seconds . Continued to target use of loud speech, inhalation prior to vocalization, maintaining loud speech and  overarticulation while reading sentences of increased length. Patient read sentences, 7 to 8 and10 plus words and paragraphs practicing strategies and required intermittent min cues to maintain loudness. Pt independently perform inhalation beginning of phonation and pauses as needed during 10 plus words and paragraphs every 8 to 10 words to inhale again. Pt able to maintain loudness 80% without cues and 100% with min cues. Pt carried over at the conversation level 80% accuracy with min cues. SLP recorded pt to provide biofeedback.  -KA     Pt will demonstrate an understanding of the structures and functions of the phonatory/respiratory tract (respiration, phonation, resonance and articulation) 90%:;without cues  -KA     Status: Pt will demonstrate an understanding of the structures and functions of the phonatory/respiratory tract (respiration, phonation, resonance and articulation) Progressing as expected  -KA     Comments: Pt will demonstrate an understanding of the structures and functions of the phonatory/respiratory tract (respiration, phonation, resonance and articulation) 5 sets of 5 breaths for a total of 25 breaths at 67.5cmh20 performed adequately. Pt completed first 5 sets at 82mdz89 however pt had difficulty blowing against this increase in resistance nob was turrned back to 67.5 and pt completed rest at 67.5cmh20  -KA               User Key  (r) = Recorded By, (t) = Taken By, (c) = Cosigned By      Initials Name Provider Type    Gilmar Larsen, SLP Speech and Language Pathologist                           Time Calculation:   SLP Start Time: 1330  SLP Stop Time: 1415  SLP Time Calculation (min): 45 min  Untimed Charges  14805-ES Treatment/ST Modification Prosth Aug Alter : 45  Total Minutes  Untimed Charges Total Minutes: 45   Total Minutes: 45    Therapy Charges for Today       Code Description Service Date Service Provider Modifiers Qty    02437773060 HC ST TREATMENT SPEECH 3 3/21/2025 Jim  Gilmar, SLP GN 1                     Gilmar Fernandez, TAMAR  3/21/2025

## 2025-03-21 NOTE — THERAPY TREATMENT NOTE
Outpatient Physical Therapy Neuro Treatment Note  [facility]     Patient Name: Kerrie Tyler  : 1945  MRN: [id number]  Today's Date: 3/21/2025      Visit Date: 2025    Visit Dx:    ICD-10-CM ICD-9-CM   1. Impaired functional mobility, balance, gait, and endurance  Z74.09 V49.89   2. Dizziness  R42 780.4       Patient Active Problem List   Diagnosis    Type 2 diabetes mellitus without complication, without long-term current use of insulin    Mixed hyperlipidemia    Essential hypertension    Coronary artery disease of bypass graft of native heart with stable angina pectoris    S/P CABG x 3    Benign prostatic hyperplasia with urinary hesitancy    Vitamin D deficiency    Presbycusis of both ears    Diabetic retinopathy associated with type 2 diabetes mellitus    Acute chest pain    Status post insertion of drug eluting coronary artery stent    Chest pain            PT Neuro       Row Name 25 1500 25 1437          Subjective    Subjective Comments -- Feeling off balance today  -LB        Precautions and Contraindications    Precautions/Limitations -- fall precautions  -LB        Subjective Pain    Able to rate subjective pain? -- yes  -LB     Post-Treatment Pain Level -- 0  -LB        Cognition    Overall Cognitive Status -- WFL  -LB     Arousal/Alertness -- Appropriate responses to stimuli  -LB     Memory -- Appears intact  -LB     Orientation Level -- Oriented X4  -LB     Safety Judgment -- Good awareness of safety precautions  -LB     Deficits -- Fully aware of deficits  -LB        Posture/Observations    Posture/Observations Comments -- pt ambulated up to unit, no AD.  -LB        Transfers    Sit-Stand [place] (Transfers) independent  -LB --     Stand-Sit [place] (Transfers) independent  -LB --     Comment, (Transfers) decreased weight shift noted on 1 sit to stand  -LB --        Gait/Stairs (Locomotion)    [place] Level (Gait) independent;modified independence  -LB --      Assistive Device (Gait) cane, quad;cane, quad tip  -LB --     Distance in Feet (Gait) 120  100  -LB --     Pattern (Gait) step-through  -LB --     Deviations/Abnormal Patterns (Gait) ace decreased;gait speed decreased;stride length decreased  -LB --     Bilateral Gait Deviations decreased arm swing;heel strike decreased  -LB --     Left Sided Gait Deviations heel strike decreased  -LB --        Balance Skills Training    Standing-Level of Assistance Contact guard;Minimum assistance  -LB --     Static Standing Balance Support No upper extremity supported  -LB --     Standing-Balance Activities --  -LB --     Standing Balance # of Minutes While standing on [employer] patient performed beanbag toss with reaching.  Occasionally posterior lean requiring min assist to maintain balance  -LB --     Stepping Strategy Assessment (Balance) resistive forward and backward/side step walking with weight provided by samina  -LB --     Balance Comments Obstacle course set up with patient having to step over objects; stand on compliant surfaces for 5 seconds; push off of objects; step from 1 compliant surface to another.    Patient performed all activities with standby assist  -LB --               User Key  (r) = Recorded By, (t) = Taken By, (c) = Cosigned By      Initials Name Provider Type    Emelia Raphael, PT Physical Therapist                             PT Assessment/Plan       Row Name 03/21/25 3360          PT Assessment    Assessment Comments Patient felt that his balance was slightly off today.  Patient did well on solid ground and only on much higher complexity of balance activity to the patient sustained a posterior lean requiring assist to maintain balance.  Resistive walking did engage a backward stepping reaction to maintain balance.  While performing dual tasks during obstacle course to mimic outdoor and community areas patient did have a tendency to maintain a narrow base of support.  Discussed balance  "strategies and patient improved during next round.  Cues to maintain increased amplitude of extremities during ambulation.  -LB               User Key  (r) = Recorded By, (t) = Taken By, (c) = Cosigned By      Initials Name Provider Type    Emelia Raphael PT Physical Therapist                        OP Exercises       Row Name 03/21/25 1603 03/21/25 1437          Subjective    Subjective Comments -- Feeling off balance today  -LB        Subjective Pain    Able to rate subjective pain? -- yes  -LB     Post-Treatment Pain Level -- 0  -LB        Total Minutes    32653 -  PT Neuromuscular Reeducation Minutes 31  -LB --     13568 - PT Therapeutic Activity Minutes 11  -LB --               User Key  (r) = Recorded By, (t) = Taken By, (c) = Cosigned By      Initials Name Provider Type    Emelia Raphael PT Physical Therapist                                    Therapy Education  Education Details: Discussed using a cane on \"bad\" days when balance feels off  Given: Mobility training, Symptoms/condition management  Program: New  How Provided: Verbal  Provided to: Patient  Level of Understanding: Verbalized, Demonstrated, Teach back education performed              Time Calculation:   Start Time: 1416  Stop Time: 1458  Time Calculation (min): 42 min  Timed Charges  45749 -  PT Neuromuscular Reeducation Minutes: 31  66035 - PT Therapeutic Activity Minutes: 11  Total Minutes  Timed Charges Total Minutes: 42   Total Minutes: 42   Therapy Charges for Today       Code Description Service Date Service Provider Modifiers Qty    59025275568  PT NEUROMUSC RE EDUCATION EA 15 MIN 3/21/2025 Emelia Hays, PT GP 2    [id number]  PT THERAPEUTIC ACT EA 15 MIN 3/21/2025 Emelia Hays, PT GP 1                      Emelia Hays PT  3/21/2025     "

## 2025-03-24 ENCOUNTER — HOSPITAL ENCOUNTER (OUTPATIENT)
Dept: PHYSICAL THERAPY | Facility: HOSPITAL | Age: 80
Setting detail: THERAPIES SERIES
Discharge: HOME | End: 2025-03-24
Payer: MEDICARE

## 2025-03-24 DIAGNOSIS — Z74.09 IMPAIRED FUNCTIONAL MOBILITY, BALANCE, GAIT, AND ENDURANCE: Primary | ICD-10-CM

## 2025-03-24 DIAGNOSIS — R42 DIZZINESS: ICD-10-CM

## 2025-03-24 DIAGNOSIS — M79.604 PAIN IN RIGHT LEG: ICD-10-CM

## 2025-03-24 PROCEDURE — 97530 THERAPEUTIC ACTIVITIES: CPT

## 2025-03-24 PROCEDURE — 97112 NEUROMUSCULAR REEDUCATION: CPT

## 2025-03-24 NOTE — THERAPY TREATMENT NOTE
Outpatient Physical Therapy Neuro Treatment Note  [facility]     Patient Name: Kerrie Tyler  : 1945  MRN: [id number]  Today's Date: 3/24/2025      Visit Date: 2025    Visit Dx:    ICD-10-CM ICD-9-CM   1. Impaired functional mobility, balance, gait, and endurance  Z74.09 V49.89   2. Dizziness  R42 780.4   3. Pain in right leg  M79.604 729.5       Patient Active Problem List   Diagnosis    Type 2 diabetes mellitus without complication, without long-term current use of insulin    Mixed hyperlipidemia    Essential hypertension    Coronary artery disease of bypass graft of native heart with stable angina pectoris    S/P CABG x 3    Benign prostatic hyperplasia with urinary hesitancy    Vitamin D deficiency    Presbycusis of both ears    Diabetic retinopathy associated with type 2 diabetes mellitus    Acute chest pain    Status post insertion of drug eluting coronary artery stent    Chest pain            PT Neuro       Row Name 25 1458             Subjective    Subjective Comments Doing okay, busy weekend with family  -LB         Precautions and Contraindications    Precautions/Limitations fall precautions  -LB         Subjective Pain    Able to rate subjective pain? yes  -LB      Pre-Treatment Pain Level 0  -LB      Post-Treatment Pain Level 0  -LB         Cognition    Overall Cognitive Status WFL  -LB         Posture/Observations    Posture/Observations Comments pt ambulated up to unit, no AD.  -LB         Transfers    Sit-Stand [place] (Transfers) independent  -LB      Stand-Sit [place] (Transfers) independent  -LB      Comment, (Transfers) decreased weight shift forward  -LB         Gait/Stairs (Locomotion)    [place] Level (Gait) independent  -LB      Distance in Feet (Gait) 100  50  -LB      Deviations/Abnormal Patterns (Gait) ace decreased;gait speed decreased;stride length decreased  -LB      Bilateral Gait Deviations decreased arm swing;heel strike decreased  -LB       Left Sided Gait Deviations heel strike decreased  when compared to right  -LB      Gait Assessment/Intervention Pt ambulated on treadmill for 5:00 at 1.0.  Cues to maintain left step length and heelstrike  -LB         Balance Skills Training    Standing-Level of Assistance Contact guard  -LB      Static Standing Balance Support No upper extremity supported  -LB      Standing-Balance Activities Foam square  -LB      Standing Balance # of Minutes Pt tapping random blaze pods with 1.5 sec pause; Cues for pt to pick feet up  -LB      Gait Balance # of Minutes Dual task with patient having to toss and catch bean bags walking forward and backeward  -LB      Hip Strategy Assessment (Balance) STS, no UE assist peform a step reach, reaching for a bean bag and tossing.  Reaching right and let  -LB      Stepping Strategy Assessment (Balance) Resistive forward and backward ambulation with resistance at waist with paragym  -LB                User Key  (r) = Recorded By, (t) = Taken By, (c) = Cosigned By      Initials Name Provider Type    Emelia Raphael PT Physical Therapist                             PT Assessment/Plan       Row Name 03/24/25 1523          PT Assessment    Assessment Comments Pt was able to improve his stepping reaction with resistive backwards walking to help train for a posterior lean.  Reaction to Blaze pods while standing on foam was a challenge to maintain SLS and stepping forward and backward, difficulting increasing height of step to clear the Airex.  Pt did well with dual task but did not maintain higher amplitude of LEs when performing dual tasks but did maintain balance.  When walking on treadmill, cues to increase left step length to match right.  -LB               User Key  (r) = Recorded By, (t) = Taken By, (c) = Cosigned By      Initials Name Provider Type    Emelia Raphael PT Physical Therapist                        OP Exercises       Row Name 03/24/25 6485 03/24/25 0348           Subjective    Subjective Comments -- Doing okay, busy weekend with family  -LB        Subjective Pain    Able to rate subjective pain? -- yes  -LB     Pre-Treatment Pain Level -- 0  -LB     Post-Treatment Pain Level -- 0  -LB        Total Minutes    94615 -  PT Neuromuscular Reeducation Minutes 33  -LB --     66914 - PT Therapeutic Activity Minutes 11  -LB --               User Key  (r) = Recorded By, (t) = Taken By, (c) = Cosigned By      Initials Name Provider Type    Emelia Raphael, PT Physical Therapist                                                   Time Calculation:   Start Time: 1415  Stop Time: 1459  Time Calculation (min): 44 min  Timed Charges  25076 -  PT Neuromuscular Reeducation Minutes: 33  03993 - PT Therapeutic Activity Minutes: 11  Total Minutes  Timed Charges Total Minutes: 44   Total Minutes: 44   Therapy Charges for Today       Code Description Service Date Service Provider Modifiers Qty    53977599981  PT NEUROMUSC RE EDUCATION EA 15 MIN 3/24/2025 Emelia Hays, PT GP 2    [id number]  PT THERAPEUTIC ACT EA 15 MIN 3/24/2025 Emelia Hays, PT GP 1                      Emelia Hays PT  3/24/2025

## 2025-03-28 ENCOUNTER — HOSPITAL ENCOUNTER (OUTPATIENT)
Dept: SPEECH THERAPY | Facility: HOSPITAL | Age: 80
Setting detail: THERAPIES SERIES
Discharge: HOME | End: 2025-03-28
Payer: MEDICARE

## 2025-03-28 ENCOUNTER — HOSPITAL ENCOUNTER (OUTPATIENT)
Dept: PHYSICAL THERAPY | Facility: HOSPITAL | Age: 80
Setting detail: THERAPIES SERIES
Discharge: HOME | End: 2025-03-28
Payer: MEDICARE

## 2025-03-28 DIAGNOSIS — R42 DIZZINESS: ICD-10-CM

## 2025-03-28 DIAGNOSIS — G20.A1 PARKINSON'S DISEASE, UNSPECIFIED WHETHER DYSKINESIA PRESENT, UNSPECIFIED WHETHER MANIFESTATIONS FLUCTUATE: Primary | ICD-10-CM

## 2025-03-28 DIAGNOSIS — R47.1 DYSARTHRIA: ICD-10-CM

## 2025-03-28 DIAGNOSIS — Z74.09 IMPAIRED FUNCTIONAL MOBILITY, BALANCE, GAIT, AND ENDURANCE: Primary | ICD-10-CM

## 2025-03-28 PROCEDURE — 97112 NEUROMUSCULAR REEDUCATION: CPT

## 2025-03-28 PROCEDURE — 92507 TX SP LANG VOICE COMM INDIV: CPT | Performed by: SPEECH-LANGUAGE PATHOLOGIST

## 2025-03-28 PROCEDURE — 97530 THERAPEUTIC ACTIVITIES: CPT

## 2025-03-28 NOTE — THERAPY TREATMENT NOTE
Outpatient Speech Language Pathology   Adult Speech Language Cognitive Progress Note  [facility]     Patient Name: Kerrie Tyler  : 1945  MRN: [id number]  Today's Date: 3/28/2025         Visit Date: 2025   Patient Active Problem List   Diagnosis    Type 2 diabetes mellitus without complication, without long-term current use of insulin    Mixed hyperlipidemia    Essential hypertension    Coronary artery disease of bypass graft of native heart with stable angina pectoris    S/P CABG x 3    Benign prostatic hyperplasia with urinary hesitancy    Vitamin D deficiency    Presbycusis of both ears    Diabetic retinopathy associated with type 2 diabetes mellitus    Acute chest pain    Status post insertion of drug eluting coronary artery stent    Chest pain          Visit Dx:    ICD-10-CM ICD-9-CM   1. Parkinson's disease, unspecified whether dyskinesia present, unspecified whether manifestations fluctuate  G20.A1 332.0   2. Dysarthria  R47.1 784.51        OP SLP Assessment/Plan - 25 1527          SLP Assessment    Functional Problems Speech Language- Adult/Cognition  -KA    Impact on Function: Adult Speech Language/Cognition Difficulty sequencing thoughts to express complex messages  -KA    Clinical Impression: Speech Language-Adult/Congnition Mild:;Dysarthria- Hypokinetic  -KA       SLP Plan    Frequency x1 week  -KA    Duration 2 weeks  -KA    Planned CPT's? SLP INDIVIDUAL SPEECH THERAPY: 38967  -KA    Expected Duration of Therapy Session (SLP Eval) 45  -KA              User Key  (r) = Recorded By, (t) = Taken By, (c) = Cosigned By      Initials Name Provider Type    Gilmar Larsen SLP Speech and Language Pathologist                                       SLP OP Goals       Row Name 25 1500          Subjective Comments    Subjective Comments Patient is pleasant and cooperative  -KA        Subjective Pain    Able to rate subjective pain? yes  -KA     Pre-Treatment Pain Level 0  -KA      Post-Treatment Pain Level 0  -KA        Voice Goals    Status: Patient will be able to engage in speech at the conversational level in all settings, using functional phonation, acceptable habitual pitch and balanced tone focus. Progressing as expected  -KA     Comments: Patient will be able to engage in speech at the conversational level in all settings, using functional phonation, acceptable habitual pitch and balanced tone focus. Reduced carryover of strategies at the conversation level with mod cues see detail below.  -KA     Patient will be able to use functional phonation 90%:;without cues  -KA     Status: Patient will be able to use functional phonation Progressing as expected  -KA     Comments: Patient will be able to use functional phonation In the beginning of session and during one minute breaks during EMST patient was telling SLP about his sons  who was visiting from out of state (two different sons and two different visits) and required consistent mod cues to increase loudness and overall reduced carryover of strategies in conversation. Improvement end of session see below. Patient performed pitch glide exercises today x10 and x3 MPT averaging 14.6 seconds with improvement today. Continued to target use of loud speech, inhalation prior to vocalization, maintaining loud speech and overarticulation while reading sentences of increased length. Patient read sentences, 7 to 8 and10 plus words and paragraphs practicing strategies and required intermittent min cues to maintain loudness. Pt independently perform inhalation beginning of phonation and pauses as needed during 10 plus words and paragraphs every 8 to 10 words to inhale again. Pt able to maintain loudness 80% without cues and 100% with min cues. Pt carried over at the conversation level 80% accuracy with min cues. SLP recorded pt to provide biofeedback. Pt implements strategies well end of session after practice and reinforcement, however in the  beginning of session pt demontrated reduced carryover with ongoing education and cues.  -KA     Pt will demonstrate an understanding of the structures and functions of the phonatory/respiratory tract (respiration, phonation, resonance and articulation) 90%:;without cues  -KA     Status: Pt will demonstrate an understanding of the structures and functions of the phonatory/respiratory tract (respiration, phonation, resonance and articulation) Progressing as expected  -KA     Comments: Pt will demonstrate an understanding of the structures and functions of the phonatory/respiratory tract (respiration, phonation, resonance and articulation) 5 sets of 5 breaths for a total of 25 breaths at 67.5cmh20 performed adequately. Pt unabe to achieve breaths at 33upi03 no airway achieved at this level and nob turned back to 67.5  -KA               User Key  (r) = Recorded By, (t) = Taken By, (c) = Cosigned By      Initials Name Provider Type    Gilmar Larsen SLP Speech and Language Pathologist                           Time Calculation:   SLP Start Time: 1330  SLP Stop Time: 1415  SLP Time Calculation (min): 45 min  Untimed Charges  97982-SN Treatment/ST Modification Prosth Aug Alter : 45  Total Minutes  Untimed Charges Total Minutes: 45   Total Minutes: 45    Therapy Charges for Today       Code Description Service Date Service Provider Modifiers Qty    32445649306 HC ST TREATMENT SPEECH 3 3/28/2025 Gilmar Fernandez SLP GN 1                     TAMAR Encarnacion  3/28/2025

## 2025-03-28 NOTE — THERAPY DISCHARGE NOTE
Outpatient Physical Therapy Neuro Treatment Note/Discharge Summary  [facility]     Patient Name: Kerrie Tyler  : 1945  MRN: [id number]  Today's Date: 3/28/2025      Visit Date: 2025    Visit Dx:    ICD-10-CM ICD-9-CM   1. Impaired functional mobility, balance, gait, and endurance  Z74.09 V49.89   2. Dizziness  R42 780.4       Patient Active Problem List   Diagnosis    Type 2 diabetes mellitus without complication, without long-term current use of insulin    Mixed hyperlipidemia    Essential hypertension    Coronary artery disease of bypass graft of native heart with stable angina pectoris    S/P CABG x 3    Benign prostatic hyperplasia with urinary hesitancy    Vitamin D deficiency    Presbycusis of both ears    Diabetic retinopathy associated with type 2 diabetes mellitus    Acute chest pain    Status post insertion of drug eluting coronary artery stent    Chest pain             PT Neuro       Row Name 25 7150             Subjective    Subjective Comments Reports feeling stiff in the morning, improved during day  -LB         Subjective Pain    Able to rate subjective pain? yes  -LB      Pre-Treatment Pain Level 0  -LB      Post-Treatment Pain Level 0  -LB         Cognition    Overall Cognitive Status WFL  -LB         Posture/Observations    Posture/Observations Comments pt ambulated up to unit, no AD.  -LB         Transfers    Sit-Stand [place] (Transfers) independent  -LB      Stand-Sit [place] (Transfers) independent  -LB      Transfer, Impairments strength decreased;impaired balance  -LB         Gait/Stairs (Locomotion)    [place] Level (Gait) independent  -LB      Assistive Device (Gait) --  no AD  -LB      Distance in Feet (Gait) 200  -LB      Pattern (Gait) step-through  -LB      Deviations/Abnormal Patterns (Gait) ace decreased;stride length decreased  -LB      Gait Assessment/Intervention Pt ambulated 200 ft maintaining heelstrike and step length and bilateral  armswing  -LB         Balance Skills Training    Standing-Level of Assistance Contact guard  -LB      Static Standing Balance Support Right upper extremity supported;Left upper extremity supported;No upper extremity supported  Transitioned from UE to  no UE support  -LB      Standing-Balance Activities Tandem Stance;Standing on 1/2 roll  -LB      Standing Balance # of Minutes Tandem on 1/2 roll on both sides.  Pt able to maintain balance without UE assist for ~15secs  -LB      Gait Balance-Level of Assistance Minimum assistance  -LB      Gait Balance Support No upper extremity supported  -LB      Gait Balance Activities foam beam  -LB                User Key  (r) = Recorded By, (t) = Taken By, (c) = Cosigned By      Initials Name Provider Type    Emelia Raphael PT Physical Therapist                             PT Assessment/Plan       Row Name 03/28/25 8945          PT Assessment    Assessment Comments Pt has been seen in PT with emphasis on balance, strength and endurance.  The patient has made good progress and is performing high level balance activities during therapy.  Without UE assist the patient is displaying improved balance reactions with ankle knee and hip.  The patient is indep with HEP for improving amplitude of movements including UE and LEs.  The patient is walking with improved heelstrike, stride length and heelstrike.  Pt showed improvement on TUG from 21 to 14.  Initially pt peformed 5 STS in 40 secs with UE assist and today completed in 17 secs without UE assist.  PT encouraged to maintain a level of activity with exercises to walking.  Pt voiced understanding.  -LB               User Key  (r) = Recorded By, (t) = Taken By, (c) = Cosigned By      Initials Name Provider Type    Emelia Raphael PT Physical Therapist                          OP Exercises       Row Name 03/28/25 8450 03/28/25 1459          Subjective    Subjective Comments -- Reports feeling stiff in the morning, improved during  day  -LB        Subjective Pain    Able to rate subjective pain? -- yes  -LB     Pre-Treatment Pain Level -- 0  -LB     Post-Treatment Pain Level -- 0  -LB        Total Minutes    62732 -  PT Neuromuscular Reeducation Minutes 27  -LB --     07829 - PT Therapeutic Activity Minutes 16  -LB --               User Key  (r) = Recorded By, (t) = Taken By, (c) = Cosigned By      Initials Name Provider Type    Emelia Raphael PT Physical Therapist                                   PT OP Goals       Row Name 03/28/25 1500          Long Term Goals    LTG 1 Patient will be independent with proggressive HEP for PD management and balance.  -LB     LTG 1 Progress Met  -LB     LTG 2 Pt to improve TUG to 14 sec to demonstrate improved gait quality and dec fall risk.  -LB     LTG 2 Progress Met  -LB     LTG 3 Pt to improve 5 x STS to 25 seconds to demonstrate improved functional mobility and dec fall risk.  -LB     LTG 3 Progress Comments 17  -LB     LTG 4 Pt to achieve STS from armless chair wtihout UEs.  -LB     LTG 4 Progress Met  -LB     LTG 5 Pt to achieve HS in gait B 90 % of the time.  -LB     LTG 5 Progress Met  -LB               User Key  (r) = Recorded By, (t) = Taken By, (c) = Cosigned By      Initials Name Provider Type    Emelia Raphael PT Physical Therapist                    Therapy Education  Education Details: Recommend daily ther ex/activity  Given: HEP  Program: Reinforced  How Provided: Verbal  Provided to: Patient  Level of Understanding: Verbalized, Teach back education performed    5 Times Sit to Stand  5 Times Sit to Stand (seconds): 17 seconds  Timed Up and Go (TUG)  TUG Test 1: 13 seconds    Time Calculation:   Start Time: 1416  Stop Time: 1459  Time Calculation (min): 43 min  Timed Charges  30367 -  PT Neuromuscular Reeducation Minutes: 27  63166 - PT Therapeutic Activity Minutes: 16  Total Minutes  Timed Charges Total Minutes: 43   Total Minutes: 43     Therapy Charges for Today       Code Description  Service Date Service Provider Modifiers Qty    [id number]  PT NEUROMUSC RE EDUCATION EA 15 MIN 3/28/2025 Emelia Hays, PT GP 2    [id number] HC PT THERAPEUTIC ACT EA 15 MIN 3/28/2025 Emelia Hays, PT GP 1            PT G-Codes  TUG Test 1: 13 seconds     OP PT Discharge Summary  Date of Discharge: 03/28/25  Reason for Discharge: All goals achieved, Independent, Maximum functional potential achieved  Outcomes Achieved: Able to achieve all goals within established timeline  Discharge Destination: Home with home program        Emelia Hays, PT  3/28/2025

## 2025-03-31 ENCOUNTER — APPOINTMENT (OUTPATIENT)
Dept: PHYSICAL THERAPY | Facility: HOSPITAL | Age: 80
End: 2025-03-31
Payer: MEDICARE

## 2025-04-04 ENCOUNTER — APPOINTMENT (OUTPATIENT)
Dept: PHYSICAL THERAPY | Facility: HOSPITAL | Age: 80
End: 2025-04-04
Payer: MEDICARE

## 2025-04-04 ENCOUNTER — HOSPITAL ENCOUNTER (OUTPATIENT)
Dept: SPEECH THERAPY | Facility: HOSPITAL | Age: 80
Setting detail: THERAPIES SERIES
Discharge: HOME | End: 2025-04-04
Payer: MEDICARE

## 2025-04-04 DIAGNOSIS — R47.1 DYSARTHRIA: ICD-10-CM

## 2025-04-04 DIAGNOSIS — G20.A1 PARKINSON'S DISEASE, UNSPECIFIED WHETHER DYSKINESIA PRESENT, UNSPECIFIED WHETHER MANIFESTATIONS FLUCTUATE: Primary | ICD-10-CM

## 2025-04-04 PROCEDURE — 92507 TX SP LANG VOICE COMM INDIV: CPT

## 2025-04-04 NOTE — THERAPY TREATMENT NOTE
Outpatient Speech Language Pathology   Adult Speech Language Cognitive Treatment Note  [facility]     Patient Name: Kerrie Tyler  : 1945  MRN: [id number]  Today's Date: 2025         Visit Date: 2025   Patient Active Problem List   Diagnosis    Type 2 diabetes mellitus without complication, without long-term current use of insulin    Mixed hyperlipidemia    Essential hypertension    Coronary artery disease of bypass graft of native heart with stable angina pectoris    S/P CABG x 3    Benign prostatic hyperplasia with urinary hesitancy    Vitamin D deficiency    Presbycusis of both ears    Diabetic retinopathy associated with type 2 diabetes mellitus    Acute chest pain    Status post insertion of drug eluting coronary artery stent    Chest pain          Visit Dx:    ICD-10-CM ICD-9-CM   1. Parkinson's disease, unspecified whether dyskinesia present, unspecified whether manifestations fluctuate  G20.A1 332.0   2. Dysarthria  R47.1 784.51                              SLP OP Goals       Row Name 25 1500          Subjective Comments    Subjective Comments Pt brings in [id number]. Pt is very pleasant.  -BB        Dysarthria Goals    Patient will apply compensatory strategies to improve intelligibility of speech during in spontaneous speech 90%:;without cues  -BB     Status: Patient will apply compensatory strategies to improve intelligibility of speech during in spontaneous speech Progressing as expected  -BB     Comments: Patient will apply compensatory strategies to improve intelligibility of speech during in spontaneous speech Unstructured conversation task with prompt to increase vocal loudness: MIN A overall to maintain 72dB (as measured by decibel meter).  -BB        Voice Goals    Pt will demonstrate an understanding of the structures and functions of the phonatory/respiratory tract (respiration, phonation, resonance and articulation) 90%:;without cues  -BB     Status: Pt will demonstrate  an understanding of the structures and functions of the phonatory/respiratory tract (respiration, phonation, resonance and articulation) Progressing as expected  -BB     Comments: Pt will demonstrate an understanding of the structures and functions of the phonatory/respiratory tract (respiration, phonation, resonance and articulation) EMST: set to [id number] - 5 sets of 5.  -BB               User Key  (r) = Recorded By, (t) = Taken By, (c) = Cosigned By      Initials Name Provider Type    Cortez Patterson SLP Speech and Language Pathologist                           Time Calculation:   SLP Start Time: 1330  SLP Stop Time: 1415  SLP Time Calculation (min): 45 min  Untimed Charges  08746-HR Treatment/ST Modification Prosth Aug Alter : 45  Total Minutes  Untimed Charges Total Minutes: 45   Total Minutes: 45    Therapy Charges for Today       Code Description Service Date Service Provider Modifiers Qty    [id number]  ST TREATMENT SPEECH 3 4/4/2025 Cortez Gastelum, SLP GN 1                     TAMAR Alfred  4/4/2025

## 2025-04-07 ENCOUNTER — APPOINTMENT (OUTPATIENT)
Dept: PHYSICAL THERAPY | Facility: HOSPITAL | Age: 80
End: 2025-04-07
Payer: MEDICARE

## 2025-04-11 ENCOUNTER — APPOINTMENT (OUTPATIENT)
Dept: PHYSICAL THERAPY | Facility: HOSPITAL | Age: 80
End: 2025-04-11
Payer: MEDICARE

## 2025-04-11 ENCOUNTER — HOSPITAL ENCOUNTER (OUTPATIENT)
Dept: SPEECH THERAPY | Facility: HOSPITAL | Age: 80
Setting detail: THERAPIES SERIES
Discharge: HOME | End: 2025-04-11
Payer: MEDICARE

## 2025-04-11 DIAGNOSIS — R47.1 DYSARTHRIA: ICD-10-CM

## 2025-04-11 DIAGNOSIS — G20.A1 PARKINSON'S DISEASE, UNSPECIFIED WHETHER DYSKINESIA PRESENT, UNSPECIFIED WHETHER MANIFESTATIONS FLUCTUATE: Primary | ICD-10-CM

## 2025-04-11 PROCEDURE — 92507 TX SP LANG VOICE COMM INDIV: CPT | Performed by: SPEECH-LANGUAGE PATHOLOGIST

## 2025-04-11 NOTE — THERAPY DISCHARGE NOTE
Outpatient Speech Language Pathology   Adult Speech Language Cognitive Treatment Note/Discharge Summary  [facility]     Patient Name: Kerrie Tyler  : 1945  MRN: [id number]  Today's Date: 2025         Visit Date: 2025   Patient Active Problem List   Diagnosis    Type 2 diabetes mellitus without complication, without long-term current use of insulin    Mixed hyperlipidemia    Essential hypertension    Coronary artery disease of bypass graft of native heart with stable angina pectoris    S/P CABG x 3    Benign prostatic hyperplasia with urinary hesitancy    Vitamin D deficiency    Presbycusis of both ears    Diabetic retinopathy associated with type 2 diabetes mellitus    Acute chest pain    Status post insertion of drug eluting coronary artery stent    Chest pain          Visit Dx:    ICD-10-CM ICD-9-CM   1. Parkinson's disease, unspecified whether dyskinesia present, unspecified whether manifestations fluctuate  G20.A1 332.0   2. Dysarthria  R47.1 784.51                            SLP OP Goals       Row Name 25 1500          Subjective Comments    Subjective Comments Patient is pleasant and cooperative  -KA        Subjective Pain    Able to rate subjective pain? yes  -KA     Pre-Treatment Pain Level 0  -KA     Post-Treatment Pain Level 0  -KA        Dysarthria Goals    Status: Patient will be able to communicate a message that is comprehensible to familiar/unfamiliar listeners utilizing verbal speech and augmentative strategies Discontinued  -KA        Voice Goals    Status: Patient will be able to engage in speech at the conversational level in all settings, using functional phonation, acceptable habitual pitch and balanced tone focus. Achieved  -KA     Comments: Patient will be able to engage in speech at the conversational level in all settings, using functional phonation, acceptable habitual pitch and balanced tone focus. Today during spontaneous conversation pt able to maintain  "carryover of increased loudness with minimal cues required, improvement since last session. pt reported to day he felt \"satisifed\" and feels his breath support has increased and overall feels he is making increased effort to increase loudness.  -KA     Patient will be able to use functional phonation 90%:;without cues  -KA     Status: Patient will be able to use functional phonation Achieved  -KA     Comments: Patient will be able to use functional phonation Patient performed pitch glide exercises today x10 and x3 MPT averaging 16 seconds (improvement today last week averaged 14.5) with improvement today. Continued to target use of loud speech, inhalation prior to vocalization, maintaining loud speech and overarticulation while reading sentences of increased length. Patient read sentences, 7 to 8 and10 plus words and paragraphs practicing strategies and required intermittent min cues to maintain loudness. Pt independently perform inhalation beginning of phonation and pauses as needed during 10 plus words and paragraphs every 8 to 10 words Pt able to maintain loudness 100% and able to maintain use of strategies at the conversation level today with minimal cues, improvement today.  -KA     Pt will demonstrate an understanding of the structures and functions of the phonatory/respiratory tract (respiration, phonation, resonance and articulation) 90%:;without cues  -KA     Status: Pt will demonstrate an understanding of the structures and functions of the phonatory/respiratory tract (respiration, phonation, resonance and articulation) Achieved  -KA     Comments: Pt will demonstrate an understanding of the structures and functions of the phonatory/respiratory tract (respiration, phonation, resonance and articulation) EMST: set to [id number] - 5 sets of 5. Pt did not want to increase resistance today  -KA               User Key  (r) = Recorded By, (t) = Taken By, (c) = Cosigned By      Initials Name Provider Type    [place] " Gilmar Fernandez SLP Speech and Language Pathologist                                     Time Calculation:   SLP Start Time: 1330  SLP Stop Time: 1415  SLP Time Calculation (min): 45 min  Untimed Charges  [id number]-[id number] Treatment/ST Modification Prosth Aug Alter : 45  Total Minutes  Untimed Charges Total Minutes: 45   Total Minutes: 45    Therapy Charges for Today       Code Description Service Date Service Provider Modifiers Qty    [id number] HC ST TREATMENT SPEECH 3 4/11/2025 Gilmar Fernandez SLP GN 1                   OP SLP Discharge Summary  Date of Discharge: 04/11/25  Reason for Discharge: all goals and outcomes met, no further needs identified  Progress Toward Achieving Short/long Term Goals: all goals met within established timelines  Discharge Destination: home  Discharge Instructions: To continue home exericise program of EMST, voice exercises, and increasining/maintaining loudness when conversing with continued inhalation prior to phonation.      TAMAR Encarnacion  4/11/2025

## 2025-04-28 ENCOUNTER — OFFICE VISIT (OUTPATIENT)
Dept: NEUROLOGY | Facility: CLINIC | Age: 80
End: 2025-04-28
Payer: MEDICARE

## 2025-04-28 VITALS
HEIGHT: 66 IN | DIASTOLIC BLOOD PRESSURE: 62 MMHG | WEIGHT: 150 LBS | HEART RATE: 63 BPM | SYSTOLIC BLOOD PRESSURE: 110 MMHG | OXYGEN SATURATION: 97 % | BODY MASS INDEX: 24.11 KG/M2

## 2025-04-28 DIAGNOSIS — G20.A1 PARKINSON'S DISEASE WITHOUT DYSKINESIA OR FLUCTUATING MANIFESTATIONS: Primary | ICD-10-CM

## 2025-04-28 RX ORDER — CARBIDOPA AND LEVODOPA 25; 100 MG/1; MG/1
1 TABLET ORAL 3 TIMES DAILY
Qty: 90 TABLET | Refills: 5 | Status: SHIPPED | OUTPATIENT
Start: 2025-04-28

## 2025-04-28 RX ORDER — CARBIDOPA AND LEVODOPA 50; 200 MG/1; MG/1
1 TABLET, EXTENDED RELEASE ORAL NIGHTLY
Qty: 30 TABLET | Refills: 5 | Status: SHIPPED | OUTPATIENT
Start: 2025-04-28

## 2025-04-28 NOTE — PROGRESS NOTES
NAME: Kerrie Tyler   : 1945    Chief Complaint   Patient presents with    Parkinson's disease without dyskinesia or fluctuating manif        HPI:  Kerrie Tyler is a 79 y.o. right-handed male who I am seeing in follow-up regarding Parkinson's disease.  He is accompanied by his wife who adds to history.  I last saw him on 2025, with the following history taken from that note with additions/modifications as indicated:    The patient states that in May of last year he had COVID and then afterwards he feels that his steps have slowly been getting shorter and that he has developed reduced voice projection and slowed movements along with some resting tremor of the left hand. The patient denies any family history of a similar finding. He is a retired ENT.      Patient states that his memory is sharp and they denies any visual hallucinations. He denies numbness or tingling in the hands or feet. He has history of a fall 2023 landing on his face without loss of consciousness. He states that a head CT was obtained which was negative for a bleed. About 2 months later he fell again this time it was at home at night with fracture of the right tibia.       Patient states he is doing better after starting carbidopa/levodopa 1 tablet 3 times a day.  He takes his meds at 7/2/10 without any side effects.  He rarely notices a left hand tremor that is worse with stress. He states that his shuffling gait is better. He denies any freezing or festination's. He states he is now able to stand up the first time when before it took him 3 or 4 tries. He states physical therapy and speech therapy has also helped.     History interim    Patient states he has noticed some improvements after starting carbidopa/levodopa CR 50/200 mg before bed.  He states turning over in bed at night is easier as well as getting out of bed in the morning.  He states that his balance is still not great but it is better and they have been able to walk  more at the park with the nicer weather.  He denies any recent falls and states that he is very cautious and slow.  He denies any shuffling, freezing or festination's.  He rarely notices a left hand resting tremor but occasionally will feel an internal tremor in that left arm with stress.  He denies any trouble sleeping, vivid dreams or hallucinations.  He denies any memory concerns.  He denies any dizziness.  He completed speech therapy and feels like he is projecting his voice better as well as singing better and louder.  He continues speech therapy and physical therapy exercises at home.  He continues to take carbidopa/levodopa 25/100 mg 1 tablet 3 times a day and adjusted his times to 7/2/6 and takes the controlled release around 10.      Past Medical History:   Diagnosis Date    Allergic     Coronary artery disease 1994    CABAG    Diabetes mellitus 2012    Type2. Controlled    Difficulty walking 4 months    Hyperlipidemia 1994    Controlled    Kidney stone     Movement disorder 4 months    Myocardial infarction          Past Surgical History:   Procedure Laterality Date    CARDIAC CATHETERIZATION N/A 08/24/2022    Procedure: Left Heart Cath  FEMORAL ACCESS;  Surgeon: Jam Gavin MD;  Location: [facility] INVASIVE LOCATION;  Service: Cardiovascular;  Laterality: N/A;  Use femoral access- patient has bilateral CAMERON's with previous CABG    CARDIAC CATHETERIZATION N/A 08/24/2022    Procedure: Left ventriculography;  Surgeon: Jam Gavin MD;  Location: [facility] CATH INVASIVE LOCATION;  Service: Cardiovascular;  Laterality: N/A;    CARDIAC CATHETERIZATION N/A 08/24/2022    Procedure: Native mammary injection;  Surgeon: Jam Gavin MD;  Location: [facility] CATH INVASIVE LOCATION;  Service: Cardiovascular;  Laterality: N/A;    CARDIAC CATHETERIZATION  08/24/2022    Procedure: RESTING FULL CYCLE RATIO;  Surgeon: Jam Gavin MD;  Location: [facility] CATH INVASIVE LOCATION;  Service: Cardiovascular;;     CARDIAC CATHETERIZATION N/A 08/24/2022    Procedure: Stent LIZBET bypass graft;  Surgeon: Jam Gavin MD;  Location:  [facility] CATH INVASIVE LOCATION;  Service: Cardiovascular;  Laterality: N/A;    CARDIAC CATHETERIZATION N/A 08/24/2022    Procedure: Coronary angiography;  Surgeon: Jam Gavin MD;  Location:  [facility] CATH INVASIVE LOCATION;  Service: Cardiovascular;  Laterality: N/A;    CARDIAC CATHETERIZATION N/A 08/24/2022    Procedure: Percutaneous Coronary Intervention;  Surgeon: Jam Gavin MD;  Location:  [facility] CATH INVASIVE LOCATION;  Service: Cardiovascular;  Laterality: N/A;    CARDIAC SURGERY  1994    CATARACT EXTRACTION, BILATERAL Bilateral 1995    CORONARY ARTERY BYPASS GRAFT  1994    3 vessel    CORONARY STENT PLACEMENT  August 2022    EXTRACORPOREAL SHOCK WAVE LITHOTRIPSY (ESWL) Bilateral 2004    EYE SURGERY  1995    TONSILLECTOMY             Current Outpatient Medications:     aspirin 81 MG EC tablet, Take 1 tablet by mouth Daily., Disp: 30 tablet, Rfl: 11    carbidopa-levodopa (SINEMET)  MG per tablet, Take 1 tablet by mouth 3 (Three) Times a Day., Disp: 90 tablet, Rfl: 5    carbidopa-levodopa CR (SINEMET CR)  MG per CR tablet, Take 1 tablet by mouth Every Night., Disp: 30 tablet, Rfl: 5    finasteride (PROSCAR) 5 MG tablet, Take 1 tablet by mouth Daily., Disp: , Rfl:     glucose blood (Accu-Chek Guide) test strip, 1 each by Other route 3 (Three) Times a Day. Use as instructed, Disp: 270 each, Rfl: 3    glucose monitor monitoring kit, 1 each Daily., Disp: 1 each, Rfl: 1    metFORMIN ER (GLUCOPHAGE-XR) 500 MG 24 hr tablet, TAKE 1 TABLET BY MOUTH TWICE DAILY, Disp: 180 tablet, Rfl: 3    metoprolol tartrate (LOPRESSOR) 25 MG tablet, TAKE HALF A TABLET BY MOUTH TWICE DAILY, Disp: 180 tablet, Rfl: 3    rosuvastatin (CRESTOR) 10 MG tablet, TAKE 1 TABLET BY MOUTH DAILY, Disp: 90 tablet, Rfl: 0    tamsulosin (FLOMAX) 0.4 MG capsule 24 hr capsule, TAKE 1 CAPSULE BY MOUTH DAILY, Disp: 90  "capsule, Rfl: 3    trandolapril (MAVIK) 4 MG tablet, TAKE 1 TABLET BY MOUTH TWICE DAILY, Disp: 180 tablet, Rfl: 1      Family History   Problem Relation Age of Onset    Heart attack Mother              Diabetes Father     Heart disease Father         [id number]    Hypertension Father     Hyperlipidemia Father     Hypertension Sister     Hyperlipidemia Sister     Heart disease Sister         CABAG     Diabetes Brother     Heart disease Brother         Also Bypass     Hypertension Brother     Hyperlipidemia Brother     Prostate cancer Neg Hx          Social History     Socioeconomic History    Marital status:    Tobacco Use    Smoking status: Former     Current packs/day: 0.00     Average packs/day: 0.5 packs/day for 12.0 years (6.0 ttl pk-yrs)     Types: Cigarettes     Start date: 1982     Quit date: 1994     Years since quittin.3     Passive exposure: Never    Smokeless tobacco: Never   Vaping Use    Vaping status: Never Used   Substance and Sexual Activity    Alcohol use: Never    Drug use: Never    Sexual activity: Not Currently     Partners: Female         Allergies   Allergen Reactions    Sulfa Antibiotics Rash, Itching, Unknown - Low Severity and Other (See Comments)    Trimethoprim Rash         Pain Scale: 0/10        ROS:  Review of Systems   Musculoskeletal:  Positive for gait problem.   Neurological:  Positive for tremors. Negative for dizziness and weakness.   Psychiatric/Behavioral:  Negative for decreased concentration, hallucinations and sleep disturbance.          Physical Exam:  Vitals:    25 1358   BP: 110/62   Pulse: 63   SpO2: 97%   Weight: 68 kg (150 lb)   Height: 167.6 cm (65.98\")       Orthostatic BP:       Physical Exam  General: Well-developed male no acute distress  HEENT: Normocephalic no evidence of trauma.  Hypomimia and reduced lid blink  Neck: Supple.    Heart: Irregularly irregular rhythm  Extremities: Radial pulses strong and simultaneous.  "       Neurological Exam:   Mental Status: Awake, alert, oriented to person, place and time.  Conversant without evidence of an affective disorder, thought disorder, delusions or hallucinations.  Attention span and concentration are normal.  HCF: No aphasia, apraxia or dysarthria.  Recent and remote memory intact.  Knowledge of recent events intact.  CN: I:   II: Visual fields full without left inattention   III, IV, VI: Eye movements intact without nystagmus or ptosis.  Pupils equal round and reactive to light.   V,VII: Light touch and pinprick intact all 3 divisions of V.  Facial muscles symmetrical.   VIII: Hearing intact to finger rub   IX,X: Soft palate elevates symmetrically   XI: Sternomastoid and trapezius are strong.   XII: Tongue midline without atrophy or fasciculations    Motor: Normal tone and bulk in the upper and lower extremities.  Minimal cogwheeling right arm    Power testing: Full power in all muscles tested    Reflexes: Upper extremities:        Lower extremities:        Toe signs:        Clonus:     Sensory: Light touch:        Pinprick:        Vibration:        Position:        Temperature:    Cerebellar: Finger-to-nose: Slow.  Very minimal postural tremor bilaterally.  No resting tremor.           Rapid movement: Normal           Heel-to-shin:    Gait and Station: Comes to stand without difficulty.  Reduced step length and armswing.  Multiple steps with turns.  No tremor activated with ambulation.  Slightly flexed forward posture at the head and neck.  No Romberg no drift.      Results:    Lab Results   Component Value Date    GLUCOSE 174 (H) 12/06/2024    BUN 13 12/06/2024    CREATININE 1.06 12/06/2024    EGFRIFNONA 83 09/14/2021    EGFRIFAFRI 96 09/14/2021    BCR 12 12/06/2024    CO2 27 12/06/2024    CALCIUM 10.1 12/06/2024    ALBUMIN 4.4 12/06/2024    AST 16 12/06/2024    ALT 15 12/06/2024     Lab Results   Component Value Date    WBC 7.31 02/29/2024    HGB 13.6 02/29/2024    HCT 41.2  02/29/2024    MCV 86.4 02/29/2024     02/29/2024     Lab Results   Component Value Date    TSH 0.961 02/29/2024     Lab Results   Component Value Date    HGBA1C 6.0 (H) 12/06/2024         Assessment:    1.  Parkinson's disease-patient reports improvement after adding nightly controlled release dosage and adjusting the times of his immediate release doses.  He denies any side effects specifically no nausea or dyskinesia.  He had minimal cogwheeling of the right arm today but it is almost 3:00 and patient states he has not taken his 2:00 dose yet.  Patient asked about confirming Parkinson's disease and amount of dopamine with a DaTscan.  Told patient we could either do a DaTscan or alpha-synuclein skin biopsy.  Discussed with patient I do not think either test is needed since he has improved on carbidopa/levodopa but patient would like to proceed with  skin biopsy.           Assessment and Plan   Diagnoses and all orders for this visit:    1. Parkinson's disease without dyskinesia or fluctuating manifestations (Primary)  -     carbidopa-levodopa CR (SINEMET CR)  MG per CR tablet; Take 1 tablet by mouth Every Night.  Dispense: 30 tablet; Refill: 5  -     carbidopa-levodopa (SINEMET)  MG per tablet; Take 1 tablet by mouth 3 (Three) Times a Day.  Dispense: 90 tablet; Refill: 5      Ideal targets for risk factor control would be Blood pressure < 130/80, B12 > 500, TSH in normal range and LDL < 70; HbA1c < 6.5 and smoking cessation if applicable. Call 911 for stroke symptoms.  Recommend 150 minutes of physical activity weekly.  Limit alcohol intake.  Continue carbidopa/levodopa 25/100 mg 1 tablet 3 times a day in addition to controlled release 50/200 mg 1 tablet before bed  Continue physical therapy and speech therapy exercises at home  Start benefit verification for alpha-synuclein skin biopsy  Follow-up in 6 months or sooner if needed      Avoid taking your carbidopa/levodopa with food (particularly  protein).  Take one hour before or 2 hours after meals.  - Side effects include nausea and dizziness upon standing.  - At higher doses, may cause excessive movements called dyskinesias, confusion, or hallucinations.     Check out the Parkinson's Foundation at parkinson.org, the [facility] [facility] at michaeljfox.[email or web address], or [email or web address].[email or web address]    Consider increasing exercise with yoga, dance, domenica chi, boxing, or music therapy.    Some [facility] gyms offer a free Parkinson's clinic.  Call your local [facility] to see if it is available.  Rock steady boxing is also a great boxing based fitness curriculum for Parkinson's disease.  [facility] offers [employer] boxing.   [employer] [facility] for speech.    [facility] offers a free Parkinson's exercise class.     Consider cognitive exercise as well such as puzzles, word search, Sudoku, etc.     For constipation, increase water intake, eat fruits and vegetables, whole grains, exercise, consider polythylene glycol (Miralax) or bisacodyl suppository, or abdominal massage.      For sleep and vivid dreams, consider melatonin 5-10 mg over-the-counter supplement.    ROCK STEADY BOXING     Hamzah Rangel  [address] [place], [place] [address] [phone], [phone]  [email or web address]     In-Person: Monday and Wednesday, 9:30 AM  [place] [address] [id number]     Zoom: Monday, Wednesday, Friday 9:30 AM  ID# [phone]  Website: [employer]  Facebook: [facility]  YouTube: [employer] [place]       Time: Spent 50 minutes in total encounter time. This included time for chart review, obtaining history, performing pertinent physical examination, updating medical information, ordering tests/referrals, discussion of diagnosis, medical management, counseling, and encounter documentation.        BEN Thorpe          Much of this encounter note was dictated utilizing Dragon dictation which is an electronic transcription/translation  of spoken language to printed text. The electronic translation of spoken language may permit erroneous, or at times, nonsensical words or phrases to be inadvertently transcribed; Although I have reviewed the note for such errors, some may still exist.    Portions of this assessment have been copied from previous documentation which has been thoroughly reviewed and updated as appropriate.

## 2025-05-05 ENCOUNTER — TELEPHONE (OUTPATIENT)
Dept: NEUROLOGY | Facility: CLINIC | Age: 80
End: 2025-05-05
Payer: MEDICARE

## 2025-05-05 NOTE — TELEPHONE ENCOUNTER
Received approval from [facility] for skin biopsy  Pt has zero dollar out of pocket cost  Called pt to schedule  No answer/lvm requesting a return call  Also sent [employer] message

## 2025-05-06 ENCOUNTER — PROCEDURE VISIT (OUTPATIENT)
Dept: NEUROLOGY | Facility: CLINIC | Age: 80
End: 2025-05-06
Payer: MEDICARE

## 2025-05-06 DIAGNOSIS — G20.A1 PARKINSON'S DISEASE WITHOUT DYSKINESIA OR FLUCTUATING MANIFESTATIONS: Primary | ICD-10-CM

## 2025-05-06 NOTE — PROGRESS NOTES
Skin Biopsy Procedure Note    PRE-OP DIAGNOSIS: Parkinson's disease  POST-OP DIAGNOSIS: Same     PROCEDURE: punch skin biopsy    Performing Provider: BEN Valenzuela    Reason for Biopsy/Brief Clinical History: Kerrie Tyler is a  79 y.o.  male who is here for alpha-synuclein skin biopsy.  Patient reported in May 2024 he had COVID and afterwards he feels that his steps have slowly been getting shorter and he has developed reduced voice projection and slow movements along with some resting tremor of the left hand.  Patient has noticed improvements after starting carbidopa/levodopa.    Follow-up in 6 to 8 weeks for results.      Biopsy site:      Posterior Cervical (Left)  3 cm lateral from C7 Vertebrae    Distal Thigh (Left)  10 cm above lateral knee    Distal Leg (Left)  10 cm above the lateral malleolus       After obtaining informed consent, the area was prepped and draped in the usual fashion.     Timeout was performed including correct patient, date of birth, allergies and biopsy locations.    Anesthesia was obtained with 2% lidocaine with epinephrine.     A full thickness punch biopsy was obtained at each site with a 3mm punch. Gauze and bandage were applied and hemostasis was assured. The patient tolerated the procedure well.    Wound care discussed and handout with home instructions given to patient.    3 Specimen(s) were placed in each vial of fixative and packaged appropriately to be sent for processing at [employer] [facility].      [employer] Fed-ex pick-up number  - [id number]      FedEx Tracking - [id number]    Kit Number- [id number]  Expiration Date- 01/27/2026

## 2025-05-06 NOTE — PATIENT INSTRUCTIONS
CARING FOR YOUR BIOPSY SITE    One or more of your biopsy sites may continue to bleed for the rest of the day.  If bleeding persists after 48 hours, you should continue to keep the biopsy site dry and refrain from soaking.  Each biopsy site may or may not form a scab in a few days.  While the biopsy sites heal, make sure to:    Always keep the biopsy areas clean and dry.  Do not soak in water for 48 hours: No swimming, no hot tubs, no baths.  Showers are permitted within the first 24 hours, but avoid direct, prolonged water contact with the biopsy sites.  Change the bandages whenever they become wet or damp.  We recommend that you apply Aquaphor or Vaseline to the Band-Aid.  Keep the wound covered with Aquaphor or Vaseline at all times for 2 weeks.  For each biopsy site, a bandage is no longer necessary if: A scab has formed or new skin begins to grow over the biopsy sites and the bleeding has stopped.      1 or more biopsy sites may form a small bump or a small indent, and could form a small scar.  But in most cases, after a few months pass, you will not be able to notice from where the biopsies were taken.      ADDITIONAL INFORMATION:    Your biopsy site should not be particularly painful.  You can apply ice to the area or take Tylenol for discomfort.  Do not take Motrin, ibuprofen, or aspirin.  A significant increase in pain may indicate a problem.  Call the office if this occurs.      WHEN TO CALL YOUR CLINICIAN WHO PERFORMED THE BIOPSY:    Redness around 1 or more of the biopsy sites that has spread significantly.  There is discharge coming from the biopsy site.  You have a fever of 100 °F or higher.  Swelling  If bleeding occurs which you cannot stop with firm pressure for 20 minutes      WHO TO CALL:    Call [facility] Neurology Office at [phone] for problems.  Ask for your physician and inform the staff if you have a problem with your biopsy site.

## 2025-05-21 ENCOUNTER — OFFICE VISIT (OUTPATIENT)
Age: 80
End: 2025-05-21
Payer: MEDICARE

## 2025-05-21 VITALS
DIASTOLIC BLOOD PRESSURE: 68 MMHG | BODY MASS INDEX: 24.27 KG/M2 | HEIGHT: 66 IN | OXYGEN SATURATION: 97 % | WEIGHT: 151 LBS | HEART RATE: 73 BPM | SYSTOLIC BLOOD PRESSURE: 124 MMHG

## 2025-05-21 DIAGNOSIS — E11.9 TYPE 2 DIABETES MELLITUS WITHOUT COMPLICATION, WITHOUT LONG-TERM CURRENT USE OF INSULIN: ICD-10-CM

## 2025-05-21 DIAGNOSIS — I10 ESSENTIAL HYPERTENSION: ICD-10-CM

## 2025-05-21 DIAGNOSIS — Z95.1 S/P CABG X 3: ICD-10-CM

## 2025-05-21 DIAGNOSIS — I25.708 CORONARY ARTERY DISEASE OF BYPASS GRAFT OF NATIVE HEART WITH STABLE ANGINA PECTORIS: Primary | ICD-10-CM

## 2025-05-21 NOTE — PROGRESS NOTES
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   He moved here from [place] a couple of years prior and has not followed with a cardiologist.  Echocardiogram demonstrated normal LV function along with distal anteroapical dyskinesis and septal dyskinesis.    Patient was noted to have a discrete 80% stenosis in the AJAY to RCA anastomosis and underwent PCI to that vessel by Dr. Kenji Gavin.  The LAD fills via patent LIMA.  The ramus was occluded and the circumflex coronary artery had no flow-limiting disease.  He was subsequently was discharged home on aspirin and Plavix.      He has been doing very well from a cardiac standpoint as of late.  He has been diagnosed with Parkinson's and is currently undergoing therapy for that.  He denies any chest pain or dyspnea.  His blood pressure and heart rate are well-controlled.      Review of Systems   Constitutional: Negative for fever and malaise/fatigue.   HENT:  Negative for nosebleeds and sore throat.    Eyes:  Negative for blurred vision and double vision.   Cardiovascular:  Negative for chest pain, claudication, palpitations and syncope.   Respiratory:  Negative for cough, shortness of breath and snoring.    Endocrine: Negative for cold intolerance, heat intolerance and polydipsia.   Skin:  Negative for itching, poor wound healing and rash.   Musculoskeletal:  Negative for joint pain, joint swelling, muscle weakness and myalgias.   Gastrointestinal:  Negative for abdominal pain, melena, nausea and vomiting.    Neurological:  Negative for light-headedness, loss of balance, seizures, vertigo and weakness.   Psychiatric/Behavioral:  Negative for altered mental status and depression.           Past Medical History:   Diagnosis Date    Allergic     Coronary artery disease 1994    [facility]    Diabetes mellitus 2012    Type2. Controlled    Difficulty walking 4 months    Hyperlipidemia 1994    Controlled    Kidney stone     Movement disorder 4 months    Myocardial infarction        The following portions of the patient's history were reviewed and updated as appropriate: Social history , Family history and Surgical history     Current Outpatient Medications on File Prior to Visit   Medication Sig Dispense Refill    aspirin 81 MG EC tablet Take 1 tablet by mouth Daily. 30 tablet 11    carbidopa-levodopa (SINEMET)  MG per tablet Take 1 tablet by mouth 3 (Three) Times a Day. 90 tablet 5    carbidopa-levodopa CR (SINEMET CR)  MG per CR tablet Take 1 tablet by mouth Every Night. 30 tablet 5    finasteride (PROSCAR) 5 MG tablet Take 1 tablet by mouth Daily.      glucose blood (Accu-Chek Guide) test strip 1 each by Other route 3 (Three) Times a Day. Use as instructed 270 each 3    glucose monitor monitoring kit 1 each Daily. 1 each 1    metFORMIN ER (GLUCOPHAGE-XR) 500 MG 24 hr tablet TAKE 1 TABLET BY MOUTH TWICE DAILY 180 tablet 3    metoprolol tartrate (LOPRESSOR) 25 MG tablet TAKE HALF A TABLET BY MOUTH TWICE DAILY 180 tablet 3    tamsulosin (FLOMAX) 0.4 MG capsule 24 hr capsule TAKE 1 CAPSULE BY MOUTH DAILY 90 capsule 3    trandolapril (MAVIK) 4 MG tablet TAKE 1 TABLET BY MOUTH TWICE DAILY 180 tablet 1    rosuvastatin (CRESTOR) 10 MG tablet TAKE 1 TABLET BY MOUTH DAILY (Patient not taking: Reported on 5/21/2025) 90 tablet 0     No current facility-administered medications on file prior to visit.       Allergies   Allergen Reactions    Sulfa Antibiotics Rash, Itching, Unknown - Low Severity and Other (See Comments)     "Trimethoprim Rash       Vitals:    05/21/25 0950   BP: 124/68   Pulse: 73   SpO2: 97%   Weight: 68.5 kg (151 lb)   Height: 167.6 cm (66\")     Body mass index is 24.37 kg/m².   Constitutional:       Appearance: Well-developed.   Eyes:      General: No scleral icterus.     Conjunctiva/sclera: Conjunctivae normal.   HENT:      Head: Normocephalic and atraumatic.   Neck:      Thyroid: No thyromegaly.      Vascular: Normal carotid pulses. No carotid bruit, hepatojugular reflux or JVD.      Trachea: No tracheal deviation.   Pulmonary:      Effort: No respiratory distress.      Breath sounds: Normal breath sounds. No decreased breath sounds. No wheezing. No rhonchi. No rales.   Chest:      Chest wall: Not tender to palpatation.   Cardiovascular:      Normal rate. Regular rhythm.      No gallop.    Pulses:     Carotid: 2+ bilaterally.     Radial: 2+ bilaterally.     Femoral: 2+ bilaterally.     Dorsalis pedis: 2+ bilaterally.     Posterior tibial: 2+ bilaterally.  Edema:     Peripheral edema absent.   Abdominal:      General: Bowel sounds are normal. There is no distension.      Palpations: Abdomen is soft.      Tenderness: There is no abdominal tenderness.   Musculoskeletal:         General: No deformity.      Cervical back: Normal range of motion and neck supple. Skin:     Findings: No erythema or rash.      Comments: Sternotomy   Neurological:      Mental Status: Alert and oriented to person, place, and time.      Sensory: No sensory deficit.   Psychiatric:         Behavior: Behavior normal.            Lab Results   Component Value Date    WBC 7.31 02/29/2024    HGB 13.6 02/29/2024    HCT 41.2 02/29/2024    MCV 86.4 02/29/2024     02/29/2024       Lab Results   Component Value Date    GLUCOSE 174 (H) 12/06/2024    BUN 13 12/06/2024    CREATININE 1.06 12/06/2024    EGFRIFNONA 83 09/14/2021    EGFRIFAFRI 96 09/14/2021    BCR 12 12/06/2024    K 4.1 12/06/2024    CO2 27 12/06/2024    CALCIUM 10.1 12/06/2024    ALBUMIN " 4.4 12/06/2024    AST 16 12/06/2024    ALT 15 12/06/2024       Lab Results   Component Value Date    GLUCOSE 174 (H) 12/06/2024    CALCIUM 10.1 12/06/2024     12/06/2024    K 4.1 12/06/2024    CO2 27 12/06/2024    CL 99 12/06/2024    BUN 13 12/06/2024    CREATININE 1.06 12/06/2024    EGFRIFAFRI 96 09/14/2021    EGFRIFNONA 83 09/14/2021    BCR 12 12/06/2024    ANIONGAP 8.5 02/29/2024       Lab Results   Component Value Date    CHOL 115 08/25/2022    CHLPL 175 12/06/2024    TRIG 75 12/06/2024    HDL 54 12/06/2024     (H) 12/06/2024         ECG 12 Lead    Date/Time: 5/21/2025 10:27 AM  Performed by: Tony Littlejohn MD    Authorized by: Tony Littlejohn MD  Rhythm: sinus rhythm  Rate: normal  QRS axis: left  Other findings: non-specific ST-T wave changes    Clinical impression: non-specific ECG             8/24/22  CORONARY ANGIOGRAPHY:  Left main: 60% distal left main 60%  Left anterior descending: The percent occluded at the origin  Ramus intermedius: 70 to 80% origin and then 80% mid where it looks like a graft had tied into but now is occluded  Circumflex: Large vessel but it mainly is in the AV groove there are 2 small OM's that are free of disease  RCA: Is a dominant vessel.  100% occluded in the midportion     LIMA to LAD: widely patent and normal.  Widely patent and looks good the distal LAD is also good     AJAY to RCA the graft is widely patent but at the insertion site is a 70 to 80% lesion the distal RCA looks good.    Patient underwent 3 drug-eluting stents placed to Dr. Kenji Gavin      Results for orders placed during the hospital encounter of 08/23/22    Adult Transthoracic Echo Complete W/ Cont if Necessary Per Protocol    Interpretation Summary  · The study is technically difficult for diagnosis.  · Estimated right ventricular systolic pressure from tricuspid regurgitation is normal (<35 mmHg). Calculated right ventricular systolic pressure from tricuspid regurgitation is 21.9  mmHg.  · Estimated left ventricular EF = 56% Left ventricular systolic function is normal.  · Left ventricular diastolic function was normal.  · The following left ventricular wall segments are hypokinetic: apical septal, basal inferoseptal, mid inferoseptal, mid anteroseptal and basal inferoseptal.      DISCUSSION/SUMMARY  80 year-old male with a medical history of coronary artery disease with prior CABG including a LIMA to LAD and AJAY to RCA, preserved ejection fraction, hyperlipidemia, hypertension and diabetes mellitus who presented with chest discomfort consistent with unstable angina in August 2022.  He underwent PCI to the mid RCA anastomosis distally with 3 overlapping drug-eluting stents.  He tolerated this well and was subsequently discharged on dual antiplatelet therapy.  He is currently on Plavix monotherapy and doing well from a cardiac standpoint.  He denies any chest pain.    1.  Coronary artery disease with prior CABG and PCI of the anastomosis of the LIMA to LAD in August 2022.  No angina at this time  -Continue aspirin lifelong   -Plan on continuing Crestor therapy.  20 mg p.o. nightly.  Last LDL was at goal.  -Continue metoprolol tartrate 12.5 mg p.o. every 12 hours.     2.  Hyperlipidemia: Continue Crestor 10 mg p.o. nightly.  Tolerating well.  Patient has mild myalgias that are tolerable.  No elevation in transaminases

## 2025-06-04 ENCOUNTER — TELEPHONE (OUTPATIENT)
Dept: NEUROLOGY | Facility: CLINIC | Age: 80
End: 2025-06-04
Payer: MEDICARE

## 2025-06-04 NOTE — TELEPHONE ENCOUNTER
Received skin biopsy results back from [facility]  Biopsy results support a diagnosis of Parkinsons per Dr Salinas  Called pt to discuss results  NO answer  VM not set up  Results scanned under media

## 2025-06-09 DIAGNOSIS — I25.708 CORONARY ARTERY DISEASE OF BYPASS GRAFT OF NATIVE HEART WITH STABLE ANGINA PECTORIS: ICD-10-CM

## 2025-06-09 DIAGNOSIS — I10 ESSENTIAL HYPERTENSION: ICD-10-CM

## 2025-06-11 RX ORDER — METOPROLOL TARTRATE 25 MG/1
TABLET, FILM COATED ORAL
Qty: 90 TABLET | Refills: 3 | Status: SHIPPED | OUTPATIENT
Start: 2025-06-11

## 2025-06-30 NOTE — PROGRESS NOTES
NAME: Kerrie Tyler   : 1945    Chief Complaint   Patient presents with    Parkinsons disease without dyskinesia or fluctuating manife        HPI:  Kerrie Tyler is a 80 y.o. right-handed male who I am seeing in follow-up regarding Parkinson's disease.  He is accompanied by his wife who adds to the history.  I last saw him on 2025, with the following history taken from that note with additions/modifications as indicated:    The patient states that in May of last year he had COVID and then afterwards he feels that his steps have slowly been getting shorter and that he has developed reduced voice projection and slowed movements along with some resting tremor of the left hand. The patient denies any family history of a similar finding. He is a retired ENT.      Patient states that his memory is sharp and they denies any visual hallucinations. He denies numbness or tingling in the hands or feet. He has history of a fall 2023 landing on his face without loss of consciousness. He states that a head CT was obtained which was negative for a bleed. About 2 months later he fell again this time it was at home at night with fracture of the right tibia.        Patient states he is doing better after starting carbidopa/levodopa 1 tablet 3 times a day.  He takes his meds at 710 without any side effects.  He rarely notices a left hand tremor that is worse with stress. He states that his shuffling gait is better. He denies any freezing or festination's. He states he is now able to stand up the first time when before it took him 3 or 4 tries. He states physical therapy and speech therapy has also helped.     History interim    Patient here today to follow-up on skin biopsy results which was positive for phosphorylated alpha-synuclein in all 3 biopsy sites.  Patient denies any change.  He continues to take Carbidopa/levodopa 25/100 mg 1 tablet 3 times a day and Carbidopa/levodopa CR 50/200 mg 1 tablet before bed  without any side effects.  He states that his balance is the same and he is very cautious.  He denies any recent falls.  He denies much tremor.  He denies any shuffling, freezing or festination's.  He continues to ride a stationary bike in the mornings and walk in the evenings.  He denies much trouble getting out of bed in the mornings after starting the controlled release nightly dose.  He denies any drooling or trouble swallowing.  He completed speech therapy and feels like he is projecting his voice better.  He denies any vivid dreams or hallucinations.      Past Medical History:   Diagnosis Date    Allergic     Coronary artery disease 1994    CABAG    Diabetes mellitus 2012    Type2. Controlled    Difficulty walking 4 months    Hyperlipidemia 1994    Controlled    Kidney stone     Movement disorder 4 months    Myocardial infarction          Past Surgical History:   Procedure Laterality Date    CARDIAC CATHETERIZATION N/A 08/24/2022    Procedure: Left Heart Cath  FEMORAL ACCESS;  Surgeon: Jam Gavin MD;  Location: [facility] INVASIVE LOCATION;  Service: Cardiovascular;  Laterality: N/A;  Use femoral access- patient has bilateral CAMERON's with previous CABG    CARDIAC CATHETERIZATION N/A 08/24/2022    Procedure: Left ventriculography;  Surgeon: Jam Gavin MD;  Location: [facility] CATH INVASIVE LOCATION;  Service: Cardiovascular;  Laterality: N/A;    CARDIAC CATHETERIZATION N/A 08/24/2022    Procedure: Native mammary injection;  Surgeon: Jam Gavin MD;  Location: [facility] CATH INVASIVE LOCATION;  Service: Cardiovascular;  Laterality: N/A;    CARDIAC CATHETERIZATION  08/24/2022    Procedure: RESTING FULL CYCLE RATIO;  Surgeon: Jam Gavin MD;  Location: [facility] INVASIVE LOCATION;  Service: Cardiovascular;;    CARDIAC CATHETERIZATION N/A 08/24/2022    Procedure: Stent LIZBET bypass graft;  Surgeon: Jam Gavin MD;  Location: [facility] CATH INVASIVE LOCATION;  Service: Cardiovascular;  Laterality:  N/A;    CARDIAC CATHETERIZATION N/A 08/24/2022    Procedure: Coronary angiography;  Surgeon: Jam Gavin MD;  Location:  [facility] CATH INVASIVE LOCATION;  Service: Cardiovascular;  Laterality: N/A;    CARDIAC CATHETERIZATION N/A 08/24/2022    Procedure: Percutaneous Coronary Intervention;  Surgeon: Jam Gavin MD;  Location:  [facility] CATH INVASIVE LOCATION;  Service: Cardiovascular;  Laterality: N/A;    CARDIAC SURGERY  1994    CATARACT EXTRACTION, BILATERAL Bilateral 1995    CORONARY ARTERY BYPASS GRAFT  1994    3 vessel    CORONARY STENT PLACEMENT  August 2022    EXTRACORPOREAL SHOCK WAVE LITHOTRIPSY (ESWL) Bilateral 2004    EYE SURGERY  1995    TONSILLECTOMY             Current Outpatient Medications:     aspirin 81 MG EC tablet, Take 1 tablet by mouth Daily., Disp: 30 tablet, Rfl: 11    carbidopa-levodopa (SINEMET)  MG per tablet, Take 1 tablet by mouth 3 (Three) Times a Day., Disp: 90 tablet, Rfl: 5    carbidopa-levodopa CR (SINEMET CR)  MG per CR tablet, Take 1 tablet by mouth Every Night., Disp: 30 tablet, Rfl: 5    finasteride (PROSCAR) 5 MG tablet, Take 1 tablet by mouth Daily., Disp: , Rfl:     glucose blood (Accu-Chek Guide) test strip, 1 each by Other route 3 (Three) Times a Day. Use as instructed, Disp: 270 each, Rfl: 3    glucose monitor monitoring kit, 1 each Daily., Disp: 1 each, Rfl: 1    metFORMIN ER (GLUCOPHAGE-XR) 500 MG 24 hr tablet, TAKE 1 TABLET BY MOUTH TWICE DAILY, Disp: 180 tablet, Rfl: 3    metoprolol tartrate (LOPRESSOR) 25 MG tablet, TAKE HALF A TABLET BY MOUTH TWICE DAILY, Disp: 90 tablet, Rfl: 3    rosuvastatin (CRESTOR) 10 MG tablet, TAKE 1 TABLET BY MOUTH DAILY, Disp: 90 tablet, Rfl: 0    tamsulosin (FLOMAX) 0.4 MG capsule 24 hr capsule, TAKE 1 CAPSULE BY MOUTH DAILY, Disp: 90 capsule, Rfl: 3    trandolapril (MAVIK) 4 MG tablet, TAKE 1 TABLET BY MOUTH TWICE DAILY, Disp: 180 tablet, Rfl: 1      Family History   Problem Relation Age of Onset    Heart attack Mother               Diabetes Father     Heart disease Father         [id number]    Hypertension Father     Hyperlipidemia Father     Hypertension Sister     Hyperlipidemia Sister     Heart disease Sister         CABAG     Diabetes Brother     Heart disease Brother         Also Bypass     Hypertension Brother     Hyperlipidemia Brother     Prostate cancer Neg Hx          Social History     Socioeconomic History    Marital status:    Tobacco Use    Smoking status: Former     Current packs/day: 0.00     Average packs/day: 0.5 packs/day for 12.0 years (6.0 ttl pk-yrs)     Types: Cigarettes     Start date: 1982     Quit date: 1994     Years since quittin.5     Passive exposure: Never    Smokeless tobacco: Never   Vaping Use    Vaping status: Never Used   Substance and Sexual Activity    Alcohol use: Never    Drug use: Never    Sexual activity: Not Currently     Partners: Female         Allergies   Allergen Reactions    Sulfa Antibiotics Rash, Itching, Unknown - Low Severity and Other (See Comments)    Trimethoprim Rash         Pain Scale: 0/10        ROS:  Review of Systems   HENT:  Negative for drooling and trouble swallowing.    Musculoskeletal:  Positive for gait problem.   Neurological:  Positive for tremors. Negative for dizziness and weakness.   Psychiatric/Behavioral:  Negative for hallucinations and sleep disturbance.        Physical Exam:  Vitals:    25 1417   BP: 128/58   Pulse: 65   SpO2: 97%   Weight: 68.5 kg (151 lb)       Orthostatic BP:       Physical Exam  General: Well-developed male no acute distress  HEENT: Normocephalic no evidence of trauma.  Hypomimia and reduced lid blink  Neck: Supple.    Heart:   Extremities:      Neurological Exam:   Mental Status: Awake, alert, oriented to person, place and time.  Conversant without evidence of an affective disorder, thought disorder, delusions or hallucinations.  Attention span and concentration are normal.  HCF: No aphasia, apraxia  or dysarthria.  Recent and remote memory intact.  Knowledge of recent events intact.  CN: I:   II: Visual fields full without left inattention   III, IV, VI: Eye movements intact without nystagmus or ptosis.  Pupils equal round and reactive to light.   V,VII: Light touch and pinprick intact all 3 divisions of V.  Facial muscles symmetrical.   VIII: Hearing intact to finger rub   IX,X: Soft palate elevates symmetrically   XI: Sternomastoid and trapezius are strong.   XII: Tongue midline without atrophy or fasciculations    Motor: Normal bulk in the upper and lower extremities.  Minimal cogwheeling right arm    Power testing: Full power in all muscles tested    Reflexes: Upper extremities:        Lower extremities:        Toe signs:        Clonus:     Sensory: Light touch:        Pinprick:        Vibration:        Position:        Temperature:    Cerebellar: Finger-to-nose: Slow.  No resting tremor.  Very slight postural tremor bilaterally           Rapid movement: Normal           Heel-to-shin:    Gait and Station: Comes to stand without difficulty.  Slightly flexed forward posture at the head and neck.  Slightly reduced step length and armswing.  Multiple steps with turn.  No tremor activated with ambulation.  No drift.      Results:    Lab Results   Component Value Date    GLUCOSE 174 (H) 12/06/2024    BUN 13 12/06/2024    CREATININE 1.06 12/06/2024    EGFRIFNONA 83 09/14/2021    EGFRIFAFRI 96 09/14/2021    BCR 12 12/06/2024    CO2 27 12/06/2024    CALCIUM 10.1 12/06/2024    ALBUMIN 4.4 12/06/2024    AST 16 12/06/2024    ALT 15 12/06/2024     Lab Results   Component Value Date    WBC 7.31 02/29/2024    HGB 13.6 02/29/2024    HCT 41.2 02/29/2024    MCV 86.4 02/29/2024     02/29/2024     Lab Results   Component Value Date    TSH 0.961 02/29/2024     Lab Results   Component Value Date    HGBA1C 6.0 (H) 12/06/2024       Assessment:    1.  Parkinson's disease-skin biopsy demonstrated phosphorylated  alpha-synuclein in all 3 sites biopsy.  Patient states he is doing well on current regimen of carbidopa/levodopa IR and CR.  He denies any side effects.           Assessment and Plan   Diagnoses and all orders for this visit:    1. Parkinson's disease without dyskinesia or fluctuating manifestations (Primary)        Ideal targets for risk factor control would be Blood pressure < 130/80, B12 > 500, TSH in normal range and LDL < 70; HbA1c < 6.5 and smoking cessation if applicable. Call 911 for stroke symptoms.  Recommend 150 minutes of physical activity weekly.  Limit alcohol intake.  Continue carbidopa/levodopa 25/100 mg 1 tablet 3 times a day and CR 50/200 mg 1 tablet at 7 PM  Follow-up in 3 months or sooner if needed    Avoid taking your carbidopa/levodopa with food (particularly protein).  Take one hour before or 2 hours after meals.  - Side effects include nausea and dizziness upon standing.  - At higher doses, may cause excessive movements called dyskinesias, confusion, or hallucinations.     Check out the Parkinson's Foundation at parkinson.org, the [employer] at [place]: online travellers club.org, or Apdaparkinson.[email or web address]    Consider increasing exercise with yoga, dance, domenica chi, boxing, or music therapy.    Some [facility] gyms offer a free Parkinson's clinic.  Call your local [facility] to see if it is available.  Rock steady boxing is also a great boxing based fitness curriculum for Parkinson's disease.  [facility] offers [employer] boxing.   [employer] for speech.    [employer] offers a free Parkinson's exercise class.     Consider cognitive exercise as well such as puzzles, word search, Sudoku, etc.     For constipation, increase water intake, eat fruits and vegetables, whole grains, exercise, consider polythylene glycol (Miralax) or bisacodyl suppository, or abdominal massage.      For sleep and vivid dreams, consider melatonin 5-10 mg over-the-counter supplement.    ROCK STEADY BOXING     Hamzah  Juan Carlos  [address] [place] [address]  [phone], [phone]  [email or web address]     In-Person: Monday and Wednesday, 9:30 AM  [facility]  [facility]  [address] [id number]     Zoom: Monday, Wednesday, Friday 9:30 AM  ID# [phone]  Website: [employer]  Facebook: [employer] Parkinson's  YouTube: [employer]ester     Time: Spent 30 minutes in total encounter time. This included time for chart review, obtaining history, performing pertinent physical examination, updating medical information, ordering tests/referrals, discussion of diagnosis, medical management, counseling, and encounter documentation.        BEN Thorpe          Much of this encounter note was dictated utilizing Dragon dictation which is an electronic transcription/translation of spoken language to printed text. The electronic translation of spoken language may permit erroneous, or at times, nonsensical words or phrases to be inadvertently transcribed; Although I have reviewed the note for such errors, some may still exist.    Portions of this assessment have been copied from previous documentation which has been thoroughly reviewed and updated as appropriate.

## 2025-07-01 ENCOUNTER — OFFICE VISIT (OUTPATIENT)
Dept: NEUROLOGY | Facility: CLINIC | Age: 80
End: 2025-07-01
Payer: MEDICARE

## 2025-07-01 VITALS
WEIGHT: 151 LBS | OXYGEN SATURATION: 97 % | HEART RATE: 65 BPM | SYSTOLIC BLOOD PRESSURE: 128 MMHG | DIASTOLIC BLOOD PRESSURE: 58 MMHG | BODY MASS INDEX: 24.37 KG/M2

## 2025-07-01 DIAGNOSIS — G20.A1 PARKINSON'S DISEASE WITHOUT DYSKINESIA OR FLUCTUATING MANIFESTATIONS: Primary | ICD-10-CM

## 2025-07-28 RX ORDER — ROSUVASTATIN CALCIUM 10 MG/1
10 TABLET, COATED ORAL DAILY
Qty: 90 TABLET | Refills: 0 | Status: SHIPPED | OUTPATIENT
Start: 2025-07-28

## 2025-08-01 DIAGNOSIS — I10 ESSENTIAL HYPERTENSION: ICD-10-CM

## 2025-08-01 RX ORDER — TRANDOLAPRIL TABLETS 4 MG/1
4 TABLET ORAL 2 TIMES DAILY
Qty: 180 TABLET | Refills: 1 | Status: SHIPPED | OUTPATIENT
Start: 2025-08-01

## 2025-08-27 DIAGNOSIS — E11.9 TYPE 2 DIABETES MELLITUS WITHOUT COMPLICATION, WITHOUT LONG-TERM CURRENT USE OF INSULIN: ICD-10-CM

## 2025-08-27 RX ORDER — BLOOD SUGAR DIAGNOSTIC
1 STRIP MISCELLANEOUS 3 TIMES DAILY
Qty: 270 EACH | Refills: 3 | Status: SHIPPED | OUTPATIENT
Start: 2025-08-27

## (undated) DEVICE — CATH DIAG IMPULSE FL4 5F 100CM

## (undated) DEVICE — 6F .070 IM 90CM: Brand: VISTA BRITE TIP

## (undated) DEVICE — GW PRESSUREWIRE AERIS W/ AGILE TP 175CM

## (undated) DEVICE — GW EMR FIX EXCHG J STD .035 3MM 260CM

## (undated) DEVICE — CATH DIAG IMPULSE FR4 5F 100CM

## (undated) DEVICE — PK CATH CARD 40

## (undated) DEVICE — CATH VENT MIV RADL PIG ST TIP 5F 110CM

## (undated) DEVICE — RADIFOCUS GLIDEWIRE: Brand: GLIDEWIRE

## (undated) DEVICE — Device

## (undated) DEVICE — ANGIO-SEAL VIP VASCULAR CLOSURE DEVICE: Brand: ANGIO-SEAL

## (undated) DEVICE — DEV INDEFLATOR P/N 580289

## (undated) DEVICE — INTRO SHEATH ART/FEM ENGAGE .035 6F12CM

## (undated) DEVICE — CATH DIAG IMPULSE IMT 5F 100CM

## (undated) DEVICE — KT MANIFLD CARDIAC

## (undated) DEVICE — GW HITORQUE/BAL MID/WT J W/HCOAT .014 3X190CM